# Patient Record
Sex: MALE | Race: BLACK OR AFRICAN AMERICAN | NOT HISPANIC OR LATINO | Employment: OTHER | URBAN - NONMETROPOLITAN AREA
[De-identification: names, ages, dates, MRNs, and addresses within clinical notes are randomized per-mention and may not be internally consistent; named-entity substitution may affect disease eponyms.]

---

## 2017-03-23 ENCOUNTER — HOSPITAL ENCOUNTER (INPATIENT)
Facility: HOSPITAL | Age: 52
LOS: 3 days | Discharge: HOME | End: 2017-03-26
Attending: FAMILY MEDICINE | Admitting: FAMILY MEDICINE

## 2017-03-23 PROBLEM — J96.90 RESPIRATORY FAILURE (HCC): Status: ACTIVE | Noted: 2017-03-23

## 2017-03-23 LAB
ARTERIAL PATENCY WRIST A: ABNORMAL
ATMOSPHERIC PRESS: ABNORMAL MMHG
BASE EXCESS BLDA CALC-SCNC: -1.8 MMOL/L (ref -2.4–2.4)
BDY SITE: ABNORMAL
CA-I BLD-MCNC: 4.5 MG/DL (ref 4.5–4.9)
CO2 BLDA-SCNC: 22.8 MMOL/L (ref 23–27)
GLUCOSE BLDA-MCNC: 369 MMOL/L
GLUCOSE BLDC GLUCOMTR-MCNC: NORMAL MG/DL (ref 70–130)
HCO3 BLDA-SCNC: 21.7 MMOL/L (ref 22–26)
HCT VFR BLD CALC: 39 % (ref 40–54)
HGB BLDA-MCNC: 13.2 G/DL (ref 14–18)
MODALITY: ABNORMAL
PCO2 BLDA: 33.2 MM HG (ref 35–45)
PH BLDA: 7.43 PH UNITS (ref 7.35–7.45)
PO2 BLDA: 74.1 MM HG (ref 80–105)
POTASSIUM BLDA-SCNC: 4.39 MMOL/L (ref 3.6–4.9)
SAO2 % BLDCOA: 94.4 %
SODIUM BLDA-SCNC: 134.8 MMOL/L (ref 138–146)

## 2017-03-23 PROCEDURE — 94799 UNLISTED PULMONARY SVC/PX: CPT

## 2017-03-23 PROCEDURE — 82962 GLUCOSE BLOOD TEST: CPT

## 2017-03-23 PROCEDURE — 25010000002 METHYLPREDNISOLONE PER 40 MG: Performed by: INTERNAL MEDICINE

## 2017-03-23 PROCEDURE — 82803 BLOOD GASES ANY COMBINATION: CPT | Performed by: INTERNAL MEDICINE

## 2017-03-23 PROCEDURE — 63710000001 INSULIN ASPART PER 5 UNITS: Performed by: INTERNAL MEDICINE

## 2017-03-23 PROCEDURE — 94640 AIRWAY INHALATION TREATMENT: CPT

## 2017-03-23 PROCEDURE — 94760 N-INVAS EAR/PLS OXIMETRY 1: CPT

## 2017-03-23 RX ORDER — NICOTINE POLACRILEX 4 MG
15 LOZENGE BUCCAL
Status: DISCONTINUED | OUTPATIENT
Start: 2017-03-23 | End: 2017-03-24 | Stop reason: SDUPTHER

## 2017-03-23 RX ORDER — CARVEDILOL PHOSPHATE 40 MG/1
40 CAPSULE, EXTENDED RELEASE ORAL DAILY
COMMUNITY
End: 2017-03-26 | Stop reason: HOSPADM

## 2017-03-23 RX ORDER — ONDANSETRON 4 MG/1
4 TABLET, FILM COATED ORAL EVERY 6 HOURS PRN
Status: DISCONTINUED | OUTPATIENT
Start: 2017-03-23 | End: 2017-03-26 | Stop reason: HOSPADM

## 2017-03-23 RX ORDER — CARVEDILOL 25 MG/1
25 TABLET ORAL 2 TIMES DAILY WITH MEALS
COMMUNITY
End: 2019-04-15 | Stop reason: ALTCHOICE

## 2017-03-23 RX ORDER — FUROSEMIDE 40 MG/1
40 TABLET ORAL 2 TIMES DAILY
COMMUNITY
End: 2018-11-21 | Stop reason: HOSPADM

## 2017-03-23 RX ORDER — SODIUM CHLORIDE 0.9 % (FLUSH) 0.9 %
1-10 SYRINGE (ML) INJECTION AS NEEDED
Status: DISCONTINUED | OUTPATIENT
Start: 2017-03-23 | End: 2017-03-26 | Stop reason: HOSPADM

## 2017-03-23 RX ORDER — HYDRALAZINE HYDROCHLORIDE 20 MG/ML
10 INJECTION INTRAMUSCULAR; INTRAVENOUS EVERY 6 HOURS PRN
Status: DISCONTINUED | OUTPATIENT
Start: 2017-03-23 | End: 2017-03-26 | Stop reason: HOSPADM

## 2017-03-23 RX ORDER — FUROSEMIDE 10 MG/ML
40 INJECTION INTRAMUSCULAR; INTRAVENOUS 2 TIMES DAILY
Status: DISCONTINUED | OUTPATIENT
Start: 2017-03-24 | End: 2017-03-26 | Stop reason: HOSPADM

## 2017-03-23 RX ORDER — BENAZEPRIL HYDROCHLORIDE 20 MG/1
40 TABLET ORAL DAILY
COMMUNITY
End: 2018-11-21 | Stop reason: HOSPADM

## 2017-03-23 RX ORDER — CARVEDILOL 25 MG/1
25 TABLET ORAL 2 TIMES DAILY WITH MEALS
Status: DISCONTINUED | OUTPATIENT
Start: 2017-03-23 | End: 2017-03-26 | Stop reason: HOSPADM

## 2017-03-23 RX ORDER — ACETAMINOPHEN 325 MG/1
650 TABLET ORAL EVERY 4 HOURS PRN
Status: DISCONTINUED | OUTPATIENT
Start: 2017-03-23 | End: 2017-03-26 | Stop reason: HOSPADM

## 2017-03-23 RX ORDER — ISOSORBIDE MONONITRATE 60 MG/1
60 TABLET, EXTENDED RELEASE ORAL
Status: DISCONTINUED | OUTPATIENT
Start: 2017-03-24 | End: 2017-03-26 | Stop reason: HOSPADM

## 2017-03-23 RX ORDER — DEXTROSE MONOHYDRATE 25 G/50ML
25 INJECTION, SOLUTION INTRAVENOUS
Status: DISCONTINUED | OUTPATIENT
Start: 2017-03-23 | End: 2017-03-26 | Stop reason: HOSPADM

## 2017-03-23 RX ORDER — HYDROCHLOROTHIAZIDE 25 MG/1
25 TABLET ORAL DAILY
COMMUNITY
End: 2017-03-26 | Stop reason: HOSPADM

## 2017-03-23 RX ORDER — AMLODIPINE BESYLATE 10 MG/1
10 TABLET ORAL DAILY
COMMUNITY
End: 2018-11-21 | Stop reason: HOSPADM

## 2017-03-23 RX ORDER — ALBUTEROL SULFATE 2.5 MG/3ML
2.5 SOLUTION RESPIRATORY (INHALATION) EVERY 4 HOURS PRN
Status: DISCONTINUED | OUTPATIENT
Start: 2017-03-23 | End: 2017-03-26 | Stop reason: HOSPADM

## 2017-03-23 RX ORDER — METHYLPREDNISOLONE SODIUM SUCCINATE 40 MG/ML
40 INJECTION, POWDER, LYOPHILIZED, FOR SOLUTION INTRAMUSCULAR; INTRAVENOUS EVERY 6 HOURS
Status: DISCONTINUED | OUTPATIENT
Start: 2017-03-23 | End: 2017-03-26 | Stop reason: HOSPADM

## 2017-03-23 RX ORDER — GLIPIZIDE 10 MG/1
10 TABLET ORAL
COMMUNITY
End: 2017-03-26 | Stop reason: HOSPADM

## 2017-03-23 RX ADMIN — CARVEDILOL 25 MG: 25 TABLET, FILM COATED ORAL at 22:15

## 2017-03-23 RX ADMIN — METHYLPREDNISOLONE SODIUM SUCCINATE 40 MG: 40 INJECTION, POWDER, FOR SOLUTION INTRAMUSCULAR; INTRAVENOUS at 22:15

## 2017-03-23 RX ADMIN — INSULIN ASPART 6 UNITS: 100 INJECTION, SOLUTION INTRAVENOUS; SUBCUTANEOUS at 22:15

## 2017-03-23 RX ADMIN — ACETAMINOPHEN 650 MG: 325 TABLET ORAL at 23:34

## 2017-03-23 RX ADMIN — ALBUTEROL SULFATE 2.5 MG: 2.5 SOLUTION RESPIRATORY (INHALATION) at 22:08

## 2017-03-23 RX ADMIN — Medication 10 ML: at 22:15

## 2017-03-24 ENCOUNTER — APPOINTMENT (OUTPATIENT)
Dept: CARDIOLOGY | Facility: HOSPITAL | Age: 52
End: 2017-03-24
Attending: INTERNAL MEDICINE

## 2017-03-24 LAB
ALBUMIN SERPL-MCNC: 3.2 G/DL (ref 3.4–4.8)
ALBUMIN/GLOB SERPL: 1.1 G/DL (ref 1.1–1.8)
ALP SERPL-CCNC: 58 U/L (ref 38–126)
ALT SERPL W P-5'-P-CCNC: 23 U/L (ref 21–72)
ANION GAP SERPL CALCULATED.3IONS-SCNC: 11 MMOL/L (ref 5–15)
ARTICHOKE IGE QN: 127 MG/DL (ref 1–129)
AST SERPL-CCNC: 61 U/L (ref 17–59)
BASOPHILS # BLD AUTO: 0.01 10*3/MM3 (ref 0–0.2)
BASOPHILS NFR BLD AUTO: 0.1 % (ref 0–2)
BH CV ECHO MEAS - ACS: 2.5 CM
BH CV ECHO MEAS - AO MAX PG (FULL): 1.6 MMHG
BH CV ECHO MEAS - AO MAX PG: 3.6 MMHG
BH CV ECHO MEAS - AO MEAN PG (FULL): 0.99 MMHG
BH CV ECHO MEAS - AO MEAN PG: 2 MMHG
BH CV ECHO MEAS - AO ROOT AREA: 11 CM^2
BH CV ECHO MEAS - AO ROOT DIAM: 3.7 CM
BH CV ECHO MEAS - AO V2 MAX: 95.3 CM/SEC
BH CV ECHO MEAS - AO V2 MEAN: 65.7 CM/SEC
BH CV ECHO MEAS - AO V2 VTI: 16.4 CM
BH CV ECHO MEAS - AVA(I,A): 3.1 CM^2
BH CV ECHO MEAS - AVA(I,D): 3.1 CM^2
BH CV ECHO MEAS - AVA(V,A): 3.4 CM^2
BH CV ECHO MEAS - AVA(V,D): 3.4 CM^2
BH CV ECHO MEAS - CONTRAST EF 4CH: 14.7 ML/M^2
BH CV ECHO MEAS - EDV(CUBED): 343.1 ML
BH CV ECHO MEAS - EDV(MOD-SP4): 224 ML
BH CV ECHO MEAS - EDV(TEICH): 255.5 ML
BH CV ECHO MEAS - EF(CUBED): 19.3 %
BH CV ECHO MEAS - EF(MOD-SP4): 14.7 %
BH CV ECHO MEAS - EF(TEICH): 15 %
BH CV ECHO MEAS - ESV(CUBED): 276.8 ML
BH CV ECHO MEAS - ESV(MOD-SP4): 191 ML
BH CV ECHO MEAS - ESV(TEICH): 217.3 ML
BH CV ECHO MEAS - FS: 6.9 %
BH CV ECHO MEAS - IVS/LVPW: 1.2
BH CV ECHO MEAS - IVSD: 1.3 CM
BH CV ECHO MEAS - LA DIMENSION: 5.4 CM
BH CV ECHO MEAS - LA/AO: 1.4
BH CV ECHO MEAS - LV MASS(C)D: 419.1 GRAMS
BH CV ECHO MEAS - LV MAX PG: 2 MMHG
BH CV ECHO MEAS - LV MEAN PG: 1 MMHG
BH CV ECHO MEAS - LV V1 MAX: 70.6 CM/SEC
BH CV ECHO MEAS - LV V1 MEAN: 44.9 CM/SEC
BH CV ECHO MEAS - LV V1 VTI: 11.1 CM
BH CV ECHO MEAS - LVIDD: 7 CM
BH CV ECHO MEAS - LVIDS: 6.5 CM
BH CV ECHO MEAS - LVLD AP4: 10.3 CM
BH CV ECHO MEAS - LVLS AP4: 9.9 CM
BH CV ECHO MEAS - LVOT AREA (M): 4.5 CM^2
BH CV ECHO MEAS - LVOT AREA: 4.6 CM^2
BH CV ECHO MEAS - LVOT DIAM: 2.4 CM
BH CV ECHO MEAS - LVPWD: 1.1 CM
BH CV ECHO MEAS - MR MAX PG: 63.8 MMHG
BH CV ECHO MEAS - MR MAX VEL: 399.3 CM/SEC
BH CV ECHO MEAS - MV A MAX VEL: 36.4 CM/SEC
BH CV ECHO MEAS - MV DEC SLOPE: 646.1 CM/SEC^2
BH CV ECHO MEAS - MV E MAX VEL: 87.5 CM/SEC
BH CV ECHO MEAS - MV E/A: 2.4
BH CV ECHO MEAS - MV MAX PG: 4.1 MMHG
BH CV ECHO MEAS - MV MEAN PG: 1.9 MMHG
BH CV ECHO MEAS - MV P1/2T MAX VEL: 104.4 CM/SEC
BH CV ECHO MEAS - MV P1/2T: 47.3 MSEC
BH CV ECHO MEAS - MV V2 MAX: 101.4 CM/SEC
BH CV ECHO MEAS - MV V2 MEAN: 63.8 CM/SEC
BH CV ECHO MEAS - MV V2 VTI: 21.3 CM
BH CV ECHO MEAS - MVA P1/2T LCG: 2.1 CM^2
BH CV ECHO MEAS - MVA(P1/2T): 4.6 CM^2
BH CV ECHO MEAS - MVA(VTI): 2.4 CM^2
BH CV ECHO MEAS - PA MAX PG: 1.4 MMHG
BH CV ECHO MEAS - PA V2 MAX: 59.7 CM/SEC
BH CV ECHO MEAS - RAP SYSTOLE: 20 MMHG
BH CV ECHO MEAS - RVDD: 3.3 CM
BH CV ECHO MEAS - RVSP: 52.1 MMHG
BH CV ECHO MEAS - SV(AO): 180.4 ML
BH CV ECHO MEAS - SV(CUBED): 66.4 ML
BH CV ECHO MEAS - SV(LVOT): 51.2 ML
BH CV ECHO MEAS - SV(MOD-SP4): 33 ML
BH CV ECHO MEAS - SV(TEICH): 38.2 ML
BH CV ECHO MEAS - TR MAX VEL: 280.4 CM/SEC
BILIRUB SERPL-MCNC: 0.5 MG/DL (ref 0.2–1.3)
BUN BLD-MCNC: 48 MG/DL (ref 7–21)
BUN/CREAT SERPL: 18.8 (ref 7–25)
CALCIUM SPEC-SCNC: 7.8 MG/DL (ref 8.4–10.2)
CHLORIDE SERPL-SCNC: 97 MMOL/L (ref 95–110)
CHOLEST SERPL-MCNC: 265 MG/DL (ref 0–199)
CK MB SERPL-CCNC: 3.16 NG/ML (ref 0–5)
CK SERPL-CCNC: 4335 U/L (ref 55–170)
CO2 SERPL-SCNC: 26 MMOL/L (ref 22–31)
CREAT BLD-MCNC: 2.55 MG/DL (ref 0.7–1.3)
D-DIMER, QUANTITATIVE (MAD,POW, STR): 712 NG/ML (FEU) (ref 0–470)
DEPRECATED RDW RBC AUTO: 43.1 FL (ref 35.1–43.9)
DEPRECATED RDW RBC AUTO: 43.7 FL (ref 35.1–43.9)
EOSINOPHIL # BLD AUTO: 0 10*3/MM3 (ref 0–0.7)
EOSINOPHIL NFR BLD AUTO: 0 % (ref 0–7)
ERYTHROCYTE [DISTWIDTH] IN BLOOD BY AUTOMATED COUNT: 13.5 % (ref 11.5–14.5)
ERYTHROCYTE [DISTWIDTH] IN BLOOD BY AUTOMATED COUNT: 13.6 % (ref 11.5–14.5)
GFR SERPL CREATININE-BSD FRML MDRD: 32 ML/MIN/1.73 (ref 56–130)
GLOBULIN UR ELPH-MCNC: 2.9 GM/DL (ref 2.3–3.5)
GLUCOSE BLD-MCNC: 527 MG/DL (ref 60–100)
GLUCOSE BLDC GLUCOMTR-MCNC: 354 MG/DL (ref 70–130)
GLUCOSE BLDC GLUCOMTR-MCNC: 386 MG/DL (ref 70–130)
GLUCOSE BLDC GLUCOMTR-MCNC: NORMAL MG/DL (ref 70–130)
HBA1C MFR BLD: 10.02 % (ref 4–5.6)
HCT VFR BLD AUTO: 37.3 % (ref 39–49)
HCT VFR BLD AUTO: 38.3 % (ref 39–49)
HDLC SERPL-MCNC: 46 MG/DL (ref 60–200)
HGB BLD-MCNC: 12.5 G/DL (ref 13.7–17.3)
HGB BLD-MCNC: 12.5 G/DL (ref 13.7–17.3)
IMM GRANULOCYTES # BLD: 0.02 10*3/MM3 (ref 0–0.02)
IMM GRANULOCYTES NFR BLD: 0.2 % (ref 0–0.5)
LDLC/HDLC SERPL: 4.2 {RATIO} (ref 0–3.55)
LYMPHOCYTES # BLD AUTO: 0.35 10*3/MM3 (ref 0.6–4.2)
LYMPHOCYTES NFR BLD AUTO: 4.4 % (ref 10–50)
MCH RBC QN AUTO: 28.5 PG (ref 26.5–34)
MCH RBC QN AUTO: 28.8 PG (ref 26.5–34)
MCHC RBC AUTO-ENTMCNC: 32.6 G/DL (ref 31.5–36.3)
MCHC RBC AUTO-ENTMCNC: 33.5 G/DL (ref 31.5–36.3)
MCV RBC AUTO: 85.9 FL (ref 80–98)
MCV RBC AUTO: 87.4 FL (ref 80–98)
MONOCYTES # BLD AUTO: 0.2 10*3/MM3 (ref 0–0.9)
MONOCYTES NFR BLD AUTO: 2.5 % (ref 0–12)
NEUTROPHILS # BLD AUTO: 7.44 10*3/MM3 (ref 2–8.6)
NEUTROPHILS NFR BLD AUTO: 92.8 % (ref 37–80)
NRBC BLD MANUAL-RTO: 0 /100 WBC (ref 0–0)
NT-PROBNP SERPL-MCNC: ABNORMAL PG/ML (ref 0–900)
PLATELET # BLD AUTO: 231 10*3/MM3 (ref 150–450)
PLATELET # BLD AUTO: 254 10*3/MM3 (ref 150–450)
PMV BLD AUTO: 10.3 FL (ref 8–12)
PMV BLD AUTO: 9.9 FL (ref 8–12)
POTASSIUM BLD-SCNC: 5.1 MMOL/L (ref 3.5–5.1)
PROT SERPL-MCNC: 6.1 G/DL (ref 6.3–8.6)
RBC # BLD AUTO: 4.34 10*6/MM3 (ref 4.37–5.74)
RBC # BLD AUTO: 4.38 10*6/MM3 (ref 4.37–5.74)
SODIUM BLD-SCNC: 134 MMOL/L (ref 137–145)
TRIGL SERPL-MCNC: 129 MG/DL (ref 20–199)
TROPONIN I SERPL-MCNC: 0.09 NG/ML
TROPONIN I SERPL-MCNC: 0.12 NG/ML
TROPONIN I SERPL-MCNC: 0.13 NG/ML
TSH SERPL DL<=0.05 MIU/L-ACNC: 1.28 MIU/ML (ref 0.46–4.68)
WBC NRBC COR # BLD: 6.95 10*3/MM3 (ref 3.2–9.8)
WBC NRBC COR # BLD: 8.02 10*3/MM3 (ref 3.2–9.8)

## 2017-03-24 PROCEDURE — 84443 ASSAY THYROID STIM HORMONE: CPT | Performed by: INTERNAL MEDICINE

## 2017-03-24 PROCEDURE — 63710000001 INSULIN ASPART PER 5 UNITS: Performed by: INTERNAL MEDICINE

## 2017-03-24 PROCEDURE — 80061 LIPID PANEL: CPT | Performed by: INTERNAL MEDICINE

## 2017-03-24 PROCEDURE — 93306 TTE W/DOPPLER COMPLETE: CPT

## 2017-03-24 PROCEDURE — 25010000002 METHYLPREDNISOLONE PER 40 MG: Performed by: INTERNAL MEDICINE

## 2017-03-24 PROCEDURE — 25010000002 ENOXAPARIN PER 10 MG: Performed by: INTERNAL MEDICINE

## 2017-03-24 PROCEDURE — 94799 UNLISTED PULMONARY SVC/PX: CPT

## 2017-03-24 PROCEDURE — 82553 CREATINE MB FRACTION: CPT | Performed by: INTERNAL MEDICINE

## 2017-03-24 PROCEDURE — 25010000002 FUROSEMIDE PER 20 MG: Performed by: INTERNAL MEDICINE

## 2017-03-24 PROCEDURE — 85379 FIBRIN DEGRADATION QUANT: CPT | Performed by: INTERNAL MEDICINE

## 2017-03-24 PROCEDURE — 94760 N-INVAS EAR/PLS OXIMETRY 1: CPT

## 2017-03-24 PROCEDURE — 87040 BLOOD CULTURE FOR BACTERIA: CPT | Performed by: INTERNAL MEDICINE

## 2017-03-24 PROCEDURE — 82550 ASSAY OF CK (CPK): CPT | Performed by: INTERNAL MEDICINE

## 2017-03-24 PROCEDURE — 84484 ASSAY OF TROPONIN QUANT: CPT | Performed by: INTERNAL MEDICINE

## 2017-03-24 PROCEDURE — 82962 GLUCOSE BLOOD TEST: CPT

## 2017-03-24 PROCEDURE — 25010000002 LEVOFLOXACIN PER 250 MG: Performed by: INTERNAL MEDICINE

## 2017-03-24 PROCEDURE — 83036 HEMOGLOBIN GLYCOSYLATED A1C: CPT | Performed by: INTERNAL MEDICINE

## 2017-03-24 PROCEDURE — 63710000001 INSULIN DETEMIR PER 5 UNITS: Performed by: INTERNAL MEDICINE

## 2017-03-24 PROCEDURE — 83880 ASSAY OF NATRIURETIC PEPTIDE: CPT | Performed by: INTERNAL MEDICINE

## 2017-03-24 PROCEDURE — 80053 COMPREHEN METABOLIC PANEL: CPT | Performed by: INTERNAL MEDICINE

## 2017-03-24 PROCEDURE — 85025 COMPLETE CBC W/AUTO DIFF WBC: CPT | Performed by: INTERNAL MEDICINE

## 2017-03-24 PROCEDURE — 85027 COMPLETE CBC AUTOMATED: CPT | Performed by: INTERNAL MEDICINE

## 2017-03-24 RX ORDER — IPRATROPIUM BROMIDE AND ALBUTEROL SULFATE 2.5; .5 MG/3ML; MG/3ML
3 SOLUTION RESPIRATORY (INHALATION)
Status: DISCONTINUED | OUTPATIENT
Start: 2017-03-24 | End: 2017-03-26 | Stop reason: HOSPADM

## 2017-03-24 RX ORDER — LEVOFLOXACIN 5 MG/ML
250 INJECTION, SOLUTION INTRAVENOUS EVERY 24 HOURS
Status: DISCONTINUED | OUTPATIENT
Start: 2017-03-24 | End: 2017-03-26

## 2017-03-24 RX ORDER — NICOTINE POLACRILEX 4 MG
15 LOZENGE BUCCAL
Status: DISCONTINUED | OUTPATIENT
Start: 2017-03-24 | End: 2017-03-26 | Stop reason: HOSPADM

## 2017-03-24 RX ORDER — DEXTROSE MONOHYDRATE 25 G/50ML
25 INJECTION, SOLUTION INTRAVENOUS
Status: DISCONTINUED | OUTPATIENT
Start: 2017-03-24 | End: 2017-03-24 | Stop reason: SDUPTHER

## 2017-03-24 RX ADMIN — Medication 10 ML: at 16:41

## 2017-03-24 RX ADMIN — METHYLPREDNISOLONE SODIUM SUCCINATE 40 MG: 40 INJECTION, POWDER, FOR SOLUTION INTRAMUSCULAR; INTRAVENOUS at 10:36

## 2017-03-24 RX ADMIN — METHYLPREDNISOLONE SODIUM SUCCINATE 40 MG: 40 INJECTION, POWDER, FOR SOLUTION INTRAMUSCULAR; INTRAVENOUS at 20:40

## 2017-03-24 RX ADMIN — INSULIN ASPART 6 UNITS: 100 INJECTION, SOLUTION INTRAVENOUS; SUBCUTANEOUS at 05:43

## 2017-03-24 RX ADMIN — ISOSORBIDE MONONITRATE 60 MG: 60 TABLET, EXTENDED RELEASE ORAL at 10:35

## 2017-03-24 RX ADMIN — INSULIN ASPART 7 UNITS: 100 INJECTION, SOLUTION INTRAVENOUS; SUBCUTANEOUS at 20:45

## 2017-03-24 RX ADMIN — IPRATROPIUM BROMIDE AND ALBUTEROL SULFATE 3 ML: 2.5; .5 SOLUTION RESPIRATORY (INHALATION) at 19:35

## 2017-03-24 RX ADMIN — INSULIN ASPART 7 UNITS: 100 INJECTION, SOLUTION INTRAVENOUS; SUBCUTANEOUS at 12:06

## 2017-03-24 RX ADMIN — METHYLPREDNISOLONE SODIUM SUCCINATE 40 MG: 40 INJECTION, POWDER, FOR SOLUTION INTRAMUSCULAR; INTRAVENOUS at 03:11

## 2017-03-24 RX ADMIN — Medication 10 ML: at 20:46

## 2017-03-24 RX ADMIN — Medication 10 ML: at 08:40

## 2017-03-24 RX ADMIN — LEVOFLOXACIN 250 MG: 5 INJECTION, SOLUTION INTRAVENOUS at 16:41

## 2017-03-24 RX ADMIN — INSULIN ASPART 7 UNITS: 100 INJECTION, SOLUTION INTRAVENOUS; SUBCUTANEOUS at 16:57

## 2017-03-24 RX ADMIN — INSULIN DETEMIR 10 UNITS: 100 INJECTION, SOLUTION SUBCUTANEOUS at 21:35

## 2017-03-24 RX ADMIN — CARVEDILOL 25 MG: 25 TABLET, FILM COATED ORAL at 10:35

## 2017-03-24 RX ADMIN — FUROSEMIDE 40 MG: 10 INJECTION, SOLUTION INTRAMUSCULAR; INTRAVENOUS at 17:44

## 2017-03-24 RX ADMIN — ENOXAPARIN SODIUM 30 MG: 30 INJECTION SUBCUTANEOUS at 10:36

## 2017-03-24 RX ADMIN — FUROSEMIDE 40 MG: 10 INJECTION, SOLUTION INTRAMUSCULAR; INTRAVENOUS at 08:40

## 2017-03-24 RX ADMIN — CARVEDILOL 25 MG: 25 TABLET, FILM COATED ORAL at 17:44

## 2017-03-24 RX ADMIN — METHYLPREDNISOLONE SODIUM SUCCINATE 40 MG: 40 INJECTION, POWDER, FOR SOLUTION INTRAMUSCULAR; INTRAVENOUS at 16:40

## 2017-03-24 NOTE — CONSULTS
"Adult Nutrition  Assessment    Patient Name:  Jamil Olivo  YOB: 1965  MRN: [id number]  Admit Date:  3/23/2017    Assessment Date:  3/24/2017          Reason for Assessment       03/24/17 1623    Reason for Assessment    Reason For Assessment/Visit education                Anthropometrics       03/24/17 1623    Anthropometrics    Height 180.3 cm (70.98\")    Weight 109 kg (240 lb 4.8 oz)    Ideal Body Weight (IBW)    Ideal Body Weight (IBW), Male (kg) 79.23    % Ideal Body Weight 137.86    Body Mass Index (BMI)    BMI (kg/m2) 33.6    BMI Grade 30 - 34.9- obesity - grade I            Labs/Tests/Procedures/Meds       03/24/17 1624    Labs/Tests/Procedures/Meds    Labs/Tests Review Hgb Hct; blood sugar, AlbOther (comment)   Chol, HDL              Estimated/Assessed Needs       03/24/17 1625    Calculation Measurements    Weight Used For Calculations 85.9 kg (189 lb 6 oz)    Height Used for Calculations 1.803 m (5' 10.98\")    Estimated/Assessed Energy Needs    Energy Need Method [place]-[employer] Jeor    Age 51    RMR ([place]akeside-St. Jeor Equation) 1730.87    Activity Factors ([place] St Jeor)  Out of bed, ambulatory  1.3    Total estimated needs ([place] St. Jeor) 2249    Estimated/Assessed Protein Needs    Weight Used for Protein Calculation 85.9 kg (189 lb 6 oz)    Estimated Protein Range 85 - 103    Estimated/Assessed Fluid Needs    Fluid Need Method --   2577            Nutrition Prescription Ordered       03/24/17 1628    Nutrition Prescription PO    Current PO Diet Regular    Common Modifiers Cardiac;Consistent Carbohydrate;Renal            Evaluation of Received Nutrient/Fluid Intake       03/24/17 1629    PO Evaluation    Number of Meals 2    % PO Intake 100% - 1x, 75% - 1x            Comments:  Pt reports good appetite.  Voiced no food preferences.  Intake 75% - 1x, 100% - 1x.  Blood Glucose is elevated.  Sliding scale novolog prescribed.  Mild Sodium Restricted Diet info and Basic ADA diet info used to " provide ADA Low Sodium diet ed and diet copies given.        Electronically signed by:  Haley García RD  03/24/17 4:39 PM

## 2017-03-24 NOTE — PLAN OF CARE
Problem: Patient Care Overview (Adult)  Goal: Plan of Care Review  Outcome: Ongoing (interventions implemented as appropriate)    03/24/17 0516   Coping/Psychosocial Response Interventions   Plan Of Care Reviewed With patient   Patient Care Overview   Progress improving   Outcome Evaluation   Outcome Summary/Follow up Plan now on 3l/nc with sats mid-upper 90's, resp even and non-labored       Goal: Adult Individualization and Mutuality  Outcome: Ongoing (interventions implemented as appropriate)    03/24/17 0516   Individualization   Patient Specific Preferences prefers wife to bathe him       Goal: Discharge Needs Assessment  Outcome: Ongoing (interventions implemented as appropriate)    03/24/17 0516   Discharge Needs Assessment   Concerns To Be Addressed adjustment to diagnosis/illness concerns         Problem: Cardiac: Heart Failure (Adult)  Goal: Signs and Symptoms of Listed Potential Problems Will be Absent or Manageable (Cardiac: Heart Failure)  Outcome: Ongoing (interventions implemented as appropriate)    03/24/17 0516   Cardiac: Heart Failure   Problems Assessed (Heart Failure) all   Problems Present (Heart Failure) none         Problem: Diabetes, Type 2 (Adult)  Goal: Signs and Symptoms of Listed Potential Problems Will be Absent or Manageable (Diabetes, Type 2)  Outcome: Ongoing (interventions implemented as appropriate)    03/24/17 0516   Diabetes, Type 2   Problems Assessed (Type 2 Diabetes) all   Problems Present (Type 2 Diabetes) impaired glycemic control

## 2017-03-24 NOTE — PROGRESS NOTES
[facility] Medicine Services  INPATIENT PROGRESS NOTE    Length of Stay: 1  Date of Admission: 3/23/2017  Primary Care Physician: John Hyde MD    Subjective   Subjective:  Feels much better, denies shortness of breath with chest pain no abdominal pain.    Review of Systems     All pertinent negatives and positives are as above. All other systems have been reviewed and are negative unless otherwise stated.     Objective    Temp:  [96.8 °F (36 °C)-99.4 °F (37.4 °C)] 96.8 °F (36 °C)  Heart Rate:  [] 76  Resp:  [18-32] 18  BP: (114-173)/() 133/87  Physical Exam    Patient appears well, alert and oriented x 3, pleasant, cooperative.   Neck supple  No JVD No thyromegaly.  Lungs + crackles no wheezing   CV S1S2 normal, no murmurs, clicks, gallops or rubs.  Abdomen is soft, no tenderness, masses or organomegaly.    Extremities: no clubbing cyanosis or edema   Neurological CN 2-12 intact   .    No current facility-administered medications on file prior to encounter.      No current outpatient prescriptions on file prior to encounter.         Results Review:  I have reviewed the labs, radiology results, and diagnostic studies.    Laboratory Data:  [unfilled]  Lab Results (last 24 hours)     Procedure Component Value Units Date/Time    POC Glucose Fingerstick [[id number]] Collected:  03/23/17 2159    Specimen:  Blood Updated:  03/23/17 2200     Glucose -- mg/dL     Blood Gas, Arterial [[id number]]  (Abnormal) Collected:  03/23/17 2203    Specimen:  Arterial Blood Updated:  03/23/17 2209     Site --      Not performed at this site.        Hilario's Test --      Not performed at this site.        pH, Arterial 7.434 pH units      pCO2, Arterial 33.2 (L) mm Hg      pO2, Arterial 74.1 (L) mm Hg      HCO3, Arterial 21.7 (L) mmol/L      Base Excess, Arterial -1.8 mmol/L      O2 Saturation, Arterial 94.4 %      Hemoglobin, Blood Gas 13.2 (L) g/dL      Hematocrit, Blood Gas 39.0 (L)  %      CO2 Content 22.8 (L)     Sodium, Arterial 134.8 (L) mmol/L      Potassium, Arterial 4.39 mmol/L      Glucose, Arterial 369 mmol/L      Barometric Pressure for Blood Gas -- mmHg       Not performed at this site.        Modality --      Not performed at this site.        Ionized Calcium 4.5 mg/dL     POC Glucose Fingerstick [[id number]]  (Abnormal) Collected:  03/23/17 2158    Specimen:  Blood Updated:  03/24/17 0050     Glucose 354 (H) mg/dL       Sliding Scale AdminMeter: [id number]: [id number] [address]       POC Glucose Fingerstick [[id number]] Collected:  03/24/17 0542    Specimen:  Blood Updated:  03/24/17 0543     Glucose -- mg/dL     POC Glucose Fingerstick [[id number]]  (Abnormal) Collected:  03/24/17 0540    Specimen:  Blood Updated:  03/24/17 0618     Glucose 386 (H) mg/dL       Sliding Scale AdminMeter: [id number]: [id number] [address]       CBC (No Diff) [[id number]]  (Abnormal) Collected:  03/24/17 0600    Specimen:  Blood Updated:  03/24/17 0647     WBC 6.95 10*3/mm3      RBC 4.34 (L) 10*6/mm3      Hemoglobin 12.5 (L) g/dL      Hematocrit 37.3 (L) %      MCV 85.9 fL      MCH 28.8 pg      MCHC 33.5 g/dL      RDW 13.5 %      RDW-SD 43.1 fl      MPV 9.9 fL      Platelets 254 10*3/mm3     Hemoglobin A1c [[id number]]  (Abnormal) Collected:  03/24/17 0600    Specimen:  Blood Updated:  03/24/17 0707     Hemoglobin A1C 10.02 (H) %     Lipid Panel [[id number]]  (Abnormal) Collected:  03/24/17 0600    Specimen:  Blood Updated:  03/24/17 0736     Total Cholesterol 265 (H) mg/dL      Triglycerides 129 mg/dL      HDL Cholesterol 46 (L) mg/dL      LDL Cholesterol  127 mg/dL      LDL/HDL Ratio 4.20 (H)    BNP [[id number]]  (Abnormal) Collected:  03/24/17 0600    Specimen:  Blood Updated:  03/24/17 0754     proBNP 61787.0 (H) pg/mL     TSH [[id number]]  (Normal) Collected:  03/24/17 0600    Specimen:  Blood Updated:  03/24/17 0754     TSH 1.280 mIU/mL     CBC & Differential [[id number]]  Collected:  03/24/17 1102    Specimen:  Blood Updated:  03/24/17 1113    Narrative:       The following orders were created for panel order CBC & Differential.  Procedure                               Abnormality         Status                     ---------                               -----------         ------                     CBC Auto Differential[[id number]]         Abnormal            Final result                 Please view results for these tests on the individual orders.    CBC Auto Differential [[id number]]  (Abnormal) Collected:  03/24/17 1102    Specimen:  Blood Updated:  03/24/17 1113     WBC 8.02 10*3/mm3      RBC 4.38 10*6/mm3      Hemoglobin 12.5 (L) g/dL      Hematocrit 38.3 (L) %      MCV 87.4 fL      MCH 28.5 pg      MCHC 32.6 g/dL      RDW 13.6 %      RDW-SD 43.7 fl      MPV 10.3 fL      Platelets 231 10*3/mm3      Neutrophil % 92.8 (H) %      Lymphocyte % 4.4 (L) %      Monocyte % 2.5 %      Eosinophil % 0.0 %      Basophil % 0.1 %      Immature Grans % 0.2 %      Neutrophils, Absolute 7.44 10*3/mm3      Lymphocytes, Absolute 0.35 (L) 10*3/mm3      Monocytes, Absolute 0.20 10*3/mm3      Eosinophils, Absolute 0.00 10*3/mm3      Basophils, Absolute 0.01 10*3/mm3      Immature Grans, Absolute 0.02 10*3/mm3      nRBC 0.0 /100 WBC     SCANNED - LABS [[id number]] Resulted:  03/23/17      Updated:  03/24/17 1208    Comprehensive Metabolic Panel [[id number]]  (Abnormal) Collected:  03/24/17 1151    Specimen:  Blood Updated:  03/24/17 1231     Glucose 527 (C) mg/dL      BUN 48 (H) mg/dL      Creatinine 2.55 (H) mg/dL      Sodium 134 (L) mmol/L      Potassium 5.1 mmol/L      Chloride 97 mmol/L      CO2 26.0 mmol/L      Calcium 7.8 (L) mg/dL      Total Protein 6.1 (L) g/dL      Albumin 3.20 (L) g/dL      ALT (SGPT) 23 U/L      AST (SGOT) 61 (H) U/L      Alkaline Phosphatase 58 U/L      Total Bilirubin 0.5 mg/dL      eGFR   Amer 32 (L) mL/min/1.73      Globulin 2.9 gm/dL      A/G Ratio 1.1 g/dL       BUN/Creatinine Ratio 18.8     Anion Gap 11.0 mmol/L     CK [[id number]]  (Abnormal) Collected:  03/24/17 1151    Specimen:  Blood Updated:  03/24/17 1232     Creatine Kinase 4335 (H) U/L     Troponin [[id number]]  (Abnormal) Collected:  03/24/17 1151    Specimen:  Blood Updated:  03/24/17 1244     Troponin I 0.129 (C) ng/mL     CK-MB [[id number]]  (Normal) Collected:  03/24/17 1105    Specimen:  Blood Updated:  03/24/17 1313     CKMB 3.16 ng/mL           Culture Data:   No results found for: BLOODCX  No results found for: URINECX  No results found for: RESPCX  No results found for: WOUNDCX  No results found for: STOOLCX  No components found for: BODYFLD    Radiology Data:   Imaging Results (last 24 hours)     ** No results found for the last 24 hours. **          I have reviewed the patient current medications.     Assessment/Plan         1.Acute hypoxic respiratory failure: continue oxygen  Multifactorial chf and acute bronchitis   DM type 2  HTN  PREMA vs CKD   Indeterminate troponins            Assessment/Plan:  1.  Acute hypoxic respiratory failure: Continue IV Lasix, oxygen and bronchodilators, add antibiotics Levaquin, obtain a d-dimer, continue to check cardiac enzymes.  Continue steroids.  2.  Mild elevation in troponin: Continue to trend cardiac enzymes.  Cardiology consulted.  3.  Acute bronchitis: Start Levaquin and obtain sputum and blood cultures.  4.  Acute kidney injury: Creatinine 2.5: Continue IV Lasix monitor urine output and serum creatinine.  5.  Hypertension essential: Continue current medications and monitor blood pressure.  6.  Diabetes type II with diabetic nephropathy: Continue insulin sliding scale.  DVT prophylaxis      Bernadette Flaherty MD   03/24/17   3:16 PM

## 2017-03-24 NOTE — PLAN OF CARE
Problem: Patient Care Overview (Adult)  Goal: Plan of Care Review  Outcome: Ongoing (interventions implemented as appropriate)  Mild Sodium Diet Restriction and Basic ADA Diet info used to provide instruction regarding Low Sodium ADA diet and diet copy given.    03/24/17 4017   Coping/Psychosocial Response Interventions   Plan Of Care Reviewed With patient;significant other   Patient Care Overview   Progress no change   Outcome Evaluation   Outcome Summary/Follow up Plan initial assessment

## 2017-03-24 NOTE — H&P
[facility] Medicine Admission      Date of Admission: 3/23/2017      Primary Care Physician: John Hyde MD      Chief Complaint: sob    HPI:51 y old AA male who was in his usual state of health until yesterday when he started becoming very short of breath  and was wheezing ,he states that he has a cold recently he denies fever or chills denies orthopnea but his legs were getting swollen . Patient was on lasix and did not take a dose .  He denies chest pain   He went t [facility] where he was found in acute hypoxic respiratory failure with a high BNP he was given 80 mg of lasix  Put on a Bipap  Given some nitroglycerin   His breathing improved       Past Medical History: DM type 2, HTN ,    Past Surgical History: no surgeries     Family History: positive for DM ,HTN and CHF    Social History:  Does not smoke .does not  Drink alcohol No illicit drug use   Lives with his wife     Allergies: Motrin   Medications: Scheduled Meds:  [START ON 3/24/2017] enoxaparin 30 mg Subcutaneous Daily   [START ON 3/24/2017] furosemide 40 mg Intravenous BID   insulin aspart 0-7 Units Subcutaneous 4x Daily AC & at Bedtime   methylPREDNISolone sodium succinate 40 mg Intravenous Q6H     Continuous Infusions:   PRN Meds:.•  albuterol  •  dextrose  •  dextrose  •  glucagon (human recombinant)  •  ondansetron  •  sodium chloride    Review of Systems:  Review of Systems   Constitutional: Negative for activity change, appetite change, chills, diaphoresis, fatigue, fever and unexpected weight change.   HENT: Negative.  Negative for congestion, dental problem, drooling, ear discharge, ear pain, facial swelling, hearing loss, mouth sores, nosebleeds, postnasal drip, rhinorrhea, sinus pressure, sneezing, tinnitus, trouble swallowing and voice change.    Eyes: Negative for photophobia and discharge.   Respiratory: Positive for cough, shortness of breath and wheezing. Negative for apnea,  choking and stridor.    Cardiovascular: Negative for chest pain, palpitations and leg swelling.   Gastrointestinal: Negative for abdominal pain, anal bleeding, blood in stool, constipation, diarrhea, nausea, rectal pain and vomiting.   Endocrine: Negative for cold intolerance, heat intolerance, polydipsia, polyphagia and polyuria.   Genitourinary: Negative for dysuria, enuresis, frequency, hematuria and urgency.   Musculoskeletal: Negative for arthralgias, back pain, joint swelling, myalgias, neck pain and neck stiffness.   Skin: Negative for color change, pallor, rash and wound.   Allergic/Immunologic: Negative for food allergies and immunocompromised state.   Neurological: Negative for dizziness, tremors, seizures, syncope, facial asymmetry, speech difficulty, weakness, light-headedness, numbness and headaches.   Hematological: Negative for adenopathy.   Psychiatric/Behavioral: Negative for agitation, behavioral problems, confusion, hallucinations, sleep disturbance and suicidal ideas. The patient is not nervous/anxious.       Otherwise complete ROS is negative except as mentioned above.    Physical Exam:   Temp:  [99.2 °F (37.3 °C)] 99.2 °F (37.3 °C)  Heart Rate:  [100-101] 100  Resp:  [32] 32  BP: (173)/(106) 173/106  Physical Exam   Constitutional: He is oriented to person, place, and time. He appears well-developed and well-nourished.  Non-toxic appearance. He does not have a sickly appearance. He does not appear ill. No distress. He is not intubated.   HENT:   Head: Normocephalic and atraumatic.   Right Ear: External ear normal.   Left Ear: External ear normal.   Nose: Nose normal.   Mouth/Throat: Oropharynx is clear and moist. No oropharyngeal exudate.   Eyes: Conjunctivae and EOM are normal. Pupils are equal, round, and reactive to light. Right eye exhibits no discharge and no exudate. Left eye exhibits no discharge and no exudate. Right conjunctiva is not injected. Right conjunctiva has no hemorrhage. Left  conjunctiva is not injected. Left conjunctiva has no hemorrhage. No scleral icterus.   Neck: Normal range of motion. Neck supple. No hepatojugular reflux and no JVD present. No tracheal tenderness, no spinous process tenderness and no muscular tenderness present. Carotid bruit is not present. No rigidity. No tracheal deviation, no edema, no erythema and normal range of motion present. No thyroid mass and no thyromegaly present.   Cardiovascular: Normal rate, regular rhythm, normal heart sounds and intact distal pulses.   No extrasystoles are present. Exam reveals no gallop and no friction rub.    No murmur heard.  Pulmonary/Chest: Effort normal. No stridor. He is not intubated. No respiratory distress. He has decreased breath sounds. He has no wheezes. He has no rhonchi. He has no rales. He exhibits no tenderness.   Abdominal: Soft. Normal appearance and bowel sounds are normal. He exhibits no shifting dullness, no distension, no pulsatile liver, no fluid wave, no abdominal bruit, no ascites, no pulsatile midline mass and no mass. There is no hepatosplenomegaly, splenomegaly or hepatomegaly. There is no tenderness. There is no rigidity, no rebound, no guarding, no CVA tenderness, no tenderness at McBurney's point and negative Lauren's sign. No hernia.   Genitourinary: Rectal exam shows guaiac negative stool.   Musculoskeletal: Normal range of motion. He exhibits no edema, tenderness or deformity.        Right shoulder: He exhibits no swelling.       Vascular Status -  His exam exhibits right foot edema. His exam exhibits left foot edema.   Skin Integrity  -   He hasno right foot ulcer, non-callous right foot, no right foot warmth and no right foot blister. He has no left foot ulcer, non-callous left foot, no left foot warmth and no left foot blister..  Lymphadenopathy:     He has no cervical adenopathy.     He has no axillary adenopathy.   Neurological: He is alert and oriented to person, place, and time. He has  normal strength and normal reflexes. He is not disoriented. He displays no atrophy, no tremor and normal reflexes. No cranial nerve deficit or sensory deficit. He exhibits normal muscle tone. He displays no seizure activity. Coordination and gait normal. He displays no Babinski's sign on the right side. He displays no Babinski's sign on the left side.   Skin: Skin is warm and dry. No abrasion, no bruising, no burn, no ecchymosis, no laceration, no lesion, no purpura and no rash noted. Rash is not macular, not papular, not maculopapular, not nodular, not pustular, not vesicular and not urticarial. He is not diaphoretic. No cyanosis or erythema. No pallor. Nails show no clubbing.   Psychiatric: Judgment and thought content normal. His mood appears not anxious. His affect is not angry, not blunt, not labile and not inappropriate. His speech is not slurred. He is not agitated, not aggressive, not hyperactive, not slowed, not withdrawn and not combative. Thought content is not paranoid and not delusional. Cognition and memory are not impaired. He does not express impulsivity or inappropriate judgment. He does not exhibit a depressed mood. He expresses no homicidal and no suicidal ideation. He expresses no suicidal plans and no homicidal plans. He is communicative. He exhibits normal recent memory and normal remote memory.         Results Reviewed:  I have personally reviewed current lab, radiology, and data and agree with results.  Lab Results (last 24 hours)     ** No results found for the last 24 hours. **        Imaging Results (last 24 hours)     ** No results found for the last 24 hours. **            Assessment:    1.Acute hypoxic respiratory failure  Multifactorial  chf and acute bronchitis   DM type 2  HTN  PREMA vs CKD   Indeterminate troponins         Plan:  Admit to step down on telemetry   Iv lasix  I&O   Daily weight   Cardiac and diabetic diet   Will hold metformin as creat is high  Insulin sliding scale  checkhb A1c  2d echocardiogram   betablocker   Daily weights   Nitrites  Nebs  Steroids   abg   Oxygen to keep sat above 90%  Cardiology consult   DVT prophylaxis lovenox     I discussed the patients findings and my recommendations with: patient     Robert Zayas MD  03/23/17  8:51 PM

## 2017-03-25 PROBLEM — N18.4 CKD (CHRONIC KIDNEY DISEASE) STAGE 4, GFR 15-29 ML/MIN: Chronic | Status: ACTIVE | Noted: 2017-03-25

## 2017-03-25 PROBLEM — I50.20 SYSTOLIC CHF: Chronic | Status: ACTIVE | Noted: 2017-03-25

## 2017-03-25 LAB
ALBUMIN SERPL-MCNC: 3.2 G/DL (ref 3.4–4.8)
ALBUMIN/GLOB SERPL: 1.1 G/DL (ref 1.1–1.8)
ALP SERPL-CCNC: 66 U/L (ref 38–126)
ALT SERPL W P-5'-P-CCNC: 24 U/L (ref 21–72)
ANION GAP SERPL CALCULATED.3IONS-SCNC: 11 MMOL/L (ref 5–15)
AST SERPL-CCNC: 56 U/L (ref 17–59)
BASOPHILS # BLD AUTO: 0 10*3/MM3 (ref 0–0.2)
BASOPHILS NFR BLD AUTO: 0 % (ref 0–2)
BILIRUB SERPL-MCNC: 0.5 MG/DL (ref 0.2–1.3)
BUN BLD-MCNC: 64 MG/DL (ref 7–21)
BUN/CREAT SERPL: 24.2 (ref 7–25)
CALCIUM SPEC-SCNC: 7.8 MG/DL (ref 8.4–10.2)
CHLORIDE SERPL-SCNC: 100 MMOL/L (ref 95–110)
CO2 SERPL-SCNC: 24 MMOL/L (ref 22–31)
CREAT BLD-MCNC: 2.65 MG/DL (ref 0.7–1.3)
DEPRECATED RDW RBC AUTO: 40.8 FL (ref 35.1–43.9)
EOSINOPHIL # BLD AUTO: 0 10*3/MM3 (ref 0–0.7)
EOSINOPHIL NFR BLD AUTO: 0 % (ref 0–7)
ERYTHROCYTE [DISTWIDTH] IN BLOOD BY AUTOMATED COUNT: 13.3 % (ref 11.5–14.5)
GFR SERPL CREATININE-BSD FRML MDRD: 31 ML/MIN/1.73 (ref 56–130)
GLOBULIN UR ELPH-MCNC: 2.9 GM/DL (ref 2.3–3.5)
GLUCOSE BLD-MCNC: 399 MG/DL (ref 60–100)
GLUCOSE BLDC GLUCOMTR-MCNC: 386 MG/DL (ref 70–130)
GLUCOSE BLDC GLUCOMTR-MCNC: 410 MG/DL (ref 70–130)
GLUCOSE BLDC GLUCOMTR-MCNC: 521 MG/DL (ref 70–130)
GLUCOSE BLDC GLUCOMTR-MCNC: 564 MG/DL (ref 70–130)
GLUCOSE BLDC GLUCOMTR-MCNC: 565 MG/DL (ref 70–130)
HCT VFR BLD AUTO: 33.3 % (ref 39–49)
HGB BLD-MCNC: 11.3 G/DL (ref 13.7–17.3)
IMM GRANULOCYTES # BLD: 0.02 10*3/MM3 (ref 0–0.02)
IMM GRANULOCYTES NFR BLD: 0.2 % (ref 0–0.5)
LYMPHOCYTES # BLD AUTO: 0.35 10*3/MM3 (ref 0.6–4.2)
LYMPHOCYTES NFR BLD AUTO: 4.3 % (ref 10–50)
MCH RBC QN AUTO: 28.6 PG (ref 26.5–34)
MCHC RBC AUTO-ENTMCNC: 33.9 G/DL (ref 31.5–36.3)
MCV RBC AUTO: 84.3 FL (ref 80–98)
MONOCYTES # BLD AUTO: 0.3 10*3/MM3 (ref 0–0.9)
MONOCYTES NFR BLD AUTO: 3.6 % (ref 0–12)
NEUTROPHILS # BLD AUTO: 7.55 10*3/MM3 (ref 2–8.6)
NEUTROPHILS NFR BLD AUTO: 91.9 % (ref 37–80)
PLATELET # BLD AUTO: 265 10*3/MM3 (ref 150–450)
PMV BLD AUTO: 9.6 FL (ref 8–12)
POTASSIUM BLD-SCNC: 4.9 MMOL/L (ref 3.5–5.1)
PROT SERPL-MCNC: 6.1 G/DL (ref 6.3–8.6)
RBC # BLD AUTO: 3.95 10*6/MM3 (ref 4.37–5.74)
SODIUM BLD-SCNC: 135 MMOL/L (ref 137–145)
WBC NRBC COR # BLD: 8.22 10*3/MM3 (ref 3.2–9.8)

## 2017-03-25 PROCEDURE — 82962 GLUCOSE BLOOD TEST: CPT

## 2017-03-25 PROCEDURE — 25010000002 LEVOFLOXACIN PER 250 MG: Performed by: INTERNAL MEDICINE

## 2017-03-25 PROCEDURE — 85025 COMPLETE CBC W/AUTO DIFF WBC: CPT | Performed by: INTERNAL MEDICINE

## 2017-03-25 PROCEDURE — 94760 N-INVAS EAR/PLS OXIMETRY 1: CPT

## 2017-03-25 PROCEDURE — 25010000002 METHYLPREDNISOLONE PER 40 MG: Performed by: INTERNAL MEDICINE

## 2017-03-25 PROCEDURE — 94799 UNLISTED PULMONARY SVC/PX: CPT

## 2017-03-25 PROCEDURE — 25010000002 HYDRALAZINE PER 20 MG: Performed by: INTERNAL MEDICINE

## 2017-03-25 PROCEDURE — 80053 COMPREHEN METABOLIC PANEL: CPT | Performed by: INTERNAL MEDICINE

## 2017-03-25 PROCEDURE — 63710000001 INSULIN DETEMIR PER 5 UNITS: Performed by: INTERNAL MEDICINE

## 2017-03-25 PROCEDURE — 63710000001 INSULIN ASPART PER 5 UNITS: Performed by: INTERNAL MEDICINE

## 2017-03-25 PROCEDURE — 25010000002 ENOXAPARIN PER 10 MG: Performed by: INTERNAL MEDICINE

## 2017-03-25 PROCEDURE — 25010000002 FUROSEMIDE PER 20 MG: Performed by: INTERNAL MEDICINE

## 2017-03-25 RX ADMIN — INSULIN ASPART 14 UNITS: 100 INJECTION, SOLUTION INTRAVENOUS; SUBCUTANEOUS at 20:55

## 2017-03-25 RX ADMIN — FUROSEMIDE 40 MG: 10 INJECTION, SOLUTION INTRAMUSCULAR; INTRAVENOUS at 17:27

## 2017-03-25 RX ADMIN — INSULIN ASPART 14 UNITS: 100 INJECTION, SOLUTION INTRAVENOUS; SUBCUTANEOUS at 06:24

## 2017-03-25 RX ADMIN — IPRATROPIUM BROMIDE AND ALBUTEROL SULFATE 3 ML: 2.5; .5 SOLUTION RESPIRATORY (INHALATION) at 19:29

## 2017-03-25 RX ADMIN — INSULIN ASPART 14 UNITS: 100 INJECTION, SOLUTION INTRAVENOUS; SUBCUTANEOUS at 11:06

## 2017-03-25 RX ADMIN — INSULIN ASPART 12 UNITS: 100 INJECTION, SOLUTION INTRAVENOUS; SUBCUTANEOUS at 17:27

## 2017-03-25 RX ADMIN — ISOSORBIDE MONONITRATE 60 MG: 60 TABLET, EXTENDED RELEASE ORAL at 08:43

## 2017-03-25 RX ADMIN — IPRATROPIUM BROMIDE AND ALBUTEROL SULFATE 3 ML: 2.5; .5 SOLUTION RESPIRATORY (INHALATION) at 06:40

## 2017-03-25 RX ADMIN — LEVOFLOXACIN 250 MG: 5 INJECTION, SOLUTION INTRAVENOUS at 15:12

## 2017-03-25 RX ADMIN — METHYLPREDNISOLONE SODIUM SUCCINATE 40 MG: 40 INJECTION, POWDER, FOR SOLUTION INTRAMUSCULAR; INTRAVENOUS at 03:25

## 2017-03-25 RX ADMIN — Medication 10 ML: at 20:55

## 2017-03-25 RX ADMIN — METHYLPREDNISOLONE SODIUM SUCCINATE 40 MG: 40 INJECTION, POWDER, FOR SOLUTION INTRAMUSCULAR; INTRAVENOUS at 15:12

## 2017-03-25 RX ADMIN — HYDRALAZINE HYDROCHLORIDE 10 MG: 20 INJECTION INTRAMUSCULAR; INTRAVENOUS at 15:33

## 2017-03-25 RX ADMIN — FUROSEMIDE 40 MG: 10 INJECTION, SOLUTION INTRAMUSCULAR; INTRAVENOUS at 08:42

## 2017-03-25 RX ADMIN — METHYLPREDNISOLONE SODIUM SUCCINATE 40 MG: 40 INJECTION, POWDER, FOR SOLUTION INTRAMUSCULAR; INTRAVENOUS at 20:55

## 2017-03-25 RX ADMIN — MAGNESIUM OXIDE TAB 400 MG (241.3 MG ELEMENTAL MG) 400 MG: 400 (241.3 MG) TAB at 08:43

## 2017-03-25 RX ADMIN — METHYLPREDNISOLONE SODIUM SUCCINATE 40 MG: 40 INJECTION, POWDER, FOR SOLUTION INTRAMUSCULAR; INTRAVENOUS at 08:42

## 2017-03-25 RX ADMIN — IPRATROPIUM BROMIDE AND ALBUTEROL SULFATE 3 ML: 2.5; .5 SOLUTION RESPIRATORY (INHALATION) at 14:58

## 2017-03-25 RX ADMIN — Medication 10 ML: at 03:25

## 2017-03-25 RX ADMIN — IPRATROPIUM BROMIDE AND ALBUTEROL SULFATE 3 ML: 2.5; .5 SOLUTION RESPIRATORY (INHALATION) at 10:45

## 2017-03-25 RX ADMIN — CARVEDILOL 25 MG: 25 TABLET, FILM COATED ORAL at 08:43

## 2017-03-25 RX ADMIN — MAGNESIUM OXIDE TAB 400 MG (241.3 MG ELEMENTAL MG) 400 MG: 400 (241.3 MG) TAB at 17:27

## 2017-03-25 RX ADMIN — CARVEDILOL 25 MG: 25 TABLET, FILM COATED ORAL at 18:00

## 2017-03-25 RX ADMIN — ENOXAPARIN SODIUM 30 MG: 30 INJECTION SUBCUTANEOUS at 08:42

## 2017-03-25 RX ADMIN — INSULIN DETEMIR 20 UNITS: 100 INJECTION, SOLUTION SUBCUTANEOUS at 20:54

## 2017-03-25 NOTE — CONSULTS
Cardiology Consultation Note.        Patient Name: Jamil Olivo  Age/Sex: 51 y.o. male  : 1965  MRN: [id number]    Date of consultation: 3/24/2017  Consulting Physician: Robin Yi MD  Primary care Physician: John Hyde MD  Requesting Physician:   Robert Zayas MD   Reason for consultation: Shortness of breath congestive heart failure  Subjective:       Chief Complaint: Shortness of breath.      History of Present Illness:  Jamil Olivo is a 51 y.o. male     Body mass index is 33.53 kg/(m^2). -American male with a past medical history significant for arterial hypertension, hypertensive heart disease, non-insulin-dependent diabetes, chronic kidney disease secondary to diabetes, history of congestive heart failure, left ventricular systolic dysfunction with an ejection fraction of 25%, mild-to-moderate tricuspid regurgitation, obesity.  Patient presented to the emergency room with symptoms of shortness of breath at [facility].  Patient on initial evaluation at [facility] was found to be in congestive heart failure and was subsequently transferred to [facility].  Patient was subsequently treated medically with diuretic and isosorbide.    Patient denies previous cardiac evaluation.  Patient denies any hemoptysis hematuria bright red blood per rectum.  Patient denies any lower extremity edema.  Patient on initial evaluation had a positive troponin suggestive for non-Q myocardial infarction with some component of rhabdomyolysis.  Patient on specific questioning denies any symptoms of chest pain    Patient 10 point review of system except for stated in the history of present illness is negative.    Past Medical History:  1.  Shortness of breath.  2.  Left ventricular systolic dysfunction with an ejection fraction of 20-25%.  3.  Mild-to-moderate tricuspid regurgitation.  4.  Arterial hypertension.  5.  Hypertensive heart disease.  6.  Insulin-requiring  diabetes.  7.  Diabetic nephropathy.  8.  Obesity with a body mask index of 33.    Past Surgical History:  None    Family History: No history of premature atherosclerotic coronary artery disease  Family History   Problem Relation Age of Onset   • Hypertension Mother    • Diabetes Mother    • Hypertension Father    • Diabetes Father    • Asthma Maternal Grandfather        Social History: As any tobacco or alcohol intake  Social History     Social History   • Marital status:      Spouse name: N/A   • Number of children: N/A   • Years of education: N/A     Occupational History   • Not on file.     Social History Main Topics   • Smoking status: Never Smoker   • Smokeless tobacco: Not on file   • Alcohol use No   • Drug use: Not on file   • Sexual activity: Not on file     Other Topics Concern   • Not on file     Social History Narrative        Cardiac Risk factor: Male, Hypertension, Diabetes and Obesity      Allergies:  Allergies   Allergen Reactions   • Motrin [Ibuprofen] Hives       Medication::  Prescriptions Prior to Admission   Medication Sig Dispense Refill Last Dose   • amLODIPine (NORVASC) 10 MG tablet Take 10 mg by mouth Daily.   3/22/2017 at Unknown time   • benazepril (LOTENSIN) 20 MG tablet Take 40 mg by mouth Daily.   3/22/2017 at Unknown time   • carvedilol (COREG) 25 MG tablet Take 25 mg by mouth 2 (Two) Times a Day With Meals.   3/22/2017 at Unknown time   • carvedilol CR (COREG CR) 40 MG 24 hr capsule Take 40 mg by mouth Daily.   3/22/2017 at Unknown time   • CARVEDILOL PO Take  by mouth.   3/22/2017 at Unknown time   • furosemide (LASIX) 40 MG tablet Take 40 mg by mouth Daily.   3/22/2017 at Unknown time   • glipiZIDE (GLUCOTROL) 10 MG tablet Take 10 mg by mouth Every Morning Before Breakfast.   3/22/2017 at Unknown time   • hydrochlorothiazide (HYDRODIURIL) 25 MG tablet Take 25 mg by mouth Daily.   3/22/2017 at Unknown time   • metFORMIN (GLUCOPHAGE) 1000 MG tablet Take 1,000 mg by mouth 2  (Two) Times a Day With Meals.   3/22/2017 at Unknown time           Review of Systems:       Constitutional:  Denies recent weight loss, weight gain, fever or chills, no change in exercise tolerance     HENT:  Denies any hearing loss, epistaxis, hoarseness, or difficulty speaking.     Eyes: Wears eyeglasses or contact lenses     Respiratory:  Denies dyspnea with exertion,no cough, wheezing, or hemoptysis.     Cardiovascular: Positive for shortness of breath Negative for palpations, chest pain, orthopnea, PND, peripheral edema, syncope, or claudication.     Gastrointestinal:  Denies change in bowel habits, dyspepsia, ulcer disease, hematochezia, or melena.  No nausea, no vomiting, no hematemesis, no diarrhea or constipation, no melena      Endocrine: Negative for cold intolerance, heat intolerance, polydipsia, polyphagia and polyuria. Denies any history of weight change, heat/cold intolerance, polydipsia, polyuria     Genitourinary: Negative hor hematuria.      Musculoskeletal: Denies any history of arthritic symptoms or back problems .  No joint pain, joint stiffness, joint swelling, muscle pain, muscle weakness and neck pain    Skin:  Denies any change in hair or nails, rashes, or skin lesions.     Allergic/Immunologic: Negative.  Negative for environmental allergies, food allergies and immunocompromised state.     Neurological:  Denies any history of recurrent headaches, strokes, TIA, or seizure disorder.     Hematological: Denies excessive bleeding, easy bruising, fatigue, lymphadenopathy and petechiae or any bleeding disorders, or lymphadenopathy.     Psychiatric/Behavioral: Denies any history of depression, substance abuse, or change in cognitive function. Denies any psychomotor reaction or tangential thought.  No depression, homicidal ideations and suicidal ideations    Endocrine: No frequent urination and nocturia, temperature intolerance, weight gain, unintended and weight loss,  unintended            Objective:     Objective:  Vitals:    03/24/17 2329   BP: 135/89   Pulse: 75   Resp: 20   Temp: 98.3 °F (36.8 °C)   SpO2: 91%     .    Body mass index is 33.53 kg/(m^2).           Physical Exam:   General Appearance:    Alert, oriented, cooperative, in no acute distress   Head:    Normocephalic, atraumatic, without obvious abnormality   Eyes:           ADIN  Lids and lashes normal, conjunctivae and sclerae normal, no icterus, no pallor   Ears:    Ears appear intact with no abnormalities noted   Throat:   Mucous membranes pink and moist   Neck:   Supple, trachea midline, no carotid bruit, no organomegaly or JVD   Lungs:     Clear to auscultation and percussion, respirations regular, even and Unlabored. No wheezes, rales, rhonchi    Heart:    Regular rhythm and normal rate, normal S1 and S2, no            murmur, no gallop, no rub, no click   Abdomen:     Soft, non-tender, non-distended, no guarding, no rebound tenderness, Normal bowel sounds in all four quadrant, no masses, liver and spleen nonpalpable,    Genitalia:    Deferred   Extremities:   Moves all extremities well, no edema, no cyanosis, no              Redness, no clubbing   Pulses:   Pulses palpable and equal bilaterally   Skin:   Moist and warm. No bleeding, bruising or rash   Neurologic/Psychiatric:   Alert and oriented to person, place, and time.  Motor, power and tone in upper and lower extremity is grossly intact.  No focal neurological deficits. Normal cognitive function. No psychomotor reaction or tangential thought. No depression, homicidal ideations and suicidal ideations           Lab Review:       Results from last 7 days  Lab Units 03/24/17  1151   SODIUM mmol/L 134*   POTASSIUM mmol/L 5.1   CHLORIDE mmol/L 97   TOTAL CO2 mmol/L 26.0   BUN mg/dL 48*   CREATININE mg/dL 2.55*   CALCIUM mg/dL 7.8*   BILIRUBIN mg/dL 0.5   ALK PHOS U/L 58   ALT (SGPT) U/L 23   AST (SGOT) U/L 61*   GLUCOSE mg/dL 527*       Results from last 7  days  Lab Units 03/24/17  2251 03/24/17  1545 03/24/17  1151   CK TOTAL U/L  --   --  4335*   TROPONIN I ng/mL 0.093* 0.120* 0.129*           Results from last 7 days  Lab Units 03/24/17  1102   WBC 10*3/mm3 8.02   HEMOGLOBIN g/dL 12.5*   HEMATOCRIT % 38.3*   PLATELETS 10*3/mm3 231               Results from last 7 days  Lab Units 03/24/17  0600   CHOLESTEROL mg/dL 265*   TRIGLYCERIDES mg/dL 129   HDL CHOL mg/dL 46*       Results from last 7 days  Lab Units 03/24/17  0600   TSH mIU/mL 1.280       EKG:   ECG/EMG Results (last 24 hours)     Procedure Component Value Units Date/Time    SCANNED EKG [[id number]] Resulted:  03/23/17      Updated:  03/24/17 1312    SCANNED EKG [[id number]] Resulted:  03/23/17      Updated:  03/24/17 1312    STAT Adult Transthoracic Echo Complete [[id number]] Collected:  03/24/17 0848     Updated:  03/24/17 2322      CV ECHO TIRSO - RVDD 3.3 cm      IVSd 1.3 cm      LVIDd 7.0 cm      LVIDs 6.5 cm      LVPWd 1.1 cm      IVS/LVPW 1.2     FS 6.9 %      EDV(Teich) 255.5 ml      ESV(Teich) 217.3 ml      EF(Teich) 15.0 %      EDV(cubed) 343.1 ml      ESV(cubed) 276.8 ml      EF(cubed) 19.3 %      LV mass(C)d 419.1 grams      SV(Teich) 38.2 ml      SV(cubed) 66.4 ml      Ao root diam 3.7 cm      Ao root area 11.0 cm^2      ACS 2.5 cm      LA dimension 5.4 cm      LA/Ao 1.4     LVOT diam 2.4 cm      LVOT area 4.6 cm^2      LVOT area(traced) 4.5 cm^2      LVLd ap4 10.3 cm      EDV(MOD-sp4) 224.0 ml      LVLs ap4 9.9 cm      ESV(MOD-sp4) 191.0 ml      EF(MOD-sp4) 14.7 %      SV(MOD-sp4) 33.0 ml      CONTRAST EF 4CH 14.7 ml/m^2      MV E max destini 87.5 cm/sec      MV A max destini 36.4 cm/sec      MV E/A 2.4     MV V2 max 101.4 cm/sec      MV max PG 4.1 mmHg      MV V2 mean 63.8 cm/sec      MV mean PG 1.9 mmHg      MV V2 VTI 21.3 cm      MVA(VTI) 2.4 cm^2      MV P1/2t max destini 104.4 cm/sec      MV P1/2t 47.3 msec      MVA(P1/2t) 4.6 cm^2      MV dec slope 646.1 cm/sec^2      Ao pk destini 95.3 cm/sec      Ao max PG  3.6 mmHg      Ao max PG (full) 1.6 mmHg      Ao V2 mean 65.7 cm/sec      Ao mean PG 2.0 mmHg      Ao mean PG (full) 0.99 mmHg      Ao V2 VTI 16.4 cm      MO(I,A) 3.1 cm^2      MO(I,D) 3.1 cm^2      MO(V,A) 3.4 cm^2      MO(V,D) 3.4 cm^2      LV V1 max PG 2.0 mmHg      LV V1 mean PG 1.0 mmHg      LV V1 max 70.6 cm/sec      LV V1 mean 44.9 cm/sec      LV V1 VTI 11.1 cm      MR max destini 399.3 cm/sec      MR max PG 63.8 mmHg      SV(Ao) 180.4 ml      SV(LVOT) 51.2 ml      PA V2 max 59.7 cm/sec      PA max PG 1.4 mmHg      TR max destini 280.4 cm/sec      RVSP(TR) 52.1 mmHg      RAP systole 20.0 mmHg      MVA P1/2T LCG 2.1 cm^2     Narrative:       · The left ventricular cavity is moderately dilated.  · Left ventricular wall thickness is consistent with a thin walled   ventricle.  · Left ventricular function is severely decreased.  · Mild to moderate tricuspid valve regurgitation is present.  · Mild mitral valve regurgitation is present  · Left atrial cavity size is borderline dilated.             Imaging:  Imaging Results (last 24 hours)     ** No results found for the last 24 hours. **          I personally viewed and interpreted the patient's EKG/Telemetry data.    Assessment:   1.  Shortness of breath.  2.  Left ventricular systolic dysfunction.  3.  Arterial hypertension.  4.  Hypertensive heart disease.  5.  Mild to moderate tricuspid regurgitation.          Plan:   1.  Shortness of breath left ventricular systolic dysfunction with an ejection fraction of 20-25%.  Clinically at the present time patient is  In a compensated state for congestive heart failure patient at the present time has been recommended to continue with the present dose of the Lasix and preload reduction with isosorbide.  Would be started on a low dose of digoxin and would add magnesium oxide.  Have discussed with the patient further diagnostic evaluation and the risk of sudden cardiac death with the left ventricular systolic dysfunction.  Patient  is not a candidate for further evaluation with a coronary angiogram due to the patient's chronic kidney disease.  2.  Arterial hypertension.  Patient blood pressure is 127/78.  Patient would be continued on the present dose of the Coreg.  Patient has been counseled to decrease his salt intake.  3.  Obesity.  Patient body mass index is 33 patient has been counseled on low-fat low-cholesterol diet and weight reduction.  4.  Chronic kidney disease.  Patient has an elevated BUN and creatinine most likely secondary to diabetic nephropathy.  5.  Insulin-requiring diabetes.  Patient has been counseled on [employer] diet.  6.  Elevated troponin suggestive for non-Q myocardial infarction.  Patient had mild elevation in the troponin with elevated CK most likely secondary to rhabdomyolysis.  Have discussed with the patient left ventricular systolic dysfunction and the need for further evaluation to rule out underlying coronary artery disease.  Due to the patient chronic kidney disease patient is not a candidate for any invasive evaluation.  Patient has opted for medical management.          Time: time spent in face-to-face evaluation off greater than 55 minutes and interacting and formulating examining and discussing the plan with the patient with 50% of greater time spent in face-to-face interaction.    Robin Yi MD  03/24/17  11:52 PM       EMR Dragon/Transcription disclaimer:   Some of this note may be an electronic transcription/translation of spoken language to printed text. The electronic translation of spoken language may permit erroneous, or at times, nonsensical words or phrases to be inadvertently transcribed; Although I have reviewed the note for such errors, some may still exist.

## 2017-03-25 NOTE — PLAN OF CARE
Problem: Patient Care Overview (Adult)  Goal: Plan of Care Review  Outcome: Ongoing (interventions implemented as appropriate)    03/25/17 1230   Coping/Psychosocial Response Interventions   Plan Of Care Reviewed With patient;spouse   Patient Care Overview   Progress improving       Goal: Adult Individualization and Mutuality  Outcome: Ongoing (interventions implemented as appropriate)  Goal: Discharge Needs Assessment  Outcome: Ongoing (interventions implemented as appropriate)    Problem: Cardiac: Heart Failure (Adult)  Goal: Signs and Symptoms of Listed Potential Problems Will be Absent or Manageable (Cardiac: Heart Failure)  Outcome: Ongoing (interventions implemented as appropriate)    Problem: Diabetes, Type 2 (Adult)  Goal: Signs and Symptoms of Listed Potential Problems Will be Absent or Manageable (Diabetes, Type 2)  Outcome: Ongoing (interventions implemented as appropriate)

## 2017-03-25 NOTE — PLAN OF CARE
Problem: Patient Care Overview (Adult)  Goal: Plan of Care Review  Outcome: Ongoing (interventions implemented as appropriate)    03/25/17 0620   Coping/Psychosocial Response Interventions   Plan Of Care Reviewed With patient   Patient Care Overview   Progress progress towards functional goals is fair   Outcome Evaluation   Outcome Summary/Follow up Plan NSR, O2 2L NC 92-95%, RA 83%.       Goal: Adult Individualization and Mutuality  Outcome: Ongoing (interventions implemented as appropriate)  Goal: Discharge Needs Assessment  Outcome: Ongoing (interventions implemented as appropriate)    Problem: Cardiac: Heart Failure (Adult)  Goal: Signs and Symptoms of Listed Potential Problems Will be Absent or Manageable (Cardiac: Heart Failure)  Outcome: Ongoing (interventions implemented as appropriate)    Problem: Diabetes, Type 2 (Adult)  Goal: Signs and Symptoms of Listed Potential Problems Will be Absent or Manageable (Diabetes, Type 2)  Outcome: Ongoing (interventions implemented as appropriate)

## 2017-03-25 NOTE — PROGRESS NOTES
Progress Note  Kee Crockett MD  Hospitalist     LOS: 2 days   Patient Care Team:  John Hyde MD as PCP - General (Family Medicine)    Chief Complaint: dyspnea    Subjective     Interval History:     Patient Complaints: shortness of breath, minimal leg edema    History taken from: patient    Medication Review:   Current Facility-Administered Medications   Medication Dose Route Frequency Provider Last Rate Last Dose   • acetaminophen (TYLENOL) tablet 650 mg  650 mg Oral Q4H PRN Robert Zayas MD   650 mg at 03/23/17 2334   • albuterol (PROVENTIL) nebulizer solution 0.083% 2.5 mg/3mL  2.5 mg Nebulization Q4H PRN Robert Zayas MD   2.5 mg at 03/23/17 2208   • carvedilol (COREG) tablet 25 mg  25 mg Oral BID With Meals Robert Zayas MD   25 mg at 03/25/17 0843   • dextrose (D50W) solution 25 g  25 g Intravenous Q15 Min PRN Robert Zayas MD       • dextrose (GLUTOSE) oral gel 15 g  15 g Oral Q15 Min PRN Robert Zayas MD       • enoxaparin (LOVENOX) syringe 30 mg  30 mg Subcutaneous Daily Robert Zayas MD   30 mg at 03/25/17 0842   • furosemide (LASIX) injection 40 mg  40 mg Intravenous BID Robert Zayas MD   40 mg at 03/25/17 0842   • glucagon (human recombinant) (GLUCAGEN DIAGNOSTIC) injection 1 mg  1 mg Subcutaneous Q15 Min PRN Robert Zayas MD       • hydrALAZINE (APRESOLINE) injection 10 mg  10 mg Intravenous Q6H PRN Robert Zayas MD       • insulin aspart (novoLOG) injection 0-14 Units  0-14 Units Subcutaneous 4x Daily AC & at Bedtime Robert Zayas MD   14 Units at 03/25/17 1106   • insulin detemir (LEVEMIR) injection 20 Units  20 Units Subcutaneous Nightly Kee Crockett MD       • ipratropium-albuterol (DUO-NEB) nebulizer solution 3 mL  3 mL Nebulization 4x Daily - RT Bernadette Flaherty MD   3 mL at 03/25/17 1045   • isosorbide mononitrate (IMDUR) 24 hr tablet 60 mg  60 mg Oral Q24H Robert Zayas MD   60 mg at 03/25/17 0843   • levoFLOXacin (LEVAQUIN) 250 mg/50 mL D5W (premix)  250 mg  250 mg Intravenous Q24H Bernadette Flaherty MD   250 mg at 03/24/17 1641   • magnesium oxide (MAGOX) tablet 400 mg  400 mg Oral BID Robin Yi MD   400 mg at 03/25/17 0843   • methylPREDNISolone sodium succinate (SOLU-Medrol) injection 40 mg  40 mg Intravenous Q6H Robert Zayas MD   40 mg at 03/25/17 0842   • ondansetron (ZOFRAN) tablet 4 mg  4 mg Oral Q6H PRN Robert Zayas MD       • pneumococcal polysaccharide 23-valent (PNEUMOVAX-23) vaccine 0.5 mL  0.5 mL Intramuscular During Hospitalization Robert Zayas MD       • sodium chloride 0.9 % flush 1-10 mL  1-10 mL Intravenous PRN Robert Zayas MD   10 mL at 03/25/17 0325       Review of Systems:   Review of Systems   Constitutional: Positive for fatigue.   Respiratory: Positive for shortness of breath and wheezing.    Cardiovascular: Positive for leg swelling. Negative for chest pain and palpitations.   Gastrointestinal: Negative for abdominal distention, abdominal pain, constipation and diarrhea.   Genitourinary: Negative for dysuria and urgency.   Musculoskeletal: Positive for back pain.   Neurological: Positive for weakness, light-headedness and numbness.   Psychiatric/Behavioral: Negative for agitation and confusion.   All other systems reviewed and are negative.      Objective     Vital Signs  Temp:  [96.7 °F (35.9 °C)-98.7 °F (37.1 °C)] 98.2 °F (36.8 °C)  Heart Rate:  [69-84] 70  Resp:  [18-20] 18  BP: (127-166)/() 130/84    Physical Exam:  Physical Exam   Constitutional: He is oriented to person, place, and time. He appears well-developed and well-nourished.   HENT:   Head: Normocephalic and atraumatic.   Eyes: EOM are normal. No scleral icterus.   Neck: No thyromegaly present.   Cardiovascular: Normal rate and regular rhythm.    Pulmonary/Chest: Effort normal. He has no wheezes. He has rales.   Abdominal: Soft. Bowel sounds are normal. He exhibits no distension. There is no tenderness.   Musculoskeletal: He exhibits edema  (minimal).   Neurological: He is alert and oriented to person, place, and time.   Skin: Skin is warm and dry. No erythema.   Psychiatric: He has a normal mood and affect. His behavior is normal.   Nursing note and vitals reviewed.       Results Review:    Lab Results (last 24 hours)     Procedure Component Value Units Date/Time    Comprehensive Metabolic Panel [[id number]]  (Abnormal) Collected:  03/24/17 1151    Specimen:  Blood Updated:  03/24/17 1231     Glucose 527 (C) mg/dL      BUN 48 (H) mg/dL      Creatinine 2.55 (H) mg/dL      Sodium 134 (L) mmol/L      Potassium 5.1 mmol/L      Chloride 97 mmol/L      CO2 26.0 mmol/L      Calcium 7.8 (L) mg/dL      Total Protein 6.1 (L) g/dL      Albumin 3.20 (L) g/dL      ALT (SGPT) 23 U/L      AST (SGOT) 61 (H) U/L      Alkaline Phosphatase 58 U/L      Total Bilirubin 0.5 mg/dL      eGFR   Amer 32 (L) mL/min/1.73      Globulin 2.9 gm/dL      A/G Ratio 1.1 g/dL      BUN/Creatinine Ratio 18.8     Anion Gap 11.0 mmol/L     CK [[id number]]  (Abnormal) Collected:  03/24/17 1151    Specimen:  Blood Updated:  03/24/17 1232     Creatine Kinase 4335 (H) U/L     Troponin [[id number]]  (Abnormal) Collected:  03/24/17 1151    Specimen:  Blood Updated:  03/24/17 1244     Troponin I 0.129 (C) ng/mL     CK-MB [[id number]]  (Normal) Collected:  03/24/17 1105    Specimen:  Blood Updated:  03/24/17 1313     CKMB 3.16 ng/mL     D-dimer, Quantitative [[id number]]  (Abnormal) Collected:  03/24/17 1545    Specimen:  Blood Updated:  03/24/17 1627     D-Dimer, Quantitative 712 (H) ng/mL (FEU)     Narrative:       Dimer values <500 ng/ml FEU are FDA approved as aid in diagnosis of deep venous thrombosis and pulmonary embolism.  This test should not be used in an exclusion strategy with pretest probability alone.    A recent guideline regarding diagnosis for pulmonary thomboembolism recommends an adjusted exclusion criterion of age x 10 ng/ml FEU for patients >50 years of age ([employer] 2015;  163: 701-711).    Troponin [[id number]]  (Abnormal) Collected:  03/24/17 1545    Specimen:  Blood Updated:  03/24/17 1634     Troponin I 0.120 (H) ng/mL     Troponin [[id number]]  (Abnormal) Collected:  03/24/17 2251    Specimen:  Blood Updated:  03/24/17 2324     Troponin I 0.093 (H) ng/mL     Blood Culture [[id number]]  (Normal) Collected:  03/24/17 1545    Specimen:  Blood from Arm, Right Updated:  03/25/17 0501     Blood Culture No growth at less than 24 hours    Blood Culture [[id number]]  (Normal) Collected:  03/24/17 1545    Specimen:  Blood from Arm, Left Updated:  03/25/17 0501     Blood Culture No growth at less than 24 hours    CBC & Differential [[id number]] Collected:  03/25/17 0511    Specimen:  Blood Updated:  03/25/17 0547    Narrative:       The following orders were created for panel order CBC & Differential.  Procedure                               Abnormality         Status                     ---------                               -----------         ------                     CBC Auto Differential[[id number]]         Abnormal            Final result                 Please view results for these tests on the individual orders.    CBC Auto Differential [[id number]]  (Abnormal) Collected:  03/25/17 0511    Specimen:  Blood Updated:  03/25/17 0547     WBC 8.22 10*3/mm3      RBC 3.95 (L) 10*6/mm3      Hemoglobin 11.3 (L) g/dL      Hematocrit 33.3 (L) %      MCV 84.3 fL      MCH 28.6 pg      MCHC 33.9 g/dL      RDW 13.3 %      RDW-SD 40.8 fl      MPV 9.6 fL      Platelets 265 10*3/mm3      Neutrophil % 91.9 (H) %      Lymphocyte % 4.3 (L) %      Monocyte % 3.6 %      Eosinophil % 0.0 %      Basophil % 0.0 %      Immature Grans % 0.2 %      Neutrophils, Absolute 7.55 10*3/mm3      Lymphocytes, Absolute 0.35 (L) 10*3/mm3      Monocytes, Absolute 0.30 10*3/mm3      Eosinophils, Absolute 0.00 10*3/mm3      Basophils, Absolute 0.00 10*3/mm3      Immature Grans, Absolute 0.02 10*3/mm3     Comprehensive Metabolic Panel  [[id number]]  (Abnormal) Collected:  03/25/17 0511    Specimen:  Blood Updated:  03/25/17 0555     Glucose 399 (H) mg/dL      BUN 64 (H) mg/dL      Creatinine 2.65 (H) mg/dL      Sodium 135 (L) mmol/L      Potassium 4.9 mmol/L      Chloride 100 mmol/L      CO2 24.0 mmol/L      Calcium 7.8 (L) mg/dL      Total Protein 6.1 (L) g/dL      Albumin 3.20 (L) g/dL      ALT (SGPT) 24 U/L      AST (SGOT) 56 U/L      Alkaline Phosphatase 66 U/L      Total Bilirubin 0.5 mg/dL      eGFR   Amer 31 (L) mL/min/1.73      Globulin 2.9 gm/dL      A/G Ratio 1.1 g/dL      BUN/Creatinine Ratio 24.2     Anion Gap 11.0 mmol/L           Imaging Results (last 24 hours)     ** No results found for the last 24 hours. **          Assessment/Plan     Principal Problem:    Systolic CHF / acute on chronic  Active Problems:    Respiratory failure    Diabetes mellitus    Hypertension    CKD (chronic kidney disease) stage 4, GFR 15-29 ml/min    Continue with diuresis / Increase Insulin - uncontrolled glucose values    Kee Crockett MD  03/25/17  12:14 PM

## 2017-03-26 VITALS
SYSTOLIC BLOOD PRESSURE: 135 MMHG | OXYGEN SATURATION: 94 % | BODY MASS INDEX: 32.82 KG/M2 | HEART RATE: 72 BPM | WEIGHT: 234.44 LBS | TEMPERATURE: 97.9 F | HEIGHT: 71 IN | DIASTOLIC BLOOD PRESSURE: 88 MMHG | RESPIRATION RATE: 18 BRPM

## 2017-03-26 PROBLEM — I50.23 ACUTE ON CHRONIC SYSTOLIC (CONGESTIVE) HEART FAILURE: Chronic | Status: ACTIVE | Noted: 2017-03-26

## 2017-03-26 LAB
ALBUMIN SERPL-MCNC: 3.4 G/DL (ref 3.4–4.8)
ALBUMIN/GLOB SERPL: 1.2 G/DL (ref 1.1–1.8)
ALP SERPL-CCNC: 63 U/L (ref 38–126)
ALT SERPL W P-5'-P-CCNC: 25 U/L (ref 21–72)
ANION GAP SERPL CALCULATED.3IONS-SCNC: 14 MMOL/L (ref 5–15)
AST SERPL-CCNC: 47 U/L (ref 17–59)
BASOPHILS # BLD AUTO: 0.01 10*3/MM3 (ref 0–0.2)
BASOPHILS NFR BLD AUTO: 0.1 % (ref 0–2)
BILIRUB SERPL-MCNC: 0.5 MG/DL (ref 0.2–1.3)
BUN BLD-MCNC: 71 MG/DL (ref 7–21)
BUN/CREAT SERPL: 24.5 (ref 7–25)
CALCIUM SPEC-SCNC: 7.9 MG/DL (ref 8.4–10.2)
CHLORIDE SERPL-SCNC: 98 MMOL/L (ref 95–110)
CO2 SERPL-SCNC: 25 MMOL/L (ref 22–31)
CREAT BLD-MCNC: 2.9 MG/DL (ref 0.7–1.3)
DEPRECATED RDW RBC AUTO: 41.8 FL (ref 35.1–43.9)
EOSINOPHIL # BLD AUTO: 0 10*3/MM3 (ref 0–0.7)
EOSINOPHIL NFR BLD AUTO: 0 % (ref 0–7)
ERYTHROCYTE [DISTWIDTH] IN BLOOD BY AUTOMATED COUNT: 13.2 % (ref 11.5–14.5)
GFR SERPL CREATININE-BSD FRML MDRD: 28 ML/MIN/1.73 (ref 56–130)
GLOBULIN UR ELPH-MCNC: 2.9 GM/DL (ref 2.3–3.5)
GLUCOSE BLD-MCNC: 312 MG/DL (ref 60–100)
GLUCOSE BLDC GLUCOMTR-MCNC: 325 MG/DL (ref 70–130)
GLUCOSE BLDC GLUCOMTR-MCNC: 401 MG/DL (ref 70–130)
GLUCOSE BLDC GLUCOMTR-MCNC: 414 MG/DL (ref 70–130)
HCT VFR BLD AUTO: 36.9 % (ref 39–49)
HGB BLD-MCNC: 12.3 G/DL (ref 13.7–17.3)
IMM GRANULOCYTES # BLD: 0.03 10*3/MM3 (ref 0–0.02)
IMM GRANULOCYTES NFR BLD: 0.3 % (ref 0–0.5)
LYMPHOCYTES # BLD AUTO: 0.4 10*3/MM3 (ref 0.6–4.2)
LYMPHOCYTES NFR BLD AUTO: 3.5 % (ref 10–50)
MCH RBC QN AUTO: 28.4 PG (ref 26.5–34)
MCHC RBC AUTO-ENTMCNC: 33.3 G/DL (ref 31.5–36.3)
MCV RBC AUTO: 85.2 FL (ref 80–98)
MONOCYTES # BLD AUTO: 0.32 10*3/MM3 (ref 0–0.9)
MONOCYTES NFR BLD AUTO: 2.8 % (ref 0–12)
NEUTROPHILS # BLD AUTO: 10.51 10*3/MM3 (ref 2–8.6)
NEUTROPHILS NFR BLD AUTO: 93.3 % (ref 37–80)
NRBC BLD MANUAL-RTO: 0 /100 WBC (ref 0–0)
PLATELET # BLD AUTO: 288 10*3/MM3 (ref 150–450)
PMV BLD AUTO: 9.9 FL (ref 8–12)
POTASSIUM BLD-SCNC: 4.3 MMOL/L (ref 3.5–5.1)
PROT SERPL-MCNC: 6.3 G/DL (ref 6.3–8.6)
RBC # BLD AUTO: 4.33 10*6/MM3 (ref 4.37–5.74)
SODIUM BLD-SCNC: 137 MMOL/L (ref 137–145)
WBC NRBC COR # BLD: 11.27 10*3/MM3 (ref 3.2–9.8)

## 2017-03-26 PROCEDURE — 85025 COMPLETE CBC W/AUTO DIFF WBC: CPT | Performed by: INTERNAL MEDICINE

## 2017-03-26 PROCEDURE — 80053 COMPREHEN METABOLIC PANEL: CPT | Performed by: INTERNAL MEDICINE

## 2017-03-26 PROCEDURE — 25010000002 ENOXAPARIN PER 10 MG: Performed by: INTERNAL MEDICINE

## 2017-03-26 PROCEDURE — 82962 GLUCOSE BLOOD TEST: CPT

## 2017-03-26 PROCEDURE — 94799 UNLISTED PULMONARY SVC/PX: CPT

## 2017-03-26 PROCEDURE — 63710000001 INSULIN ASPART PER 5 UNITS: Performed by: INTERNAL MEDICINE

## 2017-03-26 PROCEDURE — 25010000002 FUROSEMIDE PER 20 MG: Performed by: INTERNAL MEDICINE

## 2017-03-26 PROCEDURE — 94760 N-INVAS EAR/PLS OXIMETRY 1: CPT

## 2017-03-26 PROCEDURE — 25010000002 METHYLPREDNISOLONE PER 40 MG: Performed by: INTERNAL MEDICINE

## 2017-03-26 RX ORDER — ACETAMINOPHEN 325 MG/1
650 TABLET ORAL EVERY 6 HOURS PRN
Qty: 1 BOTTLE | Refills: 1 | Status: SHIPPED | OUTPATIENT
Start: 2017-03-26 | End: 2018-11-21 | Stop reason: HOSPADM

## 2017-03-26 RX ORDER — ALBUTEROL SULFATE 90 UG/1
2 AEROSOL, METERED RESPIRATORY (INHALATION) EVERY 4 HOURS PRN
Qty: 1 INHALER | Refills: 1 | Status: SHIPPED | OUTPATIENT
Start: 2017-03-26

## 2017-03-26 RX ORDER — ISOSORBIDE MONONITRATE 60 MG/1
60 TABLET, EXTENDED RELEASE ORAL
Qty: 30 TABLET | Refills: 1 | Status: SHIPPED | OUTPATIENT
Start: 2017-03-26 | End: 2019-04-15 | Stop reason: ALTCHOICE

## 2017-03-26 RX ADMIN — ISOSORBIDE MONONITRATE 60 MG: 60 TABLET, EXTENDED RELEASE ORAL at 08:37

## 2017-03-26 RX ADMIN — METHYLPREDNISOLONE SODIUM SUCCINATE 40 MG: 40 INJECTION, POWDER, FOR SOLUTION INTRAMUSCULAR; INTRAVENOUS at 03:30

## 2017-03-26 RX ADMIN — INSULIN ASPART 10 UNITS: 100 INJECTION, SOLUTION INTRAVENOUS; SUBCUTANEOUS at 07:39

## 2017-03-26 RX ADMIN — FUROSEMIDE 40 MG: 10 INJECTION, SOLUTION INTRAMUSCULAR; INTRAVENOUS at 08:37

## 2017-03-26 RX ADMIN — MAGNESIUM OXIDE TAB 400 MG (241.3 MG ELEMENTAL MG) 400 MG: 400 (241.3 MG) TAB at 08:37

## 2017-03-26 RX ADMIN — Medication 10 ML: at 03:30

## 2017-03-26 RX ADMIN — ENOXAPARIN SODIUM 30 MG: 30 INJECTION SUBCUTANEOUS at 08:37

## 2017-03-26 RX ADMIN — CARVEDILOL 25 MG: 25 TABLET, FILM COATED ORAL at 08:37

## 2017-03-26 RX ADMIN — METHYLPREDNISOLONE SODIUM SUCCINATE 40 MG: 40 INJECTION, POWDER, FOR SOLUTION INTRAMUSCULAR; INTRAVENOUS at 08:37

## 2017-03-26 RX ADMIN — IPRATROPIUM BROMIDE AND ALBUTEROL SULFATE 3 ML: 2.5; .5 SOLUTION RESPIRATORY (INHALATION) at 07:17

## 2017-03-26 NOTE — DISCHARGE SUMMARY
Discharge Summary  Kee Crockett MD  Hospitalist     Date of Discharge:  3/26/2017    Discharge Diagnosis:    Principal Problem:    Acute on chronic systolic (congestive) heart failure  Active Problems:    Acute (hypoxic) Respiratory failure due to above    Diabetes mellitus - poorly controlled    Hypertension    CKD (chronic kidney disease) stage 4, GFR 15-29 ml/min      Presenting Problem/History of Present Illness    Dyspnea, cough, congestion    Hospital Course  Patient is a 51 y.o. male presented for shortness of breath cough congestion mild bilateral lower extremity edema.  He has a long-standing history of heart failure with poor systolic function [EF of 20%].  He improved with gentle diuresis, by adjusting some of his medications.    His diabetes remains poorly controlled, and his chronic kidney disease is stable [stage IV].    Procedures Performed      Consults:   Consults     Date and Time Order Name Status Description    3/23/2017 2043 Inpatient Consult to Cardiology Completed           Pertinent Test Results: cardiac graphics: Echocardiogram: poor LV EF of 20 %    Condition on Discharge:  stable    Vital Signs  Temp:  [97.9 °F (36.6 °C)-99 °F (37.2 °C)] 97.9 °F (36.6 °C)  Heart Rate:  [72-91] 72  Resp:  [18-20] 18  BP: (124-159)/(70-96) 135/88    Physical Exam:  Physical Exam   Constitutional: He is oriented to person, place, and time. He appears well-developed and well-nourished.   HENT:   Head: Normocephalic and atraumatic.   Eyes: EOM are normal. Pupils are equal, round, and reactive to light.   Cardiovascular: Normal rate and regular rhythm.  Exam reveals no gallop.    No murmur heard.  Pulmonary/Chest: Effort normal and breath sounds normal. He has no wheezes. He has no rales.   Abdominal: Soft. Bowel sounds are normal. He exhibits no distension. There is no tenderness. There is no rebound and no guarding.   Musculoskeletal: Normal range of motion. He exhibits edema (minimal). He exhibits no  deformity.   Neurological: He is alert and oriented to person, place, and time.   Skin: Skin is warm and dry.   Psychiatric: He has a normal mood and affect. His behavior is normal.   Vitals reviewed.      Discharge Disposition  Home or Self Care    Discharge Medications   Jamil Olivo   Home Medication Instructions TRACIE:[id number]    Printed on:03/26/17 4512   Medication Information                      acetaminophen (TYLENOL) 325 MG tablet  Take 2 tablets by mouth Every 6 (Six) Hours As Needed for Mild Pain (1-3) or Headache.             albuterol (PROVENTIL HFA;VENTOLIN HFA) 108 (90 BASE) MCG/ACT inhaler  Inhale 2 puffs Every 4 (Four) Hours As Needed for Wheezing.             amLODIPine (NORVASC) 10 MG tablet  Take 10 mg by mouth Daily.             benazepril (LOTENSIN) 20 MG tablet  Take 40 mg by mouth Daily.             carvedilol (COREG) 25 MG tablet  Take 25 mg by mouth 2 (Two) Times a Day With Meals.             furosemide (LASIX) 40 MG tablet  Take 40 mg by mouth Daily.             insulin detemir (LEVEMIR) 100 UNIT/ML injection  Inject 30 Units under the skin Every Night.             isosorbide mononitrate (IMDUR) 60 MG 24 hr tablet  Take 1 tablet by mouth Daily.                 Discharge Diet:   Diet Instructions     Diet: Consistent Carbohydrate, Cardiac, Renal; Thin Liquids, No Restrictions       Discharge Diet:   Consistent Carbohydrate  Cardiac  Renal      Fluid Consistency:  Thin Liquids, No Restrictions                 Activity at Discharge:   Activity Instructions     Activity as Tolerated                     Follow-up Appointments  No future appointments.  Additional Instructions for the Follow-ups that You Need to Schedule     Additional Discharge Follow-Up (Specify Provider)    As directed    To:  Dr Yi   Follow Up:  2 Weeks       Discharge Follow-up with PCP    As directed    Follow Up Details:  1 week                 Test Results Pending at Discharge   Order Current Status    POC  Glucose Fingerstick In process    POC Glucose Fingerstick In process    Blood Culture Preliminary result    Blood Culture Preliminary result           Kee Crockett MD  03/26/17  5:40 PM

## 2017-03-29 LAB
BACTERIA SPEC AEROBE CULT: NORMAL
BACTERIA SPEC AEROBE CULT: NORMAL

## 2017-07-03 ENCOUNTER — TRANSCRIBE ORDERS (OUTPATIENT)
Dept: NUCLEAR MEDICINE | Facility: HOSPITAL | Age: 52
End: 2017-07-03

## 2017-07-03 DIAGNOSIS — R06.03 ACUTE RESPIRATORY DISTRESS: Primary | ICD-10-CM

## 2017-07-03 DIAGNOSIS — I42.7 DILATED CARDIOMYOPATHY SECONDARY TO DRUG (HCC): ICD-10-CM

## 2017-07-27 NOTE — H&P
Cardiology History and Physical Note.        Patient Name: Jamil Olivo  Age/Sex: 51 y.o. male  : 1965  MRN: [id number]    Date of Admission : 2017    Primary care Physician: John Hyde MD    Reason for Admission:  Chest pain shortness of breath Lexiscan Cardiolite stress test.  Subjective:       Chief Complaint: Chest pain shortness of breath lightheaded dizziness.    History of Present Illness:  Jamil Olivo is a 51 y.o. male     There is no height or weight on file to calculate BMI. with a past medical history significant for arterial hypertension, hypertensive heart disease, non-insulin-dependent diabetes, chronic kidney disease secondary to diabetes, history of congestive heart failure, left ventricular systolic dysfunction with an ejection fraction of 25%, mild-to-moderate tricuspid regurgitation, obesity.      Patient presented for further evaluation for symptoms off shortness of breath.  Vision had undergone an echocardiogram and an EKG.  The patient echocardiogram had revealed left ventricular systolic dysfunction with an ejection fraction of 25%.  Patient complained of having symptoms of bilateral lower extremity edema along with symptoms of atypical chest pain.  Patient since 2017 has had increasing episodes of shortness of breath.  Patient blood pressure was 157/97 at home.  Patient's subsequent blood pressure today was 200/100 patient on further questioning denies any nausea vomiting patient denies any paroxysmal dyspnea or orthopnea.    Patient 10 point review of system except for stated in the history of present illness is negative      Past Medical History:    1.  Shortness of breath symptoms off chest pain.  2.  Left ventricular systolic dysfunction with an ejection fraction of 20-25%.  3.  Mild-to-moderate tricuspid regurgitation.  4.  Arterial hypertension.  5.  Hypertensive heart disease.  6.  Insulin-requiring diabetes.  7.  Diabetic nephropathy.  8.  Obesity  9.   Chronic kidney disease with a creatinine of 2.7      Past Medical History:   Diagnosis Date   • CHF (congestive heart failure)    • Diabetes mellitus    • Hypertension        Past Surgical History:    None    Family History:    No history of premature atherosclerotic coronary artery disease    Family History   Problem Relation Age of Onset   • Hypertension Mother    • Diabetes Mother    • Hypertension Father    • Diabetes Father    • Asthma Maternal Grandfather        Social History:    Social History     Social History   • Marital status:      Spouse name: N/A   • Number of children: N/A   • Years of education: N/A     Occupational History   • Not on file.     Social History Main Topics   • Smoking status: Never Smoker   • Smokeless tobacco: Not on file   • Alcohol use No   • Drug use: Not on file   • Sexual activity: Not on file     Other Topics Concern   • Not on file     Social History Narrative        Cardiac Risk factor:     1. Male   2. Arterial Hypertension  3. Diabetes  4. Obesity     Allergies:  Allergies   Allergen Reactions   • Motrin [Ibuprofen] Hives       Medication::  1.  Norvasc 10 mg every day.  2.  Magnesium oxide 400 mg twice a day.  3.  Hydralazine 25 mg 3 times a day.  4.  Isordil 20 mg 3 times a day.  5.  Lotensin 40 mg every day.  6.  Lortab when necessary.  7.  Carvedilol 25 mg twice a day.  8.  Lasix 40 mg twice a day.  9.  Pro-air inhaler        Review of Systems:       Constitutional:  Denies recent weight loss, weight gain, fever or chills, no change in exercise tolerance     HENT:  Denies any hearing loss, epistaxis, hoarseness, or difficulty speaking.     Eyes: Wears eyeglasses or contact lenses     Respiratory:  Denies dyspnea with exertion,no cough, wheezing, or hemoptysis.     Cardiovascular: Negative for palpations, chest pain, orthopnea, PND, peripheral edema, syncope, or claudication.     Gastrointestinal:  Denies change in bowel habits, dyspepsia, ulcer disease,  hematochezia, or melena.  No nausea, no vomiting, no hematemesis, no diarrhea or constipation, no melena      Endocrine: Negative for cold intolerance, heat intolerance, polydipsia, polyphagia and polyuria. Denies any history of weight change, heat/cold intolerance, polydipsia, polyuria     Genitourinary: Negative hor hematuria.      Musculoskeletal: Denies any history of arthritic symptoms or back problems .  No joint pain, joint stiffness, joint swelling, muscle pain, muscle weakness and neck pain    Skin:  Denies any change in hair or nails, rashes, or skin lesions.     Allergic/Immunologic: Negative.  Negative for environmental allergies, food allergies and immunocompromised state.     Neurological:  Denies any history of recurrent headaches, strokes, TIA, or seizure disorder.     Hematological: Denies excessive bleeding, easy bruising, fatigue, lymphadenopathy and petechiae or any bleeding disorders, or lymphadenopathy.     Psychiatric/Behavioral: Denies any history of depression, substance abuse, or change in cognitive function. Denies any psychomotor reaction or tangential thought.  No depression, homicidal ideations and suicidal ideations    Endocrine: No frequent urination and nocturia, temperature intolerance, weight gain, unintended and weight loss, unintended            Objective:     Objective:  There were no vitals filed for this visit.  .    There is no height or weight on file to calculate BMI.           Physical Exam:   General Appearance:    Alert, oriented, cooperative, in no acute distress   Head:    Normocephalic, atraumatic, without obvious abnormality   Eyes:           ADIN  Lids and lashes normal, conjunctivae and sclerae normal, no icterus, no pallor   Ears:    Ears appear intact with no abnormalities noted   Throat:   Mucous membranes pink and moist   Neck:   Supple, trachea midline, no carotid bruit, no organomegaly or JVD   Lungs:     Clear to auscultation and percussion, respirations  regular, even and Unlabored. No wheezes, rales, rhonchi    Heart:    Regular rhythm and normal rate, normal S1 and S2, no            murmur, no gallop, no rub, no click   Abdomen:     Soft, non-tender, non-distended, no guarding, no rebound tenderness, Normal bowel sounds in all four quadrant, no masses, liver and spleen nonpalpable,    Genitalia:    Deferred   Extremities:   Moves all extremities well, no edema, no cyanosis, no              Redness, no clubbing   Pulses:   Pulses palpable and equal bilaterally   Skin:   Moist and warm. No bleeding, bruising or rash   Neurologic/Psychiatric:   Alert and oriented to person, place, and time.  Motor, power and tone in upper and lower extremity is grossly intact.  No focal neurological deficits. Normal cognitive function. No psychomotor reaction or tangential thought. No depression, homicidal ideations and suicidal ideations           Lab Review:           Invalid input(s): LABALBU, PROT                                Invalid input(s):  T4,  FREET4    EKG:   ECG/EMG Results (last 24 hours)     ** No results found for the last 24 hours. **          Imaging:  Imaging Results (last 24 hours)     ** No results found for the last 24 hours. **          I personally viewed and interpreted the patient's EKG/Telemetry data.    Assessment:   1.  Chest pain shortness of breath.  Patient at the present time has been recommended to undergo a Lexiscan Cardiolite stress test to rule out evidence of any stress-induced ischemia.  Risk-benefit treatment option for the Lexiscan Cardiolite stress test were discussed with the patient and an informed consent was obtained from the patient for the Lexiscan Cardiolite stress test.  Plan was to proceed with a Lexiscan Cardiolite stress tests.  2.  Arterial hypertension.  Patient blood pressure is 200 100.  Patient has been recommended to continue with the present dose of the Norvasc and the Lotensin.  And the Coreg.  Patient would be started on  hydralazine 24 mg 3 times a day and Isordil 20 mg 3 times a day.  Patient would stop the isosorbide 30 mg and replace it with Isordil.  Patient has been counseled to decrease his salt intake.  3.  Left ventricular systolic dysfunction with an ejection fraction of 20-25%.  Patient is to undergo a Lexiscan Cardiolite stress test to rule out any ischemic component.  Patient would be optimized on maximum pharmacological therapy for the CHF.  Patient will be continued on Coreg Lotensin and hydralazine and Isordil.  Clinically at the present time patient is not in florid congestive heart failure.  4.  Chronic kidney disease with a creatinine of 2.7 is noted.  5.  Obesity.  Patient has been counseled on weight reduction lifestyle modification and dietary restriction.          Plan:         Time: time spent in face-to-face evaluation off greater than 55  minutes and interacting and formulating examining and discussing the plan with the patient with 50% of greater time spent in face-to-face interaction.    Robin Yi MD  07/27/17  4:26 PM      EMR Dragon/Transcription disclaimer:   Some of this note may be an electronic transcription/translation of spoken language to printed text. The electronic translation of spoken language may permit erroneous, or at times, nonsensical words or phrases to be inadvertently transcribed; Although I have reviewed the note for such errors, some may still exist.

## 2017-07-28 ENCOUNTER — HOSPITAL ENCOUNTER (OUTPATIENT)
Dept: NUCLEAR MEDICINE | Facility: HOSPITAL | Age: 52
Discharge: HOME | End: 2017-07-28
Attending: INTERNAL MEDICINE

## 2017-07-28 ENCOUNTER — HOSPITAL ENCOUNTER (OUTPATIENT)
Dept: CARDIOLOGY | Facility: HOSPITAL | Age: 52
Discharge: HOME | End: 2017-07-28
Attending: INTERNAL MEDICINE

## 2017-07-28 DIAGNOSIS — I42.7 DILATED CARDIOMYOPATHY SECONDARY TO DRUG (HCC): ICD-10-CM

## 2017-07-28 DIAGNOSIS — R06.03 ACUTE RESPIRATORY DISTRESS: ICD-10-CM

## 2017-07-28 PROCEDURE — 25010000002 REGADENOSON 0.4 MG/5ML SOLUTION: Performed by: INTERNAL MEDICINE

## 2017-07-28 PROCEDURE — A9500 TC99M SESTAMIBI: HCPCS | Performed by: INTERNAL MEDICINE

## 2017-07-28 PROCEDURE — 0 TECHNETIUM SESTAMIBI: Performed by: INTERNAL MEDICINE

## 2017-07-28 PROCEDURE — 78452 HT MUSCLE IMAGE SPECT MULT: CPT

## 2017-07-28 PROCEDURE — 93017 CV STRESS TEST TRACING ONLY: CPT

## 2017-07-28 RX ADMIN — TECHNETIUM TC-99M SESTAMIBI 1 DOSE: 1 INJECTION INTRAVENOUS at 09:36

## 2017-07-28 RX ADMIN — TECHNETIUM TC-99M SESTAMIBI 1 DOSE: 1 INJECTION INTRAVENOUS at 11:58

## 2017-07-28 RX ADMIN — REGADENOSON 0.4 MG: 0.08 INJECTION, SOLUTION INTRAVENOUS at 11:58

## 2017-08-10 LAB
BH CV NUCLEAR PRIOR STUDY: 3
BH CV STRESS COMMENTS STAGE 1: 10
BH CV STRESS DOSE REGADENOSON STAGE 1: 0.4
BH CV STRESS DURATION MIN STAGE 1: 0
BH CV STRESS DURATION SEC STAGE 1: 10
BH CV STRESS PROTOCOL 1: NORMAL
BH CV STRESS RECOVERY BP: NORMAL MMHG
BH CV STRESS RECOVERY HR: 116 BPM
BH CV STRESS STAGE 1: 1
LV EF NUC BP: 21 %
MAXIMAL PREDICTED HEART RATE: 168 BPM
PERCENT MAX PREDICTED HR: 72.62 %
STRESS BASELINE BP: NORMAL MMHG
STRESS BASELINE HR: 52 BPM
STRESS PERCENT HR: 85 %
STRESS POST ESTIMATED WORKLOAD: 1 METS
STRESS POST PEAK BP: NORMAL MMHG
STRESS POST PEAK HR: 122 BPM
STRESS TARGET HR: 143 BPM

## 2018-08-10 ENCOUNTER — TRANSCRIBE ORDERS (OUTPATIENT)
Dept: PHYSICAL THERAPY | Facility: HOSPITAL | Age: 53
End: 2018-08-10

## 2018-08-10 DIAGNOSIS — E11.42 DIABETIC PERIPHERAL NEUROPATHY (HCC): Primary | ICD-10-CM

## 2018-08-29 ENCOUNTER — HOSPITAL ENCOUNTER (OUTPATIENT)
Dept: PHYSICAL THERAPY | Facility: HOSPITAL | Age: 53
Setting detail: THERAPIES SERIES
Discharge: HOME | End: 2018-08-29

## 2018-08-29 DIAGNOSIS — Z86.39 HISTORY OF DIABETIC NEUROPATHY: Primary | ICD-10-CM

## 2018-08-29 PROCEDURE — 97110 THERAPEUTIC EXERCISES: CPT | Performed by: PHYSICAL THERAPIST

## 2018-08-29 PROCEDURE — G8978 MOBILITY CURRENT STATUS: HCPCS | Performed by: PHYSICAL THERAPIST

## 2018-08-29 PROCEDURE — 97162 PT EVAL MOD COMPLEX 30 MIN: CPT | Performed by: PHYSICAL THERAPIST

## 2018-08-29 PROCEDURE — G8979 MOBILITY GOAL STATUS: HCPCS | Performed by: PHYSICAL THERAPIST

## 2018-08-29 NOTE — THERAPY EVALUATION
Outpatient Physical Therapy Ortho Initial Evaluation  [facility]  Toma Avelar, PT, DPT, CSCS       Patient Name: Jamil Olivo  : 1965  MRN: [id number]  Today's Date: 2018      Visit Date: 2018     Pt reports 3/10 pain pre treatment, 3/10 pain post treatment  Reports N/A% of improvement.  Attended  visits.  Insurance available: Medicare guidelines  Next MD appt: [id number] .  Recertification: 2018.    Patient Active Problem List   Diagnosis   • Respiratory failure (CMS/HCC)   • Diabetes mellitus (CMS/HCC)   • Hypertension   • CKD (chronic kidney disease) stage 4, GFR 15-29 ml/min (CMS/HCC)   • Acute on chronic systolic (congestive) heart failure        Past Medical History:   Diagnosis Date   • CHF (congestive heart failure) (CMS/HCC)    • Diabetes mellitus (CMS/HCC)    • Hypertension         History reviewed. No pertinent surgical history.    Visit Dx:     ICD-10-CM ICD-9-CM   1. History of diabetic neuropathy Z86.39 V12.29     Number of days off work: N/A    Patient is     Patient has grown children.    Medications (Admitted on 2018)     acetaminophen (TYLENOL) 325 MG tablet     albuterol (PROVENTIL HFA;VENTOLIN HFA) 108 (90 BASE) MCG/ACT inhaler     amLODIPine (NORVASC) 10 MG tablet     benazepril (LOTENSIN) 20 MG tablet     carvedilol (COREG) 25 MG tablet     furosemide (LASIX) 40 MG tablet     insulin detemir (LEVEMIR) 100 UNIT/ML injection     isosorbide mononitrate (IMDUR) 60 MG 24 hr tablet      Allergies: Motrin          Patient History     Row Name 18 1000             History    Chief Complaint Burn;Balance Problems;Difficulty Walking  -AJ      Date Current Problem(s) Began --   Chronic, stable  -AJ      Brief Description of Current Complaint Patient reports that the neuropath yin his legs has been hurting and has gotten him off balance some. He reports he has not had any falls, but is worried he will if he doesn't improbe  his balance some.  -AJ      Previous treatment for THIS PROBLEM Medication  -AJ      Patient/Caregiver Goals Improve mobility;Relieve pain  -AJ      Current Tobacco Use None  -AJ      Smoking Status Non-smoker  -AJ      Patient's Rating of General Health Fair  -AJ      Occupation/sports/leisure activities Occupation: unemployed; Hobbies: yard work, landscaping, fishing, swiming  -AJ      Patient seeing anyone else for problem(s)? No, family MD  -AJ      Results of Clinical Tests None for peripheral neuropathy  -AJ      History of Previous Related Injuries None  -AJ         Pain     Pain Location Leg  -AJ      Pain at Present 3  -AJ      Pain at Best 2  -AJ      Pain at Worst 8  -AJ      Pain Frequency Constant/continuous  -AJ      Pain Description Numbness;Shooting;Throbbing  -AJ      What Performance Factors Make the Current Problem(s) WORSE? Sitting a long time  -AJ      What Performance Factors Make the Current Problem(s) BETTER? stretching  -AJ      Tolerance Time- Sitting 1 hour  -AJ      Is your sleep disturbed? No  -AJ      Is medication used to assist with sleep? Yes  -AJ      Difficulties at work? N/A  -AJ      Difficulties with ADL's? None  -AJ      Difficulties with recreational activities? fishing, yard work, walking on uneven ground  -AJ         Fall Risk Assessment    Any falls in the past year: No  -AJ      Number of falls reported in the last 12 months 0  -AJ      Does patient have a fear of falling Yes (comment)   worried will get hurt or have to start using a cane  -AJ        User Key  (r) = Recorded By, (t) = Taken By, (c) = Cosigned By    Initials Name Provider Type    AJ Toma Avelar, PT Physical Therapist                PT Ortho     Row Name 08/29/18 1000       Subjective Comments    Subjective Comments patient wishes to get some of the feeling back and be able to do some of the things he used to do.  -AJ       Precautions and Contraindications    Precautions/Limitations no known  "precautions/limitations  -AJ       Subjective Pain    Able to rate subjective pain? yes  -AJ    Pre-Treatment Pain Level 3  -AJ       Posture/Observations    Posture- WNL Posture is WNL   mild age related changes  -AJ    Posture/Observations Comments No acute distress, fair overall postural awareness.  -AJ       Special Tests/Palpation    Special Tests/Palpation --   No areas of TTP  -AJ       General ROM    GENERAL ROM COMMENTS AROM for B UE/LE/Trunk all WNL  -AJ       MMT (Manual Muscle Testing)    General MMT Comments B LE 4+/5 for all  -AJ       Sensation    Sensation WNL? WFL  -AJ    Light Touch Partial deficits in the RLE;Partial deficits in the LLE  -AJ    Additional Comments patient reports numbness in B feet.  -AJ       Lower Extremity Flexibility    Hamstrings Bilateral:;Mildly limited  -AJ    Gastrocnemius Bilateral:;Mildly limited;Moderately limited  -AJ    Soleus Bilateral:;Mildly limited;Moderately limited  -AJ       Pathomechanics    Pathomechanics Comments No abnormal pathomechanic identified with normal ROM/activity  -AJ       Balance Skills Training    SLS 1-3\" each LE  -AJ    Rhomberg 60 seconds, no sway  -AJ    Sharpened Rhomberg 60 seconds min sway, but safe  -AJ    Ankle Strategy Assessment (Balance) Tandem Stance each LE lead, R lead 3-7\", L 1-3\"  -AJ       Transfers    Comment (Transfers) I with all transfers, B UE A sit to/from Stand  -AJ       Gait/Stairs Assessment/Training    Gait/Stairs Assessment/Training gait/ambulation independence  -AJ    [place] Level (Gait) independent  -AJ    Distance in Feet (Gait) 920  -AJ    Pattern (Gait) step-through  -AJ    Deviations/Abnormal Patterns (Gait) stride length decreased;base of support, wide;ataxic   on/off attaxic  -AJ    Comment (Gait/Stairs) FWB, slightly wider TENNILLE, ataxic when fatigues  -AJ      User Key  (r) = Recorded By, (t) = Taken By, (c) = Cosigned By    Initials Name Provider Type    Toma Quintero, PT Physical " Therapist            Therapy Education  Given: HEP, Posture/body mechanics, Fall prevention and home safety, Mobility training  Program: New  How Provided: Verbal, Demonstration, Written  Provided to: Patient  Level of Understanding: Verbalized, Demonstrated           PT OP Goals     Row Name 08/29/18 1100          PT Short Term Goals    STG Date to Achieve 09/19/18  -     STG 1 [facility] with [facility] and [facility]  -     STG 2 TUG <= 10 seconds safely, no UE A for sit to stand.  -     STG 3 Sit to/from stand x5, 1 UE A, <= 17 seconds.  -     STG 4 Patient able to complete the entire 6 minute walk test covering at least 1000 feet with no rest break.  -     STG 5 Patient able to perform tandem stance each LE lead >= 10 seconds each  -        Long Term Goals    LTG Date to Achieve 10/12/18  -     LTG 1 Patient able to perform 6 minute walk test, no rest breaks, covering at least 1/4 of a mile.  -     LTG 2 Sit to/from stand x5 no UE A <= 16 seconds.  -     LTG 3 SLS each LE >= 10 seconds EO, level ground.  -     LTG 4 Able to tandem stance each LE lead for 15 seconds each leg.  -     LTG 5 Able to ascend/descend 3 steps reciprocally x5, HRA for balance x1 with no increase in pain.  -     LTG 6 B LE 5/5.  -     LTG 7 I with final HEP.  -     LTG 8 D/C with a final HE pand free 30 day fitness formula memebership.  -        Time Calculation    PT Goal Re-Cert Due Date 09/19/18  -       User Key  (r) = Recorded By, (t) = Taken By, (c) = Cosigned By    Initials Name Provider Type    Toma Quintero, PT Physical Therapist         Barriers to Rehab:Include significant or possible arthritic/degenerative changes that have occurred within the joints/spine/nerves, The patient's obesity, The patient's generally deconditioned state.    Safety Issues: None noted.            PT Assessment/Plan     Row Name 08/29/18 1100          PT Assessment    Functional  Limitations Impaired gait;Impaired locomotion;Limitation in home management;Limitations in community activities;Performance in leisure activities  -     Impairments Balance;Endurance;Gait;Impaired flexibility;Impaired muscle endurance;Impaired muscle length;Impaired muscle power;Sensation;Pain;Muscle strength  -     Assessment Comments Patient has overall decrease in strength, balance and endurance. Did well but fatigued wiuth todays ther ex.  -     Rehab Potential Fair  -AJ     Patient/caregiver participated in establishment of treatment plan and goals Yes  -AJ     Patient would benefit from skilled therapy intervention Yes  -AJ        PT Plan    PT Frequency 2x/week  -AJ     Predicted Duration of Therapy Intervention (Therapy Eval) 4-6 weeks, 8-12 visits  -AJ     Planned CPT's? PT EVAL MOD COMPLELITY: 46101;PT RE-EVAL: 46990;PT THER PROC EA 15 MIN: 70073;PT THER ACT EA 15 MIN: 63748;PT GAIT TRAINING EA 15 MIN: 55373;PT THER SUPP EA 15 MIN  -     Physical Therapy Interventions (Optional Details) balance training;gait training;gross motor skills;home exercise program;patient/family education;postural re-education;stair training;strengthening;stretching  -     PT Plan Comments Progress overall strength, endurance, and balance  -       User Key  (r) = Recorded By, (t) = Taken By, (c) = Cosigned By    Initials Name Provider Type    Toma Quintero, PT Physical Therapist       Other therapeutic activities and/or exercises will be prescribed depending on the patients progress or lack there of.            Exercises     Row Name 08/29/18 1000             Subjective Comments    Subjective Comments patient wishes to get some of the feeling back and be able to do some of the things he used to do.  -AJ         Subjective Pain    Able to rate subjective pain? yes  -AJ      Pre-Treatment Pain Level 3  -AJ      Post-Treatment Pain Level 3  -AJ         Exercise 1    Exercise Name 1 Seated alt marching  -AJ       Reps 1 2  -      Time 1 30 seconds  -         Exercise 2    Exercise Name 2 Seated toe taps  -      Reps 2 2  -AJ      Time 2 30 seconds  -         Exercise 3    Exercise Name 3 Sit to/from Stand  -      Sets 3 2  -AJ      Reps 3 10  -AJ      Additional Comments B UVALENTINE A  -         Exercise 4    Exercise Name 4 St heel raises  -      Sets 4 2  -AJ      Reps 4 10  -AJ        User Key  (r) = Recorded By, (t) = Taken By, (c) = Cosigned By    Initials Name Provider Type    Toma Quintero, PT Physical Therapist                        Outcome Measure Options: Lower Extremity Functional Scale (LEFS), 5x Sit to Stand, 6 Minute Walk Test, Timed Up and Go (TUG)  5 Times Sit to Stand  5 Times Sit to Stand (seconds): 17 seconds  5 Times Sit to Stand Comments: B UE A sit to/from stand  6 Minute Walk Test  Distance: 920  Supplemental O2 Used?: No  # of Rest Breaks: 1  6 Minute Walk Comments: Walked 3 min 20 seconds, then rested 90 seconds, finished last 70 seconds.  Lower Extremity Functional Index  Any of your usual work, housework or school activities: No difficulty  Your usual hobbies, recreational or sporting activities: Moderate difficulty  Getting into or out of the bath: A little bit of difficulty  Walking between rooms: No difficulty  Putting on your shoes or socks: Quite a bit of difficulty  Squatting: Moderate difficulty  Lifting an object, like a bag of groceries from the floor: A little bit of difficulty  Performing light activities around your home: Moderate difficulty  Performing heavy activities around your home: Moderate difficulty  Getting into or out of a car: No difficulty  Walking 2 blocks: A little bit of difficulty  Walking a mile: Quite a bit of difficulty  Going up or down 10 stairs (about 1 flight of stairs): Moderate difficulty  Standing for 1 hour: Moderate difficulty  Sitting for 1 hour: Moderate difficulty  Running on even ground: A little bit of difficulty  Running on uneven  ground: Moderate difficulty  Making sharp turns while running fast: Quite a bit of difficulty  Hopping: Quite a bit of difficulty  Rolling over in bed: No difficulty  Total: 48  Timed Up and Go (TUG)  TUG Test 1: 12 seconds  TUG Test 2: 12 seconds  Timed Up and Go Comments: B UE A with sit to/from stand, some ataxia with turn      Time Calculation:   Start Time: 1015  Stop Time: 1107  Time Calculation (min): 52 min  Total Timed Code Minutes- PT: 25 minute(s)     Therapy Charges for Today     Code Description Service Date Service Provider Modifiers Qty    15640474200 HC PT MOBILITY CURRENT 8/29/2018 Toma Avelar, PT GP, CK 1    66064881548 HC PT MOBILITY PROJECTED 8/29/2018 Toma Avelar, PT GP, CJ 1    75401528358 HC PT EVAL MOD COMPLEXITY 2 8/29/2018 Toma Avelar, PT GP 1    56222124689 HC PT THER PROC EA 15 MIN 8/29/2018 Toma Avelar, PT GP 2    05500766419 HC PT THER SUPP EA 15 MIN 8/29/2018 Toma Avelar, PT GP 1          PT G-Codes  Outcome Measure Options: Lower Extremity Functional Scale (LEFS), 5x Sit to Stand, 6 Minute Walk Test, Timed Up and Go (TUG)  Functional Limitation: Mobility: Walking and moving around  Mobility: Walking and Moving Around Current Status (): At least 40 percent but less than 60 percent impaired, limited or restricted  Mobility: Walking and Moving Around Goal Status (): At least 20 percent but less than 40 percent impaired, limited or restricted         Toma Avelar, PT, DPT, CSCS  8/29/2018

## 2018-08-30 ENCOUNTER — APPOINTMENT (OUTPATIENT)
Dept: PHYSICAL THERAPY | Facility: HOSPITAL | Age: 53
End: 2018-08-30

## 2018-09-05 ENCOUNTER — HOSPITAL ENCOUNTER (OUTPATIENT)
Dept: PHYSICAL THERAPY | Facility: HOSPITAL | Age: 53
Setting detail: THERAPIES SERIES
Discharge: HOME | End: 2018-09-05

## 2018-09-05 DIAGNOSIS — Z86.39 HISTORY OF DIABETIC NEUROPATHY: Primary | ICD-10-CM

## 2018-09-05 PROCEDURE — 97110 THERAPEUTIC EXERCISES: CPT

## 2018-09-05 NOTE — THERAPY TREATMENT NOTE
Outpatient Physical Therapy Ortho Treatment Note  [facility]     Patient Name: Jamil Olivo  : 1965  MRN: [id number]  Today's Date: 2018      Visit Date: 2018  Pt reports 7/10 pain pre treatment, 3/10 pain post treatment  Reports 0 % of improvement.  Attended 2/2 visits.  Insurance available:Medicare guidelines   Next MD appt: OCt [id number].  Recertification: 2018.Visit Dx:    ICD-10-CM ICD-9-CM   1. History of diabetic neuropathy Z86.39 V12.29       Patient Active Problem List   Diagnosis   • Respiratory failure (CMS/[facility])   • Diabetes mellitus (CMS/[facility])   • Hypertension   • CKD (chronic kidney disease) stage 4, GFR 15-29 ml/min (CMS/[facility])   • Acute on chronic systolic (congestive) heart failure        Past Medical History:   Diagnosis Date   • CHF (congestive heart failure) (CMS/[facility])    • Diabetes mellitus (CMS/[facility])    • Hypertension         No past surgical history on file.          PT Ortho     Row Name 18 0800       Precautions and Contraindications    Precautions/Limitations no known precautions/limitations  -TL       Subjective Pain    Able to rate subjective pain? yes  -TL    Pre-Treatment Pain Level 7  -TL    Post-Treatment Pain Level 3  -TL       Posture/Observations    Posture/Observations Comments Pt gets SOA easily  -TL      User Key  (r) = Recorded By, (t) = Taken By, (c) = Cosigned By    Initials Name Provider Type    TL Becky Herrera PTA Physical Therapy Assistant                            PT Assessment/Plan     Row Name 18 0900          PT Assessment    Assessment Comments pt gets SOA easily and required frequent rest break. PTA educated pt on purse lip breathing with ex and pace self. Pt working toward goals. No goals met at this time. pt tolerated new ex of GTB ham curls and hip abd.   pt's pain decrease with ex per patient report  -TL        PT Plan    PT Frequency 2x/week  -TL     PT Plan Comments balance activities next visit.   -TL       User Key  (r) = Recorded By, (t) = Taken By, (c) = Cosigned By    Initials Name Provider Type    Becky Villafana PTA Physical Therapy Assistant                    Exercises     Row Name 09/05/18 0800             Subjective Comments    Subjective Comments pt stated that his knees are hurting  -TL         Subjective Pain    Able to rate subjective pain? yes  -TL      Pre-Treatment Pain Level 7  -TL      Post-Treatment Pain Level 3  -TL         Exercise 1    Exercise Name 1 Pro ll legs /UE  -TL      Time 1 10 mins  -TL      Additional Comments level 4.5  -TL         Exercise 2    Exercise Name 2 sit to stands  -TL      Sets 2 2  -TL      Reps 2 10  -TL      Additional Comments One hand support  -TL         Exercise 3    Exercise Name 3 purse lip breathing ex with ex  -TL      Time 3 5 mins  -TL      Additional Comments education  -TL         Exercise 4    Exercise Name 4 st. marching  -TL      Sets 4 2  -TL      Reps 4 10  -TL      Additional Comments pt counting out loud  -TL         Exercise 5    Exercise Name 5 st heelraises  -TL      Sets 5 2  -TL      Reps 5 10  -TL         Exercise 6    Exercise Name 6 seated ham curls  -TL      Sets 6 2  -TL      Reps 6 10  -TL      Additional Comments GTB  -TL         Exercise 7    Exercise Name 7 seated hip abd GTB  -TL      Sets 7 2  -TL      Reps 7 10  -TL        User Key  (r) = Recorded By, (t) = Taken By, (c) = Cosigned By    Initials Name Provider Type    Becky Villafana PTA Physical Therapy Assistant                               PT OP Goals     Row Name 09/05/18 0800          PT Short Term Goals    STG Date to Achieve 09/19/18  -TL     STG 1 I with HEP and [facility]  -TL     STG 1 Progress Ongoing  -TL     STG 2 TUG <= 10 seconds safely, no UE A for sit to stand.  -TL     STG 2 Progress Ongoing  -TL     STG 3 Sit to/from stand x5, 1 UE A, <= 17 seconds.  -TL     STG 3 Progress Ongoing  -TL     STG 4 Patient able  to complete the entire 6 minute walk test covering at least 1000 feet with no rest break.  -TL     STG 4 Progress Ongoing  -TL     STG 5 Patient able to perform tandem stance each LE lead >= 10 seconds each  -TL     STG 5 Progress Ongoing  -TL        Long Term Goals    LTG Date to Achieve 10/12/18  -TL     LTG 1 Patient able to perform 6 minute walk test, no rest breaks, covering at least 1/4 of a mile.  -TL     LTG 2 Sit to/from stand x5 no UE A <= 16 seconds.  -TL     LTG 3 SLS each LE >= 10 seconds EO, level ground.  -TL     LTG 4 Able to tandem stance each LE lead for 15 seconds each leg.  -TL     LTG 5 Able to ascend/descend 3 steps reciprocally x5, HRA for balance x1 with no increase in pain.  -TL     LTG 6 B LE 5/5.  -TL     LTG 7 I with final HEP.  -TL     LTG 8 D/C with a final HE pand free 30 day fitness formula memebership.  -TL        Time Calculation    PT Goal Re-Cert Due Date 09/19/18  -TL       User Key  (r) = Recorded By, (t) = Taken By, (c) = Cosigned By    Initials Name Provider Type    TL Becky Herrera PTA Physical Therapy Assistant          Therapy Education  Education Details: seated ham curls GTB, hip abd GTB, sit to stands (purse lip breathing with ex, pace self with ex)  Given: HEP, Symptoms/condition management  Program: Reinforced, Progressed  How Provided: Verbal, Demonstration, Written  Provided to: Patient  Level of Understanding: Verbalized, Demonstrated              Time Calculation:   Start Time: 0853  Stop Time: 0940  Time Calculation (min): 47 min  Total Timed Code Minutes- PT: 47 minute(s)  Therapy Suggested Charges     Code   Minutes Charges    None           Therapy Charges for Today     Code Description Service Date Service Provider Modifiers Qty    70398032767 HC PT THER PROC EA 15 MIN 9/5/2018 Becky Herrera PTA GP 3                    Becky Herrera PTA  9/5/2018

## 2018-09-06 ENCOUNTER — HOSPITAL ENCOUNTER (OUTPATIENT)
Dept: PHYSICAL THERAPY | Facility: HOSPITAL | Age: 53
Setting detail: THERAPIES SERIES
Discharge: HOME | End: 2018-09-06

## 2018-09-06 DIAGNOSIS — Z86.39 HISTORY OF DIABETIC NEUROPATHY: Primary | ICD-10-CM

## 2018-09-06 PROCEDURE — 97110 THERAPEUTIC EXERCISES: CPT

## 2018-09-06 NOTE — THERAPY TREATMENT NOTE
Outpatient Physical Therapy Ortho Treatment Note  [facility]  Saritha Minor PTA       Patient Name: Jamil Olivo  : 1965  MRN: [id number]  Today's Date: 2018      Visit Date: 2018     Visits: 3/3  Insurance Visits Approved: based on medicare guidelines  Recert Due: 2018  MD Appt: Oct 17th 2018  Pain: pretreatment 0/10; post treatment 0/10  Improvement: pt is subjectively reporting 0% improvement since initial evaluation    Visit Dx:    ICD-10-CM ICD-9-CM   1. History of diabetic neuropathy Z86.39 V12.29       Patient Active Problem List   Diagnosis   • Respiratory failure (CMS/[facility])   • Diabetes mellitus (CMS/[facility])   • Hypertension   • CKD (chronic kidney disease) stage 4, GFR 15-29 ml/min (CMS/[facility])   • Acute on chronic systolic (congestive) heart failure        Past Medical History:   Diagnosis Date   • Asthma    • CHF (congestive heart failure) (CMS/[facility])    • Diabetes mellitus (CMS/[facility])    • Diabetic neuropathy (CMS/[facility])    • Hypertension    • Kidney disease     pt states that he is close to needing to be on dialysis        Past Surgical History:   Procedure Laterality Date   • EYE SURGERY Bilateral 2017             PT Ortho     Row Name 18 1100       Precautions and Contraindications    Precautions/Limitations no known precautions/limitations  -MH       Subjective Pain    Able to rate subjective pain? yes  -    Pre-Treatment Pain Level 0  -       Posture/Observations    Posture/Observations Comments SOA upon entering facility. shoes unties and patient struggles to tie shoes with getting SOA. requires standing rest before getting on bicycle. pt instructed in pursed lip breathing for bicycle. post Pro II O2 SATS 92% with HR 112bpm. pt instructed to rest and purse lip breath.   -    Row Name 18 0800       Precautions and Contraindications    Precautions/Limitations no known precautions/limitations  -TL       Subjective Pain    Able  to rate subjective pain? yes  -TL    Pre-Treatment Pain Level 7  -TL    Post-Treatment Pain Level 3  -TL       Posture/Observations    Posture/Observations Comments Pt gets SOA easily  -      User Key  (r) = Recorded By, (t) = Taken By, (c) = Cosigned By    Initials Name Provider Type     Saritha Minor, SHAYY Physical Therapy Assistant    TL Becky Herrera, SHAYY Physical Therapy Assistant                            PT Assessment/Plan     Row Name 09/06/18 1100          PT Assessment    Assessment Comments cues for pursed lip breathing throughout treatment. patient continues to get short of breath during treatment and requires rest breaks. prefers to rest standing secondary to feeling as if he can breath better in that position. patient challenged with balance activities and requires UE assist. with walking activities patient has drop in O2 SATS. recovers with pursed lip breathing and resting  -        PT Plan    PT Frequency 2x/week  -     PT Plan Comments next visit continue balance activities. may initiate stance on airx with WBOS  -       User Key  (r) = Recorded By, (t) = Taken By, (c) = Cosigned By    Initials Name Provider Type     Saritha Minor, SHAYY Physical Therapy Assistant                Modalities     Row Name 09/06/18 1100             Subjective Comments    Subjective Comments reports that he feels about the same. no pain, but just short of breath and tired.   -         Subjective Pain    Post-Treatment Pain Level 0  -        User Key  (r) = Recorded By, (t) = Taken By, (c) = Cosigned By    Initials Name Provider Type     Saritha Minor PTA Physical Therapy Assistant                Exercises     Row Name 09/06/18 1100             Subjective Comments    Subjective Comments reports that he feels about the same. no pain, but just short of breath and tired.   -         Subjective Pain    Able to rate subjective pain? yes  -      Pre-Treatment Pain Level 0  -      Post-Treatment Pain  Level 0  -         Exercise 1    Exercise Name 1 Pro II UE/LE  -      Time 1 10 minutes  -      Additional Comments L 4.0  -         Exercise 2    Exercise Name 2 instruction in purse lip breathing  -         Exercise 3    Exercise Name 3 Sit to/from Stand  -      Sets 3 2  -MH      Reps 3 10  -MH         Exercise 4    Exercise Name 4 Standing [facility] Taps  -      Time 4 1 minute  -      Additional Comments pt counting out loud  -         Exercise 5    Exercise Name 5 St. Hip ABD  -      Reps 5 10  -      Additional Comments pt counting out loud  -         Exercise 6    Exercise Name 6 St. Hip Ext  -      Reps 6 10   -MH      Additional Comments pt counting out loud  -         Exercise 7    Exercise Name 7 Standing Marching  -      Reps 7 2  -MH      Time 7 30 sec hold  -         Exercise 8    Exercise Name 8 Tandem Stance B Lead  -      Reps 8 2  -MH      Time 8 30 sec hold  -      Additional Comments UE Assist, clincian CGA  -         Exercise 9    Exercise Name 9 ambulation  -      Reps 9 400 feet  -      Additional Comments patient's O2 prior to walking 95% with HR 87; post walking O2 87% and HR 111bpm. pt stands and focuses on pursed lip breathing. takes 2 minutes to get O2 up to 90% and HR down to 70 bmp and another minute for O2 to increase to 93% and HR down to 52bpm  -        User Key  (r) = Recorded By, (t) = Taken By, (c) = Cosigned By    Initials Name Provider Type    Saritha Anderson, PTA Physical Therapy Assistant                               PT OP Goals     Row Name 09/06/18 1100          PT Short Term Goals    STG Date to Achieve 09/19/18  -     STG 1 I with HEP and have additions/changes by next recertificaiton.  -     STG 1 Progress Ongoing  -     STG 2 TUG <= 10 seconds safely, no UE A for sit to stand.  -     STG 2 Progress Ongoing  -     STG 3 Sit to/from stand x5, 1 UE A, <= 17 seconds.  -     STG 3 Progress Ongoing  -     STG 4 Patient  able to complete the entire 6 minute walk test covering at least 1000 feet with no rest break.  -     STG 4 Progress Ongoing  -     STG 5 Patient able to perform tandem stance each LE lead >= 10 seconds each  -     STG 5 Progress Ongoing  -        Long Term Goals    LTG Date to Achieve 10/12/18  -     LTG 1 Patient able to perform 6 minute walk test, no rest breaks, covering at least 1/4 of a mile.  -     LTG 2 Sit to/from stand x5 no UE A <= 16 seconds.  -     LTG 3 SLS each LE >= 10 seconds EO, level ground.  -     LTG 4 Able to tandem stance each LE lead for 15 seconds each leg.  -     LTG 5 Able to ascend/descend 3 steps reciprocally x5, HRA for balance x1 with no increase in pain.  -     LTG 6 B LE 5/5.  -     LTG 7 I with final HEP.  -     LTG 8 D/C with a final HE pand free 30 day fitness formula memebership.  -        Time Calculation    PT Goal Re-Cert Due Date 09/19/18  -       User Key  (r) = Recorded By, (t) = Taken By, (c) = Cosigned By    Initials Name Provider Type     Saritha Minor PTA Physical Therapy Assistant          Therapy Education  Given: HEP, Symptoms/condition management  Program: Reinforced  How Provided: Verbal, Demonstration  Provided to: Patient  Level of Understanding: Verbalized, Demonstrated              Time Calculation:   Start Time: 1112  Stop Time: 1202  Time Calculation (min): 50 min  Total Timed Code Minutes- PT: 50 minute(s)    Therapy Charges for Today     Code Description Service Date Service Provider Modifiers Qty    19808076092 HC PT THER PROC EA 15 MIN 9/6/2018 Saritha Minor PTA GP 3    53129368906 HC PT THER SUPP EA 15 MIN 9/6/2018 Saritha Minor PTA GP 1                    Saritha Minor PTA  9/6/2018

## 2018-09-12 ENCOUNTER — APPOINTMENT (OUTPATIENT)
Dept: PHYSICAL THERAPY | Facility: HOSPITAL | Age: 53
End: 2018-09-12

## 2018-09-14 ENCOUNTER — HOSPITAL ENCOUNTER (OUTPATIENT)
Dept: PHYSICAL THERAPY | Facility: HOSPITAL | Age: 53
Setting detail: THERAPIES SERIES
Discharge: HOME | End: 2018-09-14

## 2018-09-14 DIAGNOSIS — Z86.39 HISTORY OF DIABETIC NEUROPATHY: Primary | ICD-10-CM

## 2018-09-14 PROCEDURE — 97110 THERAPEUTIC EXERCISES: CPT

## 2018-09-14 NOTE — THERAPY TREATMENT NOTE
Outpatient Physical Therapy Ortho Treatment Note  [facility]     Patient Name: Jamil Olivo  : 1965  MRN: [id number]  Today's Date: 2018      Visit Date: 2018  Pt reports 0/10 pain pre treatment, 0/10 pain post treatment  Reports some% of improvement.  Attended 4/5 visits.  Insurance available:Medicare guidelines   Next MD appt: Oct 17 2018.  Recertification: 2018.  Visit Dx:    ICD-10-CM ICD-9-CM   1. History of diabetic neuropathy Z86.39 V12.29       Patient Active Problem List   Diagnosis   • Respiratory failure (CMS/[facility])   • Diabetes mellitus (CMS/[facility])   • Hypertension   • CKD (chronic kidney disease) stage 4, GFR 15-29 ml/min (CMS/[facility])   • Acute on chronic systolic (congestive) heart failure        Past Medical History:   Diagnosis Date   • Asthma    • CHF (congestive heart failure) (CMS/[facility])    • Diabetes mellitus (CMS/[facility])    • Diabetic neuropathy (CMS/[facility])    • Hypertension    • Kidney disease     pt states that he is close to needing to be on dialysis        Past Surgical History:   Procedure Laterality Date   • EYE SURGERY Bilateral 2017             PT Ortho     Row Name 18 1100       Precautions and Contraindications    Precautions/Limitations no known precautions/limitations  -TL       Subjective Pain    Able to rate subjective pain? yes  -TL    Pre-Treatment Pain Level 0  -TL    Post-Treatment Pain Level 0  -TL      User Key  (r) = Recorded By, (t) = Taken By, (c) = Cosigned By    Initials Name Provider Type    TL Becky Herrera PTA Physical Therapy Assistant                            PT Assessment/Plan     Row Name 18 1100          PT Assessment    Assessment Comments pt met short term goals 2 and 3 goals. pt progressing toward 6min walk up to 1000 feet. pt made 890 feet in 3min50 sec  -TL        PT Plan    PT Frequency 2x/week  -TL     PT Plan Comments continue to work on 6min walk  -TL       User Key  (r) = Recorded  By, (t) = Taken By, (c) = Cosigned By    Initials Name Provider Type    TL Becky Herrera PTA Physical Therapy Assistant                    Exercises     Row Name 09/14/18 1100             Subjective Pain    Able to rate subjective pain? yes  -TL      Pre-Treatment Pain Level 0  -TL      Post-Treatment Pain Level 0  -TL         Exercise 1    Exercise Name 1 Pro II UE/LE  -TL      Time 1 10 minutes  -TL      Additional Comments level 4  -TL         Exercise 2    Exercise Name 2 airex eyes closed  -TL      Time 2 1 min  -TL      Additional Comments small sway  -TL         Exercise 3    Exercise Name 3 airex marching  -TL      Reps 3 20  -TL         Exercise 4    Exercise Name 4 airex HR/TR  -TL      Reps 4 20  -TL         Exercise 5    Exercise Name 5 Tug test  -TL      Sets 5 2  -TL      Reps 5 12 sec , 10 sec  -TL      Additional Comments no hands sit to stands  -TL         Exercise 6    Exercise Name 6 sit to stands one hand support  -TL      Reps 6 5  -TL      Additional Comments sec 14 sec  -TL         Exercise 7    Exercise Name 7 SLS  -TL      Reps 7 4  -TL      Additional Comments both legs  -TL         Exercise 8    Exercise Name 8 6 min gym walk  -TL      Reps 8 890 feet  -TL      Time 8 3 min 50 sec   -TL      Additional Comments pt with SOA , O2 decrease to 88% and heart rate increase 115bpm then dropped after rebounding 52 then back up to 95bpm  -TL        User Key  (r) = Recorded By, (t) = Taken By, (c) = Cosigned By    Initials Name Provider Type    TL Becky Herrera PTA Physical Therapy Assistant                               PT OP Goals     Row Name 09/14/18 1100          PT Short Term Goals    STG Date to Achieve 09/19/18  -TL     STG 1 I with HEP and have additions/changes by next [facility].  -TL     STG 1 Progress Ongoing  -TL     STG 2 TUG <= 10 seconds safely, no UE A for sit to stand.  -TL     STG 2 Progress Met  -TL     STG 2 Progress Comments 2 reps 1st 12 sec, second trial 10 sec   -TL     STG 3 Sit to/from stand x5, 1 UE A, <= 17 seconds.  -TL     STG 3 Progress Met  -TL     STG 3 Progress Comments 14 sec single hand support  -TL     STG 4 Patient able to complete the entire 6 minute walk test covering at least 1000 feet with no rest break.  -TL     STG 4 Progress Ongoing  -TL     STG 5 Patient able to perform tandem stance each LE lead >= 10 seconds each  -TL     STG 5 Progress Ongoing  -TL        Long Term Goals    LTG Date to Achieve 10/12/18  -TL     LTG 1 Patient able to perform 6 minute walk test, no rest breaks, covering at least 1/4 of a mile.  -TL     LTG 1 Progress Ongoing  -TL     LTG 2 Sit to/from stand x5 no UE A <= 16 seconds.  -TL     LTG 2 Progress Ongoing  -TL     LTG 3 SLS each LE >= 10 seconds EO, level ground.  -TL     LTG 3 Progress Ongoing  -TL     LTG 4 Able to tandem stance each LE lead for 15 seconds each leg.  -TL     LTG 4 Progress Ongoing  -TL     LTG 5 Able to ascend/descend 3 steps reciprocally x5, HRA for balance x1 with no increase in pain.  -TL     LTG 5 Progress Ongoing  -TL     LTG 6 B LE 5/5.  -TL     LTG 6 Progress Ongoing  -TL     LTG 7 I with final HEP.  -TL     LTG 7 Progress Ongoing  -TL     LTG 8 D/C with a final HE pand free 30 day fitness formula memebership.  -TL     LTG 8 Progress Ongoing  -TL       User Key  (r) = Recorded By, (t) = Taken By, (c) = Cosigned By    Initials Name Provider Type    Becky Villafana PTA Physical Therapy Assistant          Therapy Education  Given: HEP, Symptoms/condition management, Posture/body mechanics  Program: Reinforced  How Provided: Verbal, Demonstration  Provided to: Patient  Level of Understanding: Verbalized, Demonstrated              Time Calculation:   Start Time: 1110  Stop Time: 1158  Time Calculation (min): 48 min  Total Timed Code Minutes- PT: 48 minute(s)  Therapy Suggested Charges     Code   Minutes Charges    None           Therapy Charges for Today     Code Description Service Date Service  Provider Modifiers Qty    [id number]  PT THER PROC EA 15 MIN 9/14/2018 Becky Herrera, PTA GP 3                    Becky Herrera PTA  9/14/2018

## 2018-09-19 ENCOUNTER — APPOINTMENT (OUTPATIENT)
Dept: PHYSICAL THERAPY | Facility: HOSPITAL | Age: 53
End: 2018-09-19

## 2018-09-26 ENCOUNTER — HOSPITAL ENCOUNTER (OUTPATIENT)
Dept: PHYSICAL THERAPY | Facility: HOSPITAL | Age: 53
Setting detail: THERAPIES SERIES
Discharge: HOME | End: 2018-09-26

## 2018-09-26 DIAGNOSIS — Z86.39 HISTORY OF DIABETIC NEUROPATHY: Primary | ICD-10-CM

## 2018-09-26 PROCEDURE — G8978 MOBILITY CURRENT STATUS: HCPCS | Performed by: PHYSICAL THERAPIST

## 2018-09-26 PROCEDURE — G8979 MOBILITY GOAL STATUS: HCPCS | Performed by: PHYSICAL THERAPIST

## 2018-09-26 PROCEDURE — 97110 THERAPEUTIC EXERCISES: CPT | Performed by: PHYSICAL THERAPIST

## 2018-09-26 NOTE — THERAPY PROGRESS REPORT/RE-CERT
Outpatient Physical Therapy Ortho Progress Note  [facility]  Toma Avelar, PT, DPT, CSCS       Patient Name: Jamil Olivo  : 1965  MRN: [id number]  Today's Date: 2018      Visit Date: 2018     Pt reports 0/10 pain pre treatment, 0/10 pain post treatment  Reports 50% of improvement.  Attended 5/7 visits.  Insurance available: medicare guidelines  Next MD appt: 10/17/2018.  Recertification: 10/17/2018.    Visit Dx:    ICD-10-CM ICD-9-CM   1. History of diabetic neuropathy Z86.39 V12.29       Patient Active Problem List   Diagnosis   • Respiratory failure (CMS/Formerly [facility])   • Diabetes mellitus (CMS/Formerly [facility])   • Hypertension   • CKD (chronic kidney disease) stage 4, GFR 15-29 ml/min (CMS/Formerly [facility])   • Acute on chronic systolic (congestive) heart failure        Past Medical History:   Diagnosis Date   • Asthma    • CHF (congestive heart failure) (CMS/Formerly [facility])    • Diabetes mellitus (CMS/Formerly [facility])    • Diabetic neuropathy (CMS/Formerly [facility])    • Hypertension    • Kidney disease     pt states that he is close to needing to be on dialysis        Past Surgical History:   Procedure Laterality Date   • EYE SURGERY Bilateral 2017     Number of days off work: N/A    Changes to medications: None noted.    Changes to MD orders: None noted.          PT Ortho     Row Name 18 1000       Subjective Comments    Subjective Comments Patient reports it is kida a blah day due to the weather but reports he is doing okay.  -AJ       Precautions and Contraindications    Precautions/Limitations no known precautions/limitations  -       Subjective Pain    Able to rate subjective pain? yes  -       Posture/Observations    Posture- WNL Posture is WNL   mild age related changes  -    Posture/Observations Comments No acute distress, fair overall postural awareness.  -       General ROM    GENERAL ROM COMMENTS AROM for B UE/LE/Trunk all WNL  -AJ       MMT (Manual Muscle Testing)    General MMT  "Comments B hips 4+/5, B QS/HS 5/5 B DF/PF 5/5  -AJ       Sensation    Sensation WNL? WFL  -AJ    Light Touch Partial deficits in the RLE;Partial deficits in the LLE  -AJ    Additional Comments patient reports numbness in B feet.  -AJ       Lower Extremity Flexibility    Hamstrings Bilateral:;Mildly limited  -AJ    Gastrocnemius Bilateral:;Mildly limited  -AJ    Soleus Bilateral:;Mildly limited  -AJ       Pathomechanics    Pathomechanics Comments No abnormal pathomechanic identified with normal ROM/activity  -AJ       Balance Skills Training    SLS R 1-3\", L 6-7\" each LE  -AJ    Ankle Strategy Assessment (Balance) Tandem Stance R LE lead 11 seconds, L LE lead 3-4\"  -AJ       Transfers    Comment (Transfers) I with all transfers, B UE A sit to/from Stand  -AJ       Gait/Stairs Assessment/Training    Gait/Stairs Assessment/Training gait/ambulation independence  -AJ    Heard Level (Gait) independent  -AJ    Distance in Feet (Gait) 1295  -AJ    Pattern (Gait) step-through  -AJ    Deviations/Abnormal Patterns (Gait) base of support, wide  -AJ    Comment (Gait/Stairs) FWB, slightly wider TENNILLE, no ataxia noted.  -AJ      User Key  (r) = Recorded By, (t) = Taken By, (c) = Cosigned By    Initials Name Provider Type    AJ Toma Avelar, PT Physical Therapist           Barriers to Rehab: Include significant or possible arthritic/degenerative changes that have occurred within the joint/spine    Safety Issues: None noted.            PT Assessment/Plan     Row Name 09/26/18 1000          PT Assessment    Functional Limitations Impaired gait;Impaired locomotion;Limitation in home management;Limitations in community activities;Performance in leisure activities  -AJ     Impairments Balance;Endurance;Gait;Impaired flexibility;Impaired muscle endurance;Impaired muscle length;Impaired muscle power;Sensation;Pain;Muscle strength  -AJ     Assessment Comments Patient has improving balance and overall endurance, still lacking " some hip strength overall.  -     Rehab Potential Fair  -AJ     Patient/caregiver participated in establishment of treatment plan and goals Yes  -AJ     Patient would benefit from skilled therapy intervention Yes  -AJ        PT Plan    PT Frequency 2x/week   1L/1P  -AJ     Predicted Duration of Therapy Intervention (Therapy Eval) 2-4 more weeks, 4-8 more visits  -AJ     Planned CPT's? PT RE-EVAL: 30243;PT THER PROC EA 15 MIN: 09409;PT THER ACT EA 15 MIN: 64216;PT GAIT TRAINING EA 15 MIN: 36941;PT THER SUPP EA 15 MIN  -AJ     Physical Therapy Interventions (Optional Details) balance training;gait training;gross motor skills;home exercise program;manual therapy techniques;modalities;patient/family education;postural re-education;ROM (Range of Motion);stair training;strengthening;stretching  -     PT Plan Comments progress overall balance, endurance, and strength. Add aquatics 1 time each week, monitor pulse ox. Split walking up with ther ex.  -       User Key  (r) = Recorded By, (t) = Taken By, (c) = Cosigned By    Initials Name Provider Type    Toma Quintero, PT Physical Therapist       Other therapeutic activities and/or exercises will be prescribed depending on the patients progress or lack there of.              Exercises     Row Name 09/26/18 1000             Subjective Comments    Subjective Comments Patient reports it is kida a blah day due to the weather but reports he is doing okay.  -         Subjective Pain    Able to rate subjective pain? yes  -AJ      Pre-Treatment Pain Level 0  -      Post-Treatment Pain Level 0  -         Exercise 1    Exercise Name 1 Pro II UE/LE  -      Time 1 10 minutes  -      Additional Comments L 4.0  -         Exercise 2    Exercise Name 2 Sit to/from Stand  -      Sets 2 2  -AJ      Reps 2 5  -         Exercise 3    Exercise Name 3 TUG  -      Reps 3 2  -         Exercise 4    Exercise Name 4 Tandem stance  -      Reps 4 3 each LE lead  -AJ          Exercise 5    Exercise Name 5 SLS  -      Sets 5 3 each LE  -         Exercise 6    Exercise Name 6 6 minute walk test  -      Additional Comments O2 stayed above 92%, some SOA noted, but able to complete.  -         Exercise 7    Exercise Name 7 3 reciprocal steps  -      Reps 7 5  -         Exercise 8    Exercise Name 8 Shuttle: 2L Press  -      Time 8 5 minutes  -      Additional Comments 7 cords  -         Exercise 9    Exercise Name 9 Shuttle: 1L Press each  -      Time 9 3 minutes each LE  -AJ      Additional Comments 7 cords  -        User Key  (r) = Recorded By, (t) = Taken By, (c) = Cosigned By    Initials Name Provider Type    Toma Quintero, PT Physical Therapist                               PT OP Goals     Row Name 09/26/18 1000          PT Short Term Goals    STG Date to Achieve 09/19/18  -     ST[facility] 1 [facility] and [facility]  -     STG 1 Progress Met  -     STG 2 TUG <= 10 seconds safely, no UE A for sit to stand.  -     STG 2 Progress Met  -     STG 3 Sit to/from stand x5, 1 UE A, <= 17 seconds.  -     STG 3 Progress Met  -     STG 4 Patient able to complete the entire 6 minute walk test covering at least 1000 feet with no rest break.  -     STG 4 Progress Met  -     STG 5 Patient able to perform tandem stance each LE lead >= 10 seconds each  -     STG 5 Progress Ongoing;Partially Met;Progressing  -        Long Term Goals    LTG Date to Achieve 10/12/18  -     LTG 1 Patient able to perform 6 minute walk test, no rest breaks, covering at least 1/4 of a mile.  -     LTG 1 Progress Ongoing  -     LTG 2 Sit to/from stand x5 no UE A <= 16 seconds.  -     LTG 2 Progress Partially Met;Ongoing  -     LTG 3 SLS each LE >= 10 seconds EO, level ground.  -     LTG 3 Progress Ongoing  -     LTG 4 Able to tandem stance each LE lead for 15 seconds each leg.  -     LTG 4 Progress Ongoing  -     LTG 5  Able to ascend/descend 3 steps reciprocally x5, HRA for balance x1 with no increase in pain.  -     LTG 5 Progress Met  -     LTG 6 B LE 5/5.  -     LTG 6 Progress Ongoing;Progressing;Partially Met  -     LTG 7 I with final HEP.  -     LTG 7 Progress Ongoing  -     LTG 8 D/C with a final HE pand free 30 day fitness formula memebership.  -     LTG 8 Progress Ongoing  -     LTG 9 I with all aquatic ther ex.  -     LTG 9 Progress New  -        Time Calculation    PT Goal Re-Cert Due Date 10/17/18  -       User Key  (r) = Recorded By, (t) = Taken By, (c) = Cosigned By    Initials Name Provider Type    Toma Quintero, PT Physical Therapist          Therapy Education  Given: HEP, Symptoms/condition management, Posture/body mechanics (POC)  Program: Reinforced  How Provided: Verbal, Demonstration  Provided to: Patient  Level of Understanding: Verbalized, Demonstrated    Outcome Measure Options: Lower Extremity Functional Scale (LEFS), 5x Sit to Stand, 6 Minute Walk Test, Timed Up and Go (TUG)  5 Times Sit to Stand  5 Times Sit to Stand (seconds): 15 seconds  5 Times Sit to Stand Comments: B UE A, good eccentric control.  6 Minute Walk Test  Distance: 1265  Supplemental O2 Used?: No  # of Rest Breaks: 0  Lower Extremity Functional Index  Any of your usual work, housework or school activities: A little bit of difficulty  Your usual hobbies, recreational or sporting activities: A little bit of difficulty  Getting into or out of the bath: A little bit of difficulty  Walking between rooms: No difficulty  Putting on your shoes or socks: Moderate difficulty  Squatting: Moderate difficulty  Lifting an object, like a bag of groceries from the floor: A little bit of difficulty  Performing light activities around your home: A little bit of difficulty  Performing heavy activities around your home: A little bit of difficulty  Getting into or out of a car: Moderate difficulty  Walking 2 blocks: Quite a  bit of difficulty  Walking a mile: Moderate difficulty  Going up or down 10 stairs (about 1 flight of stairs): Moderate difficulty  Standing for 1 hour: Moderate difficulty  Sitting for 1 hour: Moderate difficulty  Running on even ground: Moderate difficulty  Running on uneven ground: Moderate difficulty  Making sharp turns while running fast: Moderate difficulty  Hopping: Moderate difficulty  Rolling over in bed: A little bit of difficulty  Total: 48  Timed Up and Go (TUG)  TUG Test 1: 10 seconds  TUG Test 2: 10 seconds  Timed Up and Go Comments: B UE A, safe and no difficulties with test.      Time Calculation:   Start Time: 1018  Stop Time: 1105  Time Calculation (min): 47 min  Total Timed Code Minutes- PT: 47 minute(s)    Therapy Charges for Today     Code Description Service Date Service Provider Modifiers Qty    92528148634 HC PT MOBILITY CURRENT 9/26/2018 Toma Avelra, PT GP, CI 1    87359159387 HC PT MOBILITY PROJECTED 9/26/2018 Toma Avelar, PT GP, CH 1    01886367456 HC PT THER PROC EA 15 MIN 9/26/2018 Toma Avelar, PT GP 3    30751992901 HC PT THER SUPP EA 15 MIN 9/26/2018 Toma Avelar, PT GP 1          PT G-Codes  Outcome Measure Options: Lower Extremity Functional Scale (LEFS), 5x Sit to Stand, 6 Minute Walk Test, Timed Up and Go (TUG)  Total: 48  TUG Test 1: 10 seconds  TUG Test 2: 10 seconds  Functional Limitation: Mobility: Walking and moving around  Mobility: Walking and Moving Around Current Status (): At least 1 percent but less than 20 percent impaired, limited or restricted  Mobility: Walking and Moving Around Goal Status (): 0 percent impaired, limited or restricted         Toma Avelar, PT, DPT, CSCS  9/26/2018

## 2018-10-02 ENCOUNTER — APPOINTMENT (OUTPATIENT)
Dept: PHYSICAL THERAPY | Facility: HOSPITAL | Age: 53
End: 2018-10-02

## 2018-10-04 ENCOUNTER — HOSPITAL ENCOUNTER (OUTPATIENT)
Dept: PHYSICAL THERAPY | Facility: HOSPITAL | Age: 53
Setting detail: THERAPIES SERIES
Discharge: HOME | End: 2018-10-04

## 2018-10-04 DIAGNOSIS — Z86.39 HISTORY OF DIABETIC NEUROPATHY: Primary | ICD-10-CM

## 2018-10-04 NOTE — THERAPY TREATMENT NOTE
Outpatient Physical Therapy Ortho Treatment Note  [facility]     Patient Name: Jamil Olivo  : 1965  MRN: [id number]  Today's Date: 10/4/2018      Visit Date: 10/04/2018  Pt reports 0/10 pain pre treatment, 5/10 pain post treatment  Reports 50% of improvement.  Attended 6/9 visits.  Insurance available:medicare guidelines   Next MD appt: 10/17/2018.  Recertification: 10/17/2018.  Visit Dx:    ICD-10-CM ICD-9-CM   1. History of diabetic neuropathy Z86.39 V12.29       Patient Active Problem List   Diagnosis   • Respiratory failure (CMS/[facility])   • Diabetes mellitus (CMS/[facility])   • Hypertension   • CKD (chronic kidney disease) stage 4, GFR 15-29 ml/min (CMS/[facility])   • Acute on chronic systolic (congestive) heart failure        Past Medical History:   Diagnosis Date   • Asthma    • CHF (congestive heart failure) (CMS/Formerly [facility])    • Diabetes mellitus (CMS/[facility])    • Diabetic neuropathy (CMS/[facility])    • Hypertension    • Kidney disease     pt states that he is close to needing to be on dialysis        Past Surgical History:   Procedure Laterality Date   • EYE SURGERY Bilateral 2017             PT Ortho     Row Name 10/04/18 1100       Subjective Pain    Post-Treatment Pain Level 5  -TL      User Key  (r) = Recorded By, (t) = Taken By, (c) = Cosigned By    Initials Name Provider Type    Becky Villafana PTA Physical Therapy Assistant                            PT Assessment/Plan     Row Name 10/04/18 1100          PT Assessment    Assessment Comments all short term goals met. pt progressing toward long term goals.Pt struggling today secondary to not able to sleep last night. Pt legs started getting weak. PTA noted pitting edema to both legs. PTA question pt on edema. Pt stated that he took different medication that caused swelling. PTA instructed pt to make sure he is monitoring weight in the mornings after he gets up to use restroom. Pt verbalized understanding.  -TL        PT Plan     PT Frequency 2x/week   1 land/1pool  -TL     PT Plan Comments continue to work toward unmet goals. Add step ups fwd/lateral next treatment for strengthening.  -TL       User Key  (r) = Recorded By, (t) = Taken By, (c) = Cosigned By    Initials Name Provider Type    Becky Villafana PTA Physical Therapy Assistant                Modalities     Row Name 10/04/18 1100             Ice    Patient denies application of Ice Yes  -TL        User Key  (r) = Recorded By, (t) = Taken By, (c) = Cosigned By    Initials Name Provider Type    TL Becky Herrera PTA Physical Therapy Assistant                Exercises     Row Name 10/04/18 1100             Subjective Comments    Subjective Comments pt reports that he has been up most of the night. Unble to sleep. Pt states that he is very tired.   O2 sats before tx91%,89bpm,O2 after 6min of Pro ll91%,104HR  -TL         Subjective Pain    Able to rate subjective pain? yes  -TL      Pre-Treatment Pain Level 0  -TL      Post-Treatment Pain Level 5  -TL         Exercise 1    Exercise Name 1 Pro II UE/LE  -TL      Time 1 10 minutes  -TL      Additional Comments level 4   O2 sats 91% before,during and after. heart rate   -TL         Exercise 2    Exercise Name 2 tandem stance L lead   -TL      Sets 2 3  -TL      Additional Comments 10 sec, 4sec,20 sec  -TL         Exercise 3    Exercise Name 3 tandem stance R lead leg  -TL      Reps 3 3  -TL      Additional Comments 6 sec,20sec,26sec  -TL         Exercise 4    Exercise Name 4 gym walk 1/4mile  -TL      Additional Comments 6mins and 34 sec  -TL         Exercise 5    Exercise Name 5 SLS EO  -TL      Additional Comments highest 6 sec on both legs  -TL         Exercise 6    Exercise Name 6 shuttle leg press 2L  -TL      Time 6 5 mins  -TL      Additional Comments 7 cords  -TL         Exercise 7    Exercise Name 7 shuttle leg press 1press  -TL      Time 7 1.30 min   Pt started having pain in right leg  -TL      Additional  Comments 7 cords  -TL        User Key  (r) = Recorded By, (t) = Taken By, (c) = Cosigned By    Initials Name Provider Type    TL Becky Herrera, PTA Physical Therapy Assistant                               PT OP Goals     Row Name 10/04/18 1100          PT Short Term Goals    STG Date to Achieve 09/19/18  -TL     STG 1 [facility] and [facility]  -[facility]     STG 1 Progress Met  -TL     STG 2 TUG <= 10 seconds safely, no UE A for sit to stand.  -TL     STG 2 Progress Met  -TL     STG 3 Sit to/from stand x5, 1 UE A, <= 17 seconds.  -TL     STG 3 Progress Met  -TL     STG 4 Patient able to complete the entire 6 minute walk test covering at least 1000 feet with no rest break.  -TL     STG 4 Progress Met  -TL     STG 5 Patient able to perform tandem stance each LE lead >= 10 seconds each  -TL     STG 5 Progress Met  -TL        Long Term Goals    LTG Date to Achieve 10/12/18  -TL     LTG 1 Patient able to perform 6 minute walk test, no rest breaks, covering at least 1/4 of a mile.  -TL     LTG 1 Progress Ongoing;Progressing  -TL     LTG 2 Sit to/from stand x5 no UE A <= 16 seconds.  -TL     LTG 2 Progress Partially Met;Ongoing  -TL     LTG 3 SLS each LE >= 10 seconds EO, level ground.  -TL     LTG 3 Progress Ongoing  -TL     LTG 4 Able to tandem stance each LE lead for 15 seconds each leg.  -TL     LTG 4 Progress Ongoing;Progressing  -TL     LTG 5 Able to ascend/descend 3 steps reciprocally x5, HRA for balance x1 with no increase in pain.  -TL     LTG 5 Progress Met  -TL     LTG 6 B LE 5/5.  -TL     LTG 6 Progress Ongoing;Progressing;Partially Met  -TL     LTG 7 I with final HEP.  -TL     LTG 7 Progress Ongoing  -TL     LTG 8 D/C with a final HE pand free 30 day fitness formula memebership.  -TL     LTG 8 Progress Ongoing  -TL     LTG 9 I with all aquatic ther ex.  -TL     LTG 9 Progress New  -TL        Time Calculation    PT Goal Re-Cert Due Date 10/17/17  -TL       User Key  (r) =  Recorded By, (t) = Taken By, (c) = Cosigned By    Initials Name Provider Type    Becky Villafana PTA Physical Therapy Assistant          Therapy Education  Education Details: purse lip breathing  Given: Symptoms/condition management, Posture/body mechanics  Program: Reinforced  How Provided: Verbal, Demonstration  Provided to: Patient  Level of Understanding: Verbalized, Demonstrated              Time Calculation:   Start Time: 1102  Stop Time: 1154  Time Calculation (min): 52 min  Total Timed Code Minutes- PT: 52 minute(s)  Therapy Suggested Charges     Code   Minutes Charges    None                         Becky Herrera PTA  10/4/2018

## 2018-10-10 ENCOUNTER — APPOINTMENT (OUTPATIENT)
Dept: PHYSICAL THERAPY | Facility: HOSPITAL | Age: 53
End: 2018-10-10

## 2018-10-15 ENCOUNTER — APPOINTMENT (OUTPATIENT)
Dept: PHYSICAL THERAPY | Facility: HOSPITAL | Age: 53
End: 2018-10-15

## 2018-10-24 ENCOUNTER — APPOINTMENT (OUTPATIENT)
Dept: PHYSICAL THERAPY | Facility: HOSPITAL | Age: 53
End: 2018-10-24

## 2018-10-29 ENCOUNTER — APPOINTMENT (OUTPATIENT)
Dept: PHYSICAL THERAPY | Facility: HOSPITAL | Age: 53
End: 2018-10-29

## 2018-11-01 ENCOUNTER — APPOINTMENT (OUTPATIENT)
Dept: PHYSICAL THERAPY | Facility: HOSPITAL | Age: 53
End: 2018-11-01

## 2018-11-07 ENCOUNTER — HOSPITAL ENCOUNTER (OUTPATIENT)
Dept: PHYSICAL THERAPY | Facility: HOSPITAL | Age: 53
Setting detail: THERAPIES SERIES
Discharge: HOME | End: 2018-11-07

## 2018-11-07 DIAGNOSIS — Z86.39 HISTORY OF DIABETIC NEUROPATHY: Primary | ICD-10-CM

## 2018-11-07 PROCEDURE — 97110 THERAPEUTIC EXERCISES: CPT | Performed by: PHYSICAL THERAPIST

## 2018-11-07 PROCEDURE — G8980 MOBILITY D/C STATUS: HCPCS | Performed by: PHYSICAL THERAPIST

## 2018-11-07 PROCEDURE — G8979 MOBILITY GOAL STATUS: HCPCS | Performed by: PHYSICAL THERAPIST

## 2018-11-07 NOTE — THERAPY DISCHARGE NOTE
Outpatient Physical Therapy Ortho Progress Note/Discharge Summary  [facility]  Toma Avelar, PT, DPT, CSCS       Patient Name: Jamil Olivo  : 1965  MRN: [id number]  Today's Date: 2018      Visit Date: 2018     Pt reports 4/10 pain pre treatment, 2/10 pain post treatment  Reports 30% of improvement.  Attended 7/13 visits.  Insurance available: medicare guidelines  Next MD appt: 2018.  Recertification: N/A    Visit Dx:    ICD-10-CM ICD-9-CM   1. History of diabetic neuropathy Z86.39 V12.29       Patient Active Problem List   Diagnosis   • Respiratory failure (CMS/[facility])   • Diabetes mellitus (CMS/[facility])   • Hypertension   • CKD (chronic kidney disease) stage 4, GFR 15-29 ml/min (CMS/[facility])   • Acute on chronic systolic (congestive) heart failure        Past Medical History:   Diagnosis Date   • Asthma    • CHF (congestive heart failure) (CMS/[facility])    • Diabetes mellitus (CMS/[facility])    • Diabetic neuropathy (CMS/[facility])    • Hypertension    • Kidney disease     pt states that he is close to needing to be on dialysis        Past Surgical History:   Procedure Laterality Date   • EYE SURGERY Bilateral 2017     Number of days off work: N/A    Changes to medications: None noted.    Changes to MD orders: None noted.          PT Ortho     Row Name 18 1100       Subjective Comments    Subjective Comments Patient erports he has been sick.  -AJ       Precautions and Contraindications    Precautions/Limitations fall precautions  -AJ       Subjective Pain    Able to rate subjective pain? yes  -AJ    Pre-Treatment Pain Level 4  -AJ    Post-Treatment Pain Level 2  -AJ       Posture/Observations    Posture- WNL Posture is WNL  -AJ    Posture/Observations Comments No acute distress, fair overall postural awareness.  -AJ       General ROM    GENERAL ROM COMMENTS AROM for B UE/LE/Trunk all WNL  -AJ       Sensation    Sensation WNL? WFL  -AJ    Light Touch Partial  "deficits in the RLE;Partial deficits in the LLE  -AJ    Additional Comments patient reports numbness in B feet.  -AJ       Lower Extremity Flexibility    Hamstrings Bilateral:;Mildly limited  -AJ    Gastrocnemius Bilateral:;Mildly limited  -AJ    Soleus Bilateral:;Mildly limited  -AJ       Pathomechanics    Pathomechanics Comments No abnormal pathomechanic identified with normal ROM/activity  -       Balance Skills Training    SLS B 3-4 seconds  -    Ankle Strategy Assessment (Balance) Tandem Stance each LE lead, R 6-7\", L 7-8\"  -      User Key  (r) = Recorded By, (t) = Taken By, (c) = Cosigned By    Initials Name Provider Type    Toma Quintero, PT Physical Therapist           Barriers to Rehab: Include significant or possible arthritic/degenerative changes that have occurred within the joint/spine     Safety Issues: None noted.                  PT Assessment/Plan     Row Name 11/07/18 1100          PT Assessment    Functional Limitations Impaired gait;Impaired locomotion;Limitation in home management;Limitations in community activities;Performance in leisure activities  -     Impairments Balance;Endurance;Gait;Impaired flexibility;Impaired muscle endurance;Impaired muscle length;Impaired muscle power;Sensation;Pain;Muscle strength  -     Assessment Comments Balance has plateaued. patient's main isues at this time appear to be endurance and cardiovascular related. patient tod ma want ot give him a pacemaker, but he has to be cleared by his kidney doctor first. Instructed the patient that after his pacemaker we could see him for possible cardiac rehab for improving endurance with MD zaldivar.  -AJ     Rehab Potential Fair  -     Patient/caregiver participated in establishment of treatment plan and goals Yes  -AJ        PT Plan    PT Frequency --   N/A  -AJ     Predicted Duration of Therapy Intervention (Therapy Eval) N/A  -AJ     PT Plan Comments D/C today due ot main limiting factor " breathing and heart. No significant progress with current goals or balance.  -       User Key  (r) = Recorded By, (t) = Taken By, (c) = Cosigned By    Initials Name Provider Type    Toma Quintero, PT Physical Therapist                    Exercises     Row Name 11/07/18 1100             Subjective Comments    Subjective Comments Patient erports he has been sick.  -AJ         Subjective Pain    Able to rate subjective pain? yes  -AJ      Pre-Treatment Pain Level 4  -AJ      Post-Treatment Pain Level 2  -AJ         Exercise 1    Exercise Name 1 Pro II UE/LE  -AJ      Time 1 10 minutes  -AJ      Additional Comments L 4.0  -AJ         Exercise 2    Exercise Name 2 TUG  -AJ      Reps 2 2  -AJ         Exercise 3    Exercise Name 3 Sit to/from stand  -AJ      Reps 3 5  -AJ         Exercise 4    Exercise Name 4 6 minute walk test  -AJ      Time 4 6 minutes  -AJ         Exercise 5    Exercise Name 5 B SLS EO  -AJ      Reps 5 4-5  -AJ         Exercise 6    Exercise Name 6 Tandem stance, each LE lead  -AJ      Reps 6 4-5  -AJ         Exercise 7    Exercise Name 7 St. alt marching  -AJ      Reps 7 2  -AJ      Time 7 30 seconds  -AJ         Exercise 8    Exercise Name 8 St. alt hip abd  -AJ      Reps 8 2  -AJ      Time 8 30 seconds  -AJ         Exercise 9    Exercise Name 9 St. alt hip ext  -AJ      Reps 9 2  -AJ      Time 9 30 seconds  -AJ        User Key  (r) = Recorded By, (t) = Taken By, (c) = Cosigned By    Initials Name Provider Type    Toma Quintero, PT Physical Therapist                               PT OP Goals     Row Name 11/07/18 1100          PT Short Term Goals    STG Date to Achieve 09/19/18  -     STG 1 I with HEP and have additions/changes by next [facility].  -AJ     STG 1 Progress Met  -     STG 2 TUG <= 10 seconds safely, no UE A for sit to stand.  -     STG 2 Progress Met  -     STG 3 Sit to/from stand x5, 1 UE A, <= 17 seconds.  -     STG 3 Progress Met  -     STG 4  Patient able to complete the entire 6 minute walk test covering at least 1000 feet with no rest break.  -     STG 4 Progress Partially Met  -     STG 4 Progress Comments previously met, but not today.  -     STG 5 Patient able to perform tandem stance each LE lead >= 10 seconds each  -     STG 5 Progress Partially Met  -     STG 5 Progress Comments Previously met, but not today  -        Long Term Goals    LTG Date to Achieve 10/12/18  -     LTG 1 Patient able to perform 6 minute walk test, no rest breaks, covering at least 1/4 of a mile.  -AJ     LTG 1 Progress Not Met  -     LTG 2 Sit to/from stand x5 no UE A <= 16 seconds.  -     LTG 2 Progress Met  -     LTG 3 SLS each LE >= 10 seconds EO, level ground.  -AJ     LTG 3 Progress Not Met  -     LTG 4 Able to tandem stance each LE lead for 15 seconds each leg.  -     LTG 4 Progress Ongoing;Progressing;Not Met  -     LTG 5 Able to ascend/descend 3 steps reciprocally x5, HRA for balance x1 with no increase in pain.  -     LTG 5 Progress Met  -     LTG 6 B LE 5/5.  -     LTG 6 Progress Met  -     LTG 7 I with final HEP.  -     LTG 7 Progress Met  -     LTG 8 D/C with a final HE pand free 30 day fitness formula memebership.  -     LTG 8 Progress Ongoing  -     LTG 9 I with all aquatic ther ex.  -     LTG 9 Progress New  -        Time Calculation    PT Goal Re-Cert Due Date --   N/A  -       User Key  (r) = Recorded By, (t) = Taken By, (c) = Cosigned By    Initials Name Provider Type    Toma Quintero, PT Physical Therapist          Therapy Education  Given: Symptoms/condition management, Posture/body mechanics  Program: Reinforced  How Provided: Verbal, Demonstration  Provided to: Patient  Level of Understanding: Verbalized, Demonstrated    Outcome Measure Options: Lower Extremity Functional Scale (LEFS), 5x Sit to Stand, 6 Minute Walk Test, Timed Up and Go (TUG)  5 Times Sit to Stand  5 Times Sit to Stand  (seconds): 15 seconds  5 Times Sit to Stand Comments: B UE A, good eccentric control.  6 Minute Walk Test  Distance: 755  Device Used: No  Supplemental O2 Used?: No  # of Rest Breaks: 1 (Due to dyspnea)  6 Minute Walk Comments: Walked 2 min 45 seconds, then rested for 2 minutes, then able ot finish remaining time.  Lower Extremity Functional Index  Any of your usual work, housework or school activities: A little bit of difficulty  Your usual hobbies, recreational or sporting activities: A little bit of difficulty  Getting into or out of the bath: A little bit of difficulty  Walking between rooms: A little bit of difficulty  Putting on your shoes or socks: Moderate difficulty  Squatting: Moderate difficulty  Lifting an object, like a bag of groceries from the floor: Moderate difficulty  Performing light activities around your home: Moderate difficulty  Performing heavy activities around your home: Moderate difficulty  Getting into or out of a car: No difficulty  Walking 2 blocks: No difficulty  Walking a mile: Quite a bit of difficulty  Going up or down 10 stairs (about 1 flight of stairs): Moderate difficulty  Standing for 1 hour: Moderate difficulty  Sitting for 1 hour: Moderate difficulty  Running on even ground: Moderate difficulty  Running on uneven ground: Moderate difficulty  Making sharp turns while running fast: Quite a bit of difficulty  Hopping: Quite a bit of difficulty  Rolling over in bed: A little bit of difficulty  Total: 46  Timed Up and Go (TUG)  TUG Test 1: 10 seconds  TUG Test 2: 10 seconds  Timed Up and Go Comments: B UE A ewith sit to/from stand, no ataxia noted.      Time Calculation:   Start Time: 1105  Stop Time: 1200  Time Calculation (min): 55 min  Total Timed Code Minutes- PT: 55 minute(s)    Therapy Charges for Today     Code Description Service Date Service Provider Modifiers Qty    31057619521 HC PT MOBILITY PROJECTED 11/7/2018 Toma Avelar, PT GP, CI 1    68770693319 HC PT  MOBILITY DISCHARGE 11/7/2018 Toma Avelar, PT GP, CJ 1    59940367400 HC PT THER PROC EA 15 MIN 11/7/2018 Toma Avelar, PT GP 4    24624144652 HC PT THER SUPP EA 15 MIN 11/7/2018 Toma Avelar, PT GP 1          PT G-Codes  Outcome Measure Options: Lower Extremity Functional Scale (LEFS), 5x Sit to Stand, 6 Minute Walk Test, Timed Up and Go (TUG)  Total: 46  TUG Test 1: 10 seconds  TUG Test 2: 10 seconds  Functional Limitation: Mobility: Walking and moving around  Mobility: Walking and Moving Around Goal Status (): At least 1 percent but less than 20 percent impaired, limited or restricted  Mobility: Walking and Moving Around Discharge Status (): At least 20 percent but less than 40 percent impaired, limited or restricted     OP PT Discharge Summary  Date of Discharge: 11/07/18  Reason for Discharge: Independent, Maximum functional potential achieved  Outcomes Achieved: Patient able to partially acheive established goals, Refer to plan of care for updates on goals achieved  Discharge Destination: Home with home program  Discharge Instructions/Additional Comments: D/C with final HEP and free 30 day fitness formula memebership.      Toma Avelar, PT, DPT, CSCS  11/7/2018

## 2018-11-14 ENCOUNTER — HOSPITAL ENCOUNTER (INPATIENT)
Facility: HOSPITAL | Age: 53
LOS: 6 days | Discharge: HOME | End: 2018-11-21
Attending: FAMILY MEDICINE | Admitting: INTERNAL MEDICINE

## 2018-11-14 DIAGNOSIS — I13.11: ICD-10-CM

## 2018-11-14 DIAGNOSIS — I25.5 ISCHEMIC CARDIOMYOPATHY: ICD-10-CM

## 2018-11-14 DIAGNOSIS — N18.5: ICD-10-CM

## 2018-11-14 DIAGNOSIS — N18.4 CKD (CHRONIC KIDNEY DISEASE) STAGE 4, GFR 15-29 ML/MIN (HCC): ICD-10-CM

## 2018-11-14 DIAGNOSIS — I50.9 CONGESTIVE HEART FAILURE, UNSPECIFIED HF CHRONICITY, UNSPECIFIED HEART FAILURE TYPE (HCC): Primary | ICD-10-CM

## 2018-11-15 ENCOUNTER — APPOINTMENT (OUTPATIENT)
Dept: GENERAL RADIOLOGY | Facility: HOSPITAL | Age: 53
End: 2018-11-15

## 2018-11-15 ENCOUNTER — APPOINTMENT (OUTPATIENT)
Dept: ULTRASOUND IMAGING | Facility: HOSPITAL | Age: 53
End: 2018-11-15

## 2018-11-15 PROBLEM — I50.9 CHF (CONGESTIVE HEART FAILURE) (HCC): Status: ACTIVE | Noted: 2018-11-15

## 2018-11-15 LAB
ANION GAP SERPL CALCULATED.3IONS-SCNC: 11 MMOL/L (ref 5–15)
BACTERIA UR QL AUTO: ABNORMAL /HPF
BILIRUB UR QL STRIP: NEGATIVE
BUN BLD-MCNC: 91 MG/DL (ref 7–21)
BUN/CREAT SERPL: 15.4 (ref 7–25)
CALCIUM SPEC-SCNC: 8.9 MG/DL (ref 8.4–10.2)
CHLORIDE SERPL-SCNC: 103 MMOL/L (ref 95–110)
CLARITY UR: CLEAR
CO2 SERPL-SCNC: 24 MMOL/L (ref 22–31)
COLOR UR: YELLOW
CREAT BLD-MCNC: 5.9 MG/DL (ref 0.7–1.3)
DEPRECATED RDW RBC AUTO: 41.6 FL (ref 35.1–43.9)
ERYTHROCYTE [DISTWIDTH] IN BLOOD BY AUTOMATED COUNT: 13.3 % (ref 11.5–14.5)
GFR SERPL CREATININE-BSD FRML MDRD: 12 ML/MIN/1.73 (ref 56–130)
GFR SERPL CREATININE-BSD FRML MDRD: ABNORMAL ML/MIN/1.73 (ref 56–130)
GLUCOSE BLD-MCNC: 113 MG/DL (ref 60–100)
GLUCOSE BLDC GLUCOMTR-MCNC: 126 MG/DL (ref 70–130)
GLUCOSE BLDC GLUCOMTR-MCNC: 156 MG/DL (ref 70–130)
GLUCOSE BLDC GLUCOMTR-MCNC: 158 MG/DL (ref 70–130)
GLUCOSE BLDC GLUCOMTR-MCNC: 169 MG/DL (ref 70–130)
GLUCOSE UR STRIP-MCNC: NEGATIVE MG/DL
HCT VFR BLD AUTO: 34.5 % (ref 39–49)
HGB BLD-MCNC: 11.5 G/DL (ref 13.7–17.3)
HGB UR QL STRIP.AUTO: ABNORMAL
HYALINE CASTS UR QL AUTO: ABNORMAL /LPF
KETONES UR QL STRIP: NEGATIVE
LEUKOCYTE ESTERASE UR QL STRIP.AUTO: NEGATIVE
MAGNESIUM SERPL-MCNC: 2.5 MG/DL (ref 1.6–2.3)
MCH RBC QN AUTO: 28.8 PG (ref 26.5–34)
MCHC RBC AUTO-ENTMCNC: 33.3 G/DL (ref 31.5–36.3)
MCV RBC AUTO: 86.5 FL (ref 80–98)
NITRITE UR QL STRIP: NEGATIVE
PH UR STRIP.AUTO: 6 [PH] (ref 5–9)
PHOSPHATE SERPL-MCNC: 5.2 MG/DL (ref 2.4–4.4)
PLATELET # BLD AUTO: 315 10*3/MM3 (ref 150–450)
PMV BLD AUTO: 9.6 FL (ref 8–12)
POTASSIUM BLD-SCNC: 4 MMOL/L (ref 3.5–5.1)
PROT UR QL STRIP: ABNORMAL
RBC # BLD AUTO: 3.99 10*6/MM3 (ref 4.37–5.74)
RBC # UR: ABNORMAL /HPF
REF LAB TEST METHOD: ABNORMAL
SODIUM BLD-SCNC: 138 MMOL/L (ref 137–145)
SP GR UR STRIP: 1.01 (ref 1–1.03)
SQUAMOUS #/AREA URNS HPF: ABNORMAL /HPF
TROPONIN I SERPL-MCNC: 0.08 NG/ML
TROPONIN I SERPL-MCNC: 0.09 NG/ML
TROPONIN I SERPL-MCNC: 0.1 NG/ML
UROBILINOGEN UR QL STRIP: ABNORMAL
WBC NRBC COR # BLD: 10.2 10*3/MM3 (ref 3.2–9.8)
WBC UR QL AUTO: ABNORMAL /HPF
WHOLE BLOOD HOLD SPECIMEN: NORMAL

## 2018-11-15 PROCEDURE — 63710000001 INSULIN ASPART PER 5 UNITS: Performed by: FAMILY MEDICINE

## 2018-11-15 PROCEDURE — 25010000002 HEPARIN (PORCINE) PER 1000 UNITS: Performed by: FAMILY MEDICINE

## 2018-11-15 PROCEDURE — 83735 ASSAY OF MAGNESIUM: CPT | Performed by: FAMILY MEDICINE

## 2018-11-15 PROCEDURE — 71045 X-RAY EXAM CHEST 1 VIEW: CPT

## 2018-11-15 PROCEDURE — 82962 GLUCOSE BLOOD TEST: CPT

## 2018-11-15 PROCEDURE — 84484 ASSAY OF TROPONIN QUANT: CPT | Performed by: FAMILY MEDICINE

## 2018-11-15 PROCEDURE — 76775 US EXAM ABDO BACK WALL LIM: CPT

## 2018-11-15 PROCEDURE — 94760 N-INVAS EAR/PLS OXIMETRY 1: CPT

## 2018-11-15 PROCEDURE — 84100 ASSAY OF PHOSPHORUS: CPT | Performed by: FAMILY MEDICINE

## 2018-11-15 PROCEDURE — 85027 COMPLETE CBC AUTOMATED: CPT | Performed by: FAMILY MEDICINE

## 2018-11-15 PROCEDURE — 81001 URINALYSIS AUTO W/SCOPE: CPT | Performed by: FAMILY MEDICINE

## 2018-11-15 PROCEDURE — 63710000001 INSULIN DETEMIR PER 5 UNITS: Performed by: FAMILY MEDICINE

## 2018-11-15 PROCEDURE — 94799 UNLISTED PULMONARY SVC/PX: CPT

## 2018-11-15 PROCEDURE — 80048 BASIC METABOLIC PNL TOTAL CA: CPT | Performed by: FAMILY MEDICINE

## 2018-11-15 RX ORDER — BUMETANIDE 0.25 MG/ML
2 INJECTION INTRAMUSCULAR; INTRAVENOUS ONCE
Status: COMPLETED | OUTPATIENT
Start: 2018-11-15 | End: 2018-11-15

## 2018-11-15 RX ORDER — SODIUM CHLORIDE 0.9 % (FLUSH) 0.9 %
3-10 SYRINGE (ML) INJECTION AS NEEDED
Status: DISCONTINUED | OUTPATIENT
Start: 2018-11-15 | End: 2018-11-21 | Stop reason: HOSPADM

## 2018-11-15 RX ORDER — HYDRALAZINE HYDROCHLORIDE 50 MG/1
75 TABLET, FILM COATED ORAL 3 TIMES DAILY
COMMUNITY
End: 2019-11-26

## 2018-11-15 RX ORDER — NITROGLYCERIN 20 MG/100ML
10-50 INJECTION INTRAVENOUS
Status: DISCONTINUED | OUTPATIENT
Start: 2018-11-15 | End: 2018-11-17

## 2018-11-15 RX ORDER — HYDRALAZINE HYDROCHLORIDE 25 MG/1
25 TABLET, FILM COATED ORAL EVERY 8 HOURS SCHEDULED
Status: DISCONTINUED | OUTPATIENT
Start: 2018-11-15 | End: 2018-11-17

## 2018-11-15 RX ORDER — METOLAZONE 10 MG/1
10 TABLET ORAL DAILY
COMMUNITY
End: 2018-11-21 | Stop reason: HOSPADM

## 2018-11-15 RX ORDER — LABETALOL 100 MG/1
50 TABLET, FILM COATED ORAL 3 TIMES DAILY
COMMUNITY
End: 2018-11-21 | Stop reason: HOSPADM

## 2018-11-15 RX ORDER — GUANFACINE 1 MG/1
1 TABLET ORAL NIGHTLY
COMMUNITY
End: 2018-11-21 | Stop reason: HOSPADM

## 2018-11-15 RX ORDER — GUANFACINE 1 MG/1
1 TABLET ORAL NIGHTLY
Status: DISCONTINUED | OUTPATIENT
Start: 2018-11-15 | End: 2018-11-17

## 2018-11-15 RX ORDER — AMLODIPINE BESYLATE 10 MG/1
10 TABLET ORAL
Status: DISCONTINUED | OUTPATIENT
Start: 2018-11-15 | End: 2018-11-18

## 2018-11-15 RX ORDER — LABETALOL 200 MG/1
200 TABLET, FILM COATED ORAL EVERY 12 HOURS SCHEDULED
Status: DISCONTINUED | OUTPATIENT
Start: 2018-11-15 | End: 2018-11-16

## 2018-11-15 RX ORDER — LABETALOL 100 MG/1
50 TABLET, FILM COATED ORAL 3 TIMES DAILY
Status: DISCONTINUED | OUTPATIENT
Start: 2018-11-15 | End: 2018-11-15

## 2018-11-15 RX ORDER — CARVEDILOL 25 MG/1
25 TABLET ORAL 2 TIMES DAILY WITH MEALS
Status: DISCONTINUED | OUTPATIENT
Start: 2018-11-15 | End: 2018-11-15

## 2018-11-15 RX ORDER — LANOLIN ALCOHOL/MO/W.PET/CERES
6 CREAM (GRAM) TOPICAL NIGHTLY
Status: DISCONTINUED | OUTPATIENT
Start: 2018-11-15 | End: 2018-11-21 | Stop reason: HOSPADM

## 2018-11-15 RX ORDER — DEXTROSE MONOHYDRATE 25 G/50ML
25 INJECTION, SOLUTION INTRAVENOUS
Status: DISCONTINUED | OUTPATIENT
Start: 2018-11-15 | End: 2018-11-21 | Stop reason: HOSPADM

## 2018-11-15 RX ORDER — SODIUM CHLORIDE 0.9 % (FLUSH) 0.9 %
3 SYRINGE (ML) INJECTION EVERY 12 HOURS SCHEDULED
Status: DISCONTINUED | OUTPATIENT
Start: 2018-11-15 | End: 2018-11-21 | Stop reason: HOSPADM

## 2018-11-15 RX ORDER — HEPARIN SODIUM 5000 [USP'U]/ML
5000 INJECTION, SOLUTION INTRAVENOUS; SUBCUTANEOUS EVERY 8 HOURS SCHEDULED
Status: DISCONTINUED | OUTPATIENT
Start: 2018-11-15 | End: 2018-11-16

## 2018-11-15 RX ORDER — NICOTINE POLACRILEX 4 MG
15 LOZENGE BUCCAL
Status: DISCONTINUED | OUTPATIENT
Start: 2018-11-15 | End: 2018-11-21 | Stop reason: HOSPADM

## 2018-11-15 RX ORDER — ACETAMINOPHEN 325 MG/1
650 TABLET ORAL EVERY 6 HOURS PRN
Status: DISCONTINUED | OUTPATIENT
Start: 2018-11-15 | End: 2018-11-21 | Stop reason: HOSPADM

## 2018-11-15 RX ORDER — INSULIN GLARGINE 100 [IU]/ML
30 INJECTION, SOLUTION SUBCUTANEOUS NIGHTLY
COMMUNITY
End: 2018-11-21 | Stop reason: HOSPADM

## 2018-11-15 RX ORDER — FAMOTIDINE 40 MG/1
40 TABLET, FILM COATED ORAL DAILY
Status: DISCONTINUED | OUTPATIENT
Start: 2018-11-15 | End: 2018-11-21 | Stop reason: HOSPADM

## 2018-11-15 RX ADMIN — AMLODIPINE BESYLATE 10 MG: 10 TABLET ORAL at 15:54

## 2018-11-15 RX ADMIN — INSULIN ASPART 3 UNITS: 100 INJECTION, SOLUTION INTRAVENOUS; SUBCUTANEOUS at 17:02

## 2018-11-15 RX ADMIN — HEPARIN SODIUM 5000 UNITS: 5000 INJECTION INTRAVENOUS; SUBCUTANEOUS at 05:13

## 2018-11-15 RX ADMIN — HYDRALAZINE HYDROCHLORIDE 25 MG: 25 TABLET ORAL at 21:00

## 2018-11-15 RX ADMIN — LABETALOL HYDROCHLORIDE 200 MG: 200 TABLET, FILM COATED ORAL at 20:57

## 2018-11-15 RX ADMIN — SODIUM CHLORIDE, PRESERVATIVE FREE 3 ML: 5 INJECTION INTRAVENOUS at 01:36

## 2018-11-15 RX ADMIN — BUMETANIDE 2 MG: 0.25 INJECTION INTRAMUSCULAR; INTRAVENOUS at 14:03

## 2018-11-15 RX ADMIN — INSULIN ASPART 3 UNITS: 100 INJECTION, SOLUTION INTRAVENOUS; SUBCUTANEOUS at 01:41

## 2018-11-15 RX ADMIN — NITROGLYCERIN 10 MCG/MIN: 20 INJECTION INTRAVENOUS at 04:48

## 2018-11-15 RX ADMIN — BUMETANIDE 2 MG: 0.25 INJECTION INTRAMUSCULAR; INTRAVENOUS at 01:36

## 2018-11-15 RX ADMIN — INSULIN DETEMIR 20 UNITS: 100 INJECTION, SOLUTION SUBCUTANEOUS at 20:58

## 2018-11-15 RX ADMIN — GUANFACINE 1 MG: 1 TABLET ORAL at 01:36

## 2018-11-15 RX ADMIN — INSULIN DETEMIR 20 UNITS: 100 INJECTION, SOLUTION SUBCUTANEOUS at 01:37

## 2018-11-15 RX ADMIN — HEPARIN SODIUM 5000 UNITS: 5000 INJECTION INTRAVENOUS; SUBCUTANEOUS at 21:00

## 2018-11-15 RX ADMIN — SODIUM CHLORIDE, PRESERVATIVE FREE 3 ML: 5 INJECTION INTRAVENOUS at 20:59

## 2018-11-15 RX ADMIN — HEPARIN SODIUM 5000 UNITS: 5000 INJECTION INTRAVENOUS; SUBCUTANEOUS at 14:00

## 2018-11-15 RX ADMIN — LABETALOL HYDROCHLORIDE 200 MG: 200 TABLET, FILM COATED ORAL at 14:04

## 2018-11-15 RX ADMIN — INSULIN ASPART 3 UNITS: 100 INJECTION, SOLUTION INTRAVENOUS; SUBCUTANEOUS at 20:58

## 2018-11-15 RX ADMIN — MELATONIN 6 MG: 3 TAB ORAL at 23:20

## 2018-11-15 RX ADMIN — HYDRALAZINE HYDROCHLORIDE 25 MG: 25 TABLET ORAL at 15:55

## 2018-11-15 RX ADMIN — SODIUM CHLORIDE, PRESERVATIVE FREE 3 ML: 5 INJECTION INTRAVENOUS at 09:25

## 2018-11-15 RX ADMIN — GUANFACINE 1 MG: 1 TABLET ORAL at 20:57

## 2018-11-15 NOTE — CONSULTS
[facility]  [address] [place] [address]. [address]   - [phone]    [phone]     Consultation         PATIENT  DEMOGRAPHICS   PATIENT NAME: Jamil Olivo                      PHYSICIAN: Mark Crawley MD  : 1965  MRN: [id number]    Subjective   SUBJECTIVE   Referring Provider: Dr Borges  Reason for Consultation: ckd 5 fluid overload  History of present illness:      Mr. Olivo is a pleasant 53-year-old gentleman with a past medical history of diabetes mellitus 2 hypertension and systolic heart failure with an EF of 25% before, came in with the increasing dyspnea and fluid gain.  He has marked anasarca and worsening lower extremity edema.  Patient is on metolazone and Lasix at home.    After discussing with the patient his creatinine appears to be more than 5 in the outpatient setting.  He has 3+ albuminuria here.  His creatinine in 2017 was 2.9.  According to him his GFR was close to 16.  Patient is very apprehensive for starting on dialysis.  According to him he doesn't want to start hemodialysis.  His breathing is overall better.  Patient has received IV Lasix at the outside hospital and 2 mg Bumex here.  He is started on nitro drip because of elevated blood pressure and pulmonary edema.    Past Medical History:   Diagnosis Date   • Asthma    • CHF (congestive heart failure) (CMS/[facility])    • Diabetes mellitus (CMS/[facility])    • Diabetic neuropathy (CMS/[facility])    • Hypertension    • Kidney disease 2018    pt states that he is close to needing to be on dialysis     Past Surgical History:   Procedure Laterality Date   • EYE SURGERY Bilateral 2017     Family History   Problem Relation Age of Onset   • Hypertension Mother    • Diabetes Mother    • Hypertension Father    • Diabetes Father    • Asthma Maternal Grandfather      Social History     Tobacco Use   • Smoking status: Never Smoker   • Smokeless tobacco: Never Used   Substance Use Topics   • Alcohol use: No   •  "Drug use: No     Allergies:  Motrin [ibuprofen]     REVIEW OF SYSTEMS    Review of Systems   Constitutional: Positive for unexpected weight change. Negative for chills and fever.   Respiratory: Positive for shortness of breath. Negative for chest tightness.    Cardiovascular: Positive for leg swelling. Negative for chest pain.   Gastrointestinal: Negative for abdominal pain, diarrhea and nausea.   Genitourinary: Negative for dysuria, flank pain and hematuria.   Neurological: Negative for dizziness, syncope and weakness.       Objective   OBJECTIVE   Vital Signs  Temp:  [97.6 °F (36.4 °C)-98.5 °F (36.9 °C)] 98.5 °F (36.9 °C)  Heart Rate:  [] 99  Resp:  [18-20] 18  BP: (133-185)/() 184/125    Flowsheet Rows      First Filed Value   Admission Height  180.3 cm (71\") Documented at 11/14/2018 2335   Admission Weight  106 kg (232 lb 9.6 oz) Documented at 11/14/2018 2335           I/O last 3 completed shifts:  In: 240 [P.O.:240]  Out: -     PHYSICAL EXAM    Physical Exam   Constitutional: He is oriented to person, place, and time. He appears well-developed.   HENT:   Head: Normocephalic.   Eyes: Pupils are equal, round, and reactive to light.   Neck: JVD present.   Cardiovascular: Normal rate, regular rhythm and normal heart sounds.   Pulmonary/Chest: Effort normal and breath sounds normal.   Abdominal: Soft. Bowel sounds are normal.   Musculoskeletal: He exhibits edema.   Neurological: He is alert and oriented to person, place, and time.       RESULTS   Results Review:    Results from last 7 days   Lab Units  11/15/18   0517   SODIUM mmol/L  138   POTASSIUM mmol/L  4.0   CHLORIDE mmol/L  103   CO2 mmol/L  24.0   BUN mg/dL  91*   CREATININE mg/dL  5.90*   CALCIUM mg/dL  8.9   GLUCOSE mg/dL  113*       Estimated Creatinine Clearance: 17.7 mL/min (A) (by C-G formula based on SCr of 5.9 mg/dL (H)).    Results from last 7 days   Lab Units  11/15/18   0517   MAGNESIUM mg/dL  2.5*   PHOSPHORUS mg/dL  5.2*         "     Results from last 7 days   Lab Units  11/15/18   0141   WBC 10*3/mm3  10.20*   HEMOGLOBIN g/dL  11.5*   PLATELETS 10*3/mm3  315              MEDICATIONS      amLODIPine 10 mg Oral Q24H   bumetanide 2 mg Intravenous Once   famotidine 40 mg Oral Daily   guanFACINE 1 mg Oral Nightly   heparin (porcine) 5,000 Units Subcutaneous Q8H   hydrALAZINE 25 mg Oral Q8H   insulin aspart 0-14 Units Subcutaneous 4x Daily AC & at Bedtime   insulin detemir 20 Units Subcutaneous Nightly   labetalol 200 mg Oral Q12H   sodium chloride 3 mL Intravenous Q12H       nitroglycerin 10-50 mcg/min Last Rate: 25 mcg/min (11/15/18 1124)     Medications Prior to Admission   Medication Sig Dispense Refill Last Dose   • acetaminophen (TYLENOL) 325 MG tablet Take 2 tablets by mouth Every 6 (Six) Hours As Needed for Mild Pain (1-3) or Headache. 1 bottle 1    • albuterol (PROVENTIL HFA;VENTOLIN HFA) 108 (90 BASE) MCG/ACT inhaler Inhale 2 puffs Every 4 (Four) Hours As Needed for Wheezing. 1 inhaler 1    • furosemide (LASIX) 40 MG tablet Take 40 mg by mouth 2 (Two) Times a Day.   3/22/2017 at Unknown time   • guanFACINE (TENEX) 1 MG tablet Take 1 mg by mouth Every Night.      • hydrALAZINE (APRESOLINE) 50 MG tablet Take 75 mg by mouth 3 (Three) Times a Day.      • insulin glargine (LANTUS) 100 UNIT/ML injection Inject 30 Units under the skin into the appropriate area as directed Every Night.      • isosorbide mononitrate (IMDUR) 60 MG 24 hr tablet Take 1 tablet by mouth Daily. (Patient taking differently: Take 10 mg by mouth 3 (Three) Times a Day.) 30 tablet 1    • labetalol (NORMODYNE) 100 MG tablet Take 50 mg by mouth 3 (Three) Times a Day.      • metOLazone (ZAROXOLYN) 10 MG tablet Take 10 mg by mouth Daily.      • amLODIPine (NORVASC) 10 MG tablet Take 10 mg by mouth Daily.   3/22/2017 at Unknown time   • benazepril (LOTENSIN) 20 MG tablet Take 40 mg by mouth Daily.   3/22/2017 at Unknown time   • carvedilol (COREG) 25 MG tablet Take 25 mg by  mouth 2 (Two) Times a Day With Meals.   3/22/2017 at Unknown time   • insulin detemir (LEVEMIR) 100 UNIT/ML injection Inject 30 Units under the skin Every Night. 10 mL 1      Assessment/Plan   ASSESSMENT / PLAN      CHF (congestive heart failure) (CMS/[facility])    1.CKD 5 with the worsening creatinine and now fluid overload with the pulmonary edema and anasarca.  Patient creatinine is up to 5.9 his GFR is only 12 mL per minute    I have explained in detail about advance nature of chronic kidney disease.  I have explained the fluid overload and uremia due to his underlying kidney disease and likely needs renal replacement therapy soon.  I have given him options of hemodialysis and peritoneal dialysis and I think he will be a good candidate for peritoneal dialysis.  He understands the need of initiating dialysis and also we may need to start on this admission.  I will give him another day and reassess renal indices tomorrow.    If He has worsening uremia we may have to proceed with hemodialysis here and then peritoneal dialysis later on.  I will give him Bumex 2 mg again today    2.hypertension poorly controlled.  Try to wean the nitro drip.  I will start labetalol  amlodipine and hydralazine.  He is also on Tenex at nighttime.  Agree with 24-hour urine protein along with creatinine clearance.    3.CKD/minimal and bone disorder.  We'll do a renal diet for now.  Check vitamin D and intact PTH.  I'll also get ultrasound of the kidneys    Thank you for the referral continue follow the patient during his hospital stay        I discussed the patients findings and my recommendations with patient, family and primary care team         This document has been electronically signed by Mark Crawley MD on November 15, 2018 12:55 PM

## 2018-11-15 NOTE — PROGRESS NOTES
Discharge Planning Assessment  [facility]     Patient Name: Jamil Olivo  MRN: [id number]  Today's Date: 11/15/2018    Admit Date: 11/14/2018    Discharge Needs Assessment     Row Name 11/15/18 1308       Living Environment    Lives With  spouse    Current Living Arrangements  home/apartment/condo Information on face sheet confirmed with patient.     Primary Care Provided by  self    Provides Primary Care For  no one    Family Caregiver if Needed  spouse    Quality of Family Relationships  helpful;involved    Able to Return to Prior Arrangements  yes       Resource/Environmental Concerns    Resource/Environmental Concerns  none    Transportation Concerns  car, none       Transition Planning    Patient/Family Anticipates Transition to  home with family    Patient/Family Anticipated Services at Transition  none    Transportation Anticipated  family or friend will provide       Discharge Needs Assessment    Readmission Within the Last 30 Days  no previous admission in last 30 days    Concerns to be Addressed  no discharge needs identified;denies needs/concerns at this time    Equipment Currently Used at Home  other (see comments);nebulizer;glucometer scale    Anticipated Changes Related to Illness  none    Equipment Needed After Discharge  none    Current Discharge Risk  chronically ill Patient voiced compliance with CHF and DM management.         Discharge Plan     Row Name 11/15/18 0691       Plan    Plan  home with no services    Plan Comments  LACE complete. Patient denies need for a home health: transition visit @ d/c. Plan is to return home. Continue with medical management....[facility]        Destination      No service coordination in this encounter.      Durable Medical Equipment      No service coordination in this encounter.      Dialysis/Infusion      No service coordination in this encounter.      Home Medical Care      No service coordination in this encounter.      [facility]  Resources      No service coordination in this encounter.          Demographic Summary     Row Name 11/15/18 1303       General Information    Admission Type  inpatient    Arrived From  home    Referral Source  high risk screening    Preferred Language  English     Used During This Interaction  no        Functional Status     Row Name 11/15/18 1303       Functional Status    Usual Activity Tolerance  good    Functional Status Comments  Patient is independent with ADL's.        Functional Status, IADL    Medications  independent [facility] pharmacy and rx coverage confirmed with patient.     Meal Preparation  assistive person    Housekeeping  assistive person    Laundry  assistive person    Shopping  assistive person    IADL Comments  Patient and spouse perform IADL's.       Mental Status Summary    Recent Changes in Mental Status/Cognitive Functioning  no changes        Psychosocial    No documentation.       Abuse/Neglect    No documentation.       Legal    No documentation.       Substance Abuse    No documentation.       Patient Forms    No documentation.           PRISCA Frederick

## 2018-11-15 NOTE — TREATMENT PLAN
Indication: HFrEF  Attached Cardiologist: Dr Yi     Current admission indication: volume overload    After reviewing EHR,  it is felt that this patient is not appropriate for the  HF Program due to noncompliance; ESRD recommended for HD      I would like to thank Dr Jethro Borges  for this HF consultation.           This document has been electronically signed by NICK Jacob on November 15, 2018 1:50 PM

## 2018-11-15 NOTE — PLAN OF CARE
Problem: Patient Care Overview  Goal: Plan of Care Review  Outcome: Ongoing (interventions implemented as appropriate)   11/15/18 1714   Coping/Psychosocial   Plan of Care Reviewed With patient   Plan of Care Review   Progress improving   OTHER   Outcome Summary Pt is no longer on nitro gtt. Home PO meds for blood pressure restarted. Vital signs stable. 24 hour urine collection in process. Will continue to monitor.       Problem: Cardiac: Heart Failure (Adult)  Goal: Signs and Symptoms of Listed Potential Problems Will be Absent, Minimized or Managed (Cardiac: Heart Failure)  Outcome: Ongoing (interventions implemented as appropriate)   11/15/18 1714   Goal/Outcome Evaluation   Problems Assessed (Heart Failure) all   Problems Present (Heart Failure) fluid/electrolyte imbalance;situational response       Problem: Hypertensive Disease/Crisis (Arterial) (Adult)  Goal: Signs and Symptoms of Listed Potential Problems Will be Absent, Minimized or Managed (Hypertensive Disease/Crisis)  Outcome: Ongoing (interventions implemented as appropriate)   11/15/18 1714   Goal/Outcome Evaluation   Problems Assessed (Hypertensive Disease/Crisis (Arterial)) all   Problems Present (Hypertensive Disease) situational response;severe hypertension/hypertensive crisis       Problem: Diabetes, Type 2 (Adult)  Goal: Signs and Symptoms of Listed Potential Problems Will be Absent, Minimized or Managed (Diabetes, Type 2)  Outcome: Ongoing (interventions implemented as appropriate)   11/15/18 1714   Goal/Outcome Evaluation   Problems Assessed (Type 2 Diabetes) all   Problems Present (Type 2 Diabetes) none

## 2018-11-15 NOTE — PROGRESS NOTES
Patient was seen and examined at bedside.  Patient denies any symptoms.  Swelling is better.  Waiting on nephrology and cardiology recommendation.

## 2018-11-15 NOTE — CONSULTS
Adult Nutrition  Assessment    Patient Name:  Jamil Olivo  YOB: 1965  MRN: [id number]  Admit Date:  11/14/2018    Assessment Date:  11/15/2018    Comments:  Patient admitted to the hospital for CHF. Patient has hx of HF, respiratory failure, type 2 DM, and stage 4 CKD. Patient reports good appetite and good intake of food PTA.  Patient reports having a good appetite in the hospital and good intake of meals. Patient and patient's spouse were educated on low-sodium diet for after patient gets out of the hospital. Educated on the importance of eating foods low in sodium to help protect the heart and reduce fluid retention. Gave handout on low-sodium diet. Patient and patient's spouse verbalized understanding. RDN staff will continue to monitor patient as he is about to start dialysis.   Reason for Assessment     Row Name 11/15/18 1400          Reason for Assessment    Reason For Assessment  per organizational policy  (Pended)      Diagnosis  cardiac disease  (Pended)          Nutrition/Diet History     Row Name 11/15/18 1400          Nutrition/Diet History    Typical Food/Fluid Intake  Patient has a history of CHF, respiratory failure, type 2 DM, and stage 4 CKD. Patient reports history of good appetite and good intake of food.  Patient reports having a good appetite in the hospital and good intake of meals.   (Pended)            Labs/Tests/Procedures/Meds     Row Name 11/15/18 1410          Labs/Procedures/Meds    Lab Results Reviewed  reviewed, pertinent  (Pended)      Lab Results Comments  Glucose-126, BUN-91, Cr-5.90,   (Pended)         Diagnostic Tests/Procedures    Diagnostic Test/Procedure Reviewed  reviewed  (Pended)         Medications    Pertinent Medications Reviewed  reviewed  (Pended)          Physical Findings     Row Name 11/15/18 1411          Physical Findings    Overall Physical Appearance  overweight  (Pended)          Estimated/Assessed Needs     Row Name 11/15/18 1411           Calculation Measurements    Weight Used For Calculations  85 kg (187 lb 8 oz)  (Pended)         Estimated/Assessed Needs    Additional Documentation  [place]-St. Jeor Equation (Group);Calorie Requirements (Group);Fluid Requirements (Group);KCAL/KG (Group);Protein Requirements (Group)  (Pended)         Calorie Requirements    Weight Used For Calorie Calculations  105 kg (231 lb 7.7 oz)  (Pended)      Estimated Calorie Requirement (kcal/day)  2100  (Pended)         KCAL/KG    14 Kcal/Kg (kcal)  1190.69  (Pended)      15 Kcal/Kg (kcal)  1275.73  (Pended)      18 Kcal/Kg (kcal)  1530.88  (Pended)      20 Kcal/Kg (kcal)  1700.98  (Pended)      25 Kcal/Kg (kcal)  2126.22  (Pended)      30 Kcal/Kg (kcal)  2551.47  (Pended)      35 Kcal/Kg (kcal)  2976.72  (Pended)      40 Kcal/Kg (kcal)  3401.96  (Pended)      45 Kcal/Kg (kcal)  3827.2  (Pended)      50 Kcal/Kg (kcal)  4252.45  (Pended)         [place]-St. Jeor Equation    RMR ([place]-St. Jeor Equation)  1712.62  (Pended)         Protein Requirements    Weight Used For Protein Calculations  85 kg (187 lb 8 oz)  (Pended)      Est Protein Requirement Amount (gms/kg)  1.0 gm protein  (Pended)      Estimated Protein Requirements (gms/day)  85.05  (Pended)         Fluid Requirements    Estimated Fluid Requirements (mL/day)  2100  (Pended)      RDA Method (mL)  2100  (Pended)      Jojo-John Method (over 20 kg)  3200.98  (Pended)          Nutrition Prescription Ordered     Row Name 11/15/18 1415          Nutrition Prescription PO    Current PO Diet  Regular  (Pended)      Common Modifiers  Cardiac;Consistent Carbohydrate;Renal  (Pended)          Evaluation of Received Nutrient/Fluid Intake     Row Name 11/15/18 1416 11/15/18 1411       Calculation Measurements    Weight Used For Calculations  --  85 kg (187 lb 8 oz)  (Pended)        PO Evaluation    Number of Days PO Intake Evaluated  1 day  (Pended)   --    Number of Meals  1  (Pended)   --    % PO Intake  75%  (Pended)    --        Evaluation of Prescribed Nutrient/Fluid Intake     Row Name 11/15/18 1411          Calculation Measurements    Weight Used For Calculations  85 kg (187 lb 8 oz)  (Pended)              Electronically signed by:  Jackie Huggins  11/15/18 2:16 PM

## 2018-11-15 NOTE — PLAN OF CARE
Problem: Patient Care Overview  Goal: Plan of Care Review  Outcome: Ongoing (interventions implemented as appropriate)   11/15/18 3141   Coping/Psychosocial   Plan of Care Reviewed With patient;spouse   Plan of Care Review   Progress improving   OTHER   Outcome Summary Patient and his wife were given education on the low-sodium diet for CHF. Were given handouts on low-sodium diet. Patient and his wife verbalized understanding.

## 2018-11-15 NOTE — NURSING NOTE
Notified Dr Borges of Pt B/P 147/72 and 158/85, Ordered to continue to monitor BP, but hold Nitro gtt for now

## 2018-11-15 NOTE — H&P
"      [facility] Admission      Date of Admission: 11/14/2018      Primary Care Physician: John Hyde MD    Chief compliant: \"My legs were swollen.\"    HPI: Patient is a 53-year-old black male that has a concurrent health history significant for hypertension, insulin dependent diabetes mellitus type 2, Ckd  secondary to diabetes, chronic systolic heart failure with an estimated EF of 25%.  The patient is a  transfer from [facility] for evaluation of acute on chronic systolic heart failure with worsening edema in the feet ankles extending up the extremities.  The patient also was noted to have a creatinine of 5.7 with a baseline of around 2.8.  His troponin was elevated 0.094.  The patient was given 60 mg of IV Lasix and as the ER physician stated his urine output was poor.  The patient also was noted to have a systolic blood pressure of greater than 200 and diastolic 130.  Patient was given hydralazine and labetalol which brought his blood pressures down 146/80.  The patient is present with out evidence of any chest pain.  The patient has mild shortness of breath.  He reports that his swelling has worsened over the last couple of days.  The patient reports that at home he takes metolazone and furosemide.  The patient will be admitted for further evaluation.      Past Medical History:   Past Medical History:   Diagnosis Date   • Asthma    • CHF (congestive heart failure) (CMS/[facility])    • Diabetes mellitus (CMS/[facility])    • Diabetic neuropathy (CMS/HCC) 2014   • Hypertension    • Kidney disease 2018    pt states that he is close to needing to be on dialysis       Past Surgical History:   Past Surgical History:   Procedure Laterality Date   • EYE SURGERY Bilateral 12/2017       Family History:   Family History   Problem Relation Age of Onset   • Hypertension Mother    • Diabetes Mother    • Hypertension Father    • Diabetes Father    • Asthma Maternal Grandfather  "       Social History:   Social History     Socioeconomic History   • Marital status:      Spouse name: Not on file   • Number of children: Not on file   • Years of education: Not on file   • Highest education level: Not on file   Tobacco Use   • Smoking status: Never Smoker   • Smokeless tobacco: Never Used   Substance and Sexual Activity   • Alcohol use: No   • Drug use: No   • Sexual activity: Defer       Allergies:   Allergies   Allergen Reactions   • Motrin [Ibuprofen] Hives       Medications:   Prior to Admission medications    Medication Sig Start Date End Date Taking? Authorizing Provider   acetaminophen (TYLENOL) 325 MG tablet Take 2 tablets by mouth Every 6 (Six) Hours As Needed for Mild Pain (1-3) or Headache. 3/26/17  Yes Kee Crockett MD   albuterol (PROVENTIL HFA;VENTOLIN HFA) 108 (90 BASE) MCG/ACT inhaler Inhale 2 puffs Every 4 (Four) Hours As Needed for Wheezing. 3/26/17  Yes Kee Crockett MD   furosemide (LASIX) 40 MG tablet Take 40 mg by mouth 2 (Two) Times a Day.   Yes Akosua Fair MD   guanFACINE (TENEX) 1 MG tablet Take 1 mg by mouth Every Night.   Yes Akosua Fair MD   hydrALAZINE (APRESOLINE) 50 MG tablet Take 75 mg by mouth 3 (Three) Times a Day.   Yes Akosua Fair MD   insulin glargine (LANTUS) 100 UNIT/ML injection Inject 30 Units under the skin into the appropriate area as directed Every Night.   Yes Akosua Fair MD   isosorbide mononitrate (IMDUR) 60 MG 24 hr tablet Take 1 tablet by mouth Daily.  Patient taking differently: Take 10 mg by mouth 3 (Three) Times a Day. 3/26/17  Yes Kee Crockett MD   labetalol (NORMODYNE) 100 MG tablet Take 50 mg by mouth 3 (Three) Times a Day.   Yes Akosua Fair MD   metOLazone (ZAROXOLYN) 10 MG tablet Take 10 mg by mouth Daily.   Yes Akosua Fair MD   amLODIPine (NORVASC) 10 MG tablet Take 10 mg by mouth Daily.    Akosua Fair MD   benazepril (LOTENSIN) 20 MG tablet Take 40 mg by  mouth Daily.    Provider, MD Akosua   carvedilol (COREG) 25 MG tablet Take 25 mg by mouth 2 (Two) Times a Day With Meals.    ProviderAkosua MD   insulin detemir (LEVEMIR) 100 UNIT/ML injection Inject 30 Units under the skin Every Night. 3/26/17   Kee Crockett MD       Review of Systems:    Constitutional: Negative for activity change, appetite change, chills.  HENT: Negative for congestion, ear discharge, ear pain, hearing loss, rhinorrhea, sneezing, sore throat and trouble swallowing.    Eyes: Negative for photophobia, pain, discharge and visual disturbance.   Respiratory: Negative for cough, wheezing.    Cardiovascular: Negative for chest pain and palpitations.  positive for swelling of his lower extremities.  Gastrointestinal: Negative for abdominal pain, blood in stool, diarrhea, nausea and vomiting.   Endocrine: Negative for polydipsia and polyuria.   Genitourinary: Negative for difficulty urinating, dysuria.  Skin: Negative for rash.  negative for itching.  Neurological: Negative for dizziness, syncope, weakness.      Physical Exam:    Temp:  [97.6 °F (36.4 °C)] 97.6 °F (36.4 °C)  Heart Rate:  [93-95] 94  Resp:  [20] 20  BP: (158-185)/(106-110) 158/106    General:Patient is oriented to person, place, and time. Patient appears well-developed and well-nourished. No distress.   HEENT: Head: Normocephalic and atraumatic. Right Ear: External ear normal.   Left Ear: External ear normal.  Eyes: Conjunctivae and EOM are normal. Right eye exhibits no discharge. Left eye exhibits no discharge.   Neck: Normal range of motion. Neck supple. No JVD present. No tracheal deviation present. No thyromegaly present.   Heart: Normal rate, regular rhythm, normal heart sounds and intact distal pulses.  Exam reveals no gallop and no friction rub. No murmur heard.  Pulmonary: Effort normal and breath sounds normal.  The patient has mild crackles noted at the bases bilaterally.    Abdominal: Soft, Bowel sounds are  normal. No distension and no mass. There is no tenderness. There is no rebound and no guarding. No hernia.   Musculoskeletal: Patient has 2+ pitting edema in the feet,ankles extending up the extremities to the level of the proximal calf bilaterally.    Neurological: Patient is alert and oriented to person, place, and time.    Skin: Skin is warm. No rash noted. Patient is not diaphoretic.    Nursing note and vitals reviewed.    Results Reviewed:  I have personally reviewed current lab, radiology, and data and agree with results.  Lab Results (last 24 hours)     ** No results found for the last 24 hours. **        Imaging Results (last 24 hours)     ** No results found for the last 24 hours. **          Assessment/Plan         Active Hospital Problems    Diagnosis   • CHF (congestive heart failure) (CMS/[facility])       Assessment / Plan:  Acute on chronic systolic heart failure with exacerbation - the patient has history of a EF of 25%.  He is established with the cardiology group.  After speaking to the ER physician at the prior hospital he received 60 mg of IV Lasix but did not have much output.  Reviewing the prior blood work the patient had a creatinine of 5.7.  I spoke to nephrology and will start Bumex IV once.  Though with his severely elevated blood pressures, we'll start a nitro drip.  If the patient continues to have worsening renal function and poor output with Bumex, will consider hemodialysis.    Hypertensive crisis - will add a nitro drip and continue to monitor.    Severe proteinuria- I believe that this is secondary to his diabetes, and renal function.  We'll check a 24-hour urine protein.     Anemia unspecified - Is likely chronic, we'll continue to monitor.    Acute renal failure - as above, we'll continue to monitor.  Will consult nephrology for further management.    Acute NSTEMI - Will trend troponin, and check a stat EKG.  I believe that this is secondary to acute to failure. The patient has a  severely elevated renal function.  We'll consult cardiology.    Insulin-dependent diabetes mellitus type 2 - Evening insulin and sliding scale.        Ethics - Patient wishes be full code    dvt Prophylaxis - heparin      EMR Dragon/Transcription disclaimer:   Much of this encounter note is an electronic transcription/translation of spoken language to printed text. The electronic translation of spoken language may permit erroneous, or at times, nonsensical words or phrases to be inadvertently transcribed; Although I have reviewed the note for such errors, some may still exist.              This document has been electronically signed by Jethro Borges DO on November 15, 2018 1:11 AM

## 2018-11-15 NOTE — CONSULTS
Cardiology Consultation Note.        Patient Name: Jamil Olivo  Age/Sex: 53 y.o. male  : 1965  MRN: [id number]    Date of consultation: 11/15/2018  Consulting Physician: Robin Yi MD  Primary care Physician: John Hyde MD  Requesting Physician:   Jethro Borges DO Reason for consultation:  Indeterminate troponin shortness of breath      Subjective:       Chief Complaint: Shortness of breath    History of Present Illness:  Jamil Olivo is a 53 y.o. male     Body mass index is 32.36 kg/m². with a past medical history significant for arterial hypertension, hypertensive heart disease, non-insulin-dependent diabetes, chronic kidney disease secondary to diabetes, history of congestive heart failure, left ventricular systolic dysfunction with an ejection fraction of 25%, mild-to-moderate tricuspid regurgitation, obesity.  patient had undergone a nuclear Cardiolite stress test in 2017 which was negative for any evidence of any stress-induced ischemia.    Review of the record indicated patient on previous hospitalization had symptoms of chest pain with positive troponin suggestive for non-Q myocardial infarction and was found to have left ventricular systolic dysfunction but did not undergo any invasive evaluation as the patient had chronic kidney disease but was not on hemodialysis.    Patient now presented to [facility] with increasing shortness of breath and had positive troponin.  Patient was evaluated by nephrology for elevated creatinine and is to undergo hemodialysis.  Patient on questioning has had increasing episodes of shortness of breath.  Patient denies any symptoms of severe substernal chest pain.  Patient denies any symptoms of paroxysmal nocturnal dyspnea or orthopnea.  Patient denies excessive intake of salt.  Patient denies any episodes of any bleeding diastasis.    Patient 10 point review of system except for what is stated in the history of  present illness is negative.          Past Medical History:  1.   Negative Lexiscan Cardiolite stress test for any evidence of any stress-induced ischemia.  2.  Left ventricular systolic dysfunction with an ejection fraction of 20-25%.  3.  Mild-to-moderate tricuspid regurgitation.  4.  Arterial hypertension.  5.  Hypertensive heart disease.  6.  Insulin-requiring diabetes.  7.  Diabetic nephropathy.  8.  Obesity  9.  Chronic kidney disease with a creatinine of 5.9.  10.  Asthmatic bronchitis          Past Surgical History:  1.  Ophthalmic surgery.      Family History:No history of premature atherosclerotic coronary artery disease          Social History: No history of tobacco or alcohol intake.       Cardiac Risk Factors:   1. Male   2. Arterial Hypertension  3. Diabetes  4. Obesity         Allergies:  Allergies   Allergen Reactions   • Motrin [Ibuprofen] Hives       Medication:  Medications Prior to Admission   Medication Sig Dispense Refill Last Dose   • acetaminophen (TYLENOL) 325 MG tablet Take 2 tablets by mouth Every 6 (Six) Hours As Needed for Mild Pain (1-3) or Headache. 1 bottle 1    • albuterol (PROVENTIL HFA;VENTOLIN HFA) 108 (90 BASE) MCG/ACT inhaler Inhale 2 puffs Every 4 (Four) Hours As Needed for Wheezing. 1 inhaler 1    • furosemide (LASIX) 40 MG tablet Take 40 mg by mouth 2 (Two) Times a Day.   3/22/2017 at Unknown time   • guanFACINE (TENEX) 1 MG tablet Take 1 mg by mouth Every Night.      • hydrALAZINE (APRESOLINE) 50 MG tablet Take 75 mg by mouth 3 (Three) Times a Day.      • insulin glargine (LANTUS) 100 UNIT/ML injection Inject 30 Units under the skin into the appropriate area as directed Every Night.      • isosorbide mononitrate (IMDUR) 60 MG 24 hr tablet Take 1 tablet by mouth Daily. (Patient taking differently: Take 10 mg by mouth 3 (Three) Times a Day.) 30 tablet 1    • labetalol (NORMODYNE) 100 MG tablet Take 50 mg by mouth 3 (Three) Times a Day.      • metOLazone (ZAROXOLYN) 10 MG  tablet Take 10 mg by mouth Daily.      • amLODIPine (NORVASC) 10 MG tablet Take 10 mg by mouth Daily.   3/22/2017 at Unknown time   • benazepril (LOTENSIN) 20 MG tablet Take 40 mg by mouth Daily.   3/22/2017 at Unknown time   • carvedilol (COREG) 25 MG tablet Take 25 mg by mouth 2 (Two) Times a Day With Meals.   3/22/2017 at Unknown time   • insulin detemir (LEVEMIR) 100 UNIT/ML injection Inject 30 Units under the skin Every Night. 10 mL 1            Review of Systems:       Constitutional:  Denies recent weight loss, weight gain, fever or chills, no change in exercise tolerance.     HENT:  Denies any hearing loss, epistaxis, hoarseness, or difficulty speaking.     Eyes: Wears eyeglasses or contact lenses     Respiratory:  Denies dyspnea with exertion,no cough, wheezing, or hemoptysis.     Cardiovascular: Positive for shortness of breath .Negative for palpitations, chest pain, orthopnea, PND, peripheral edema, syncope, or claudication.     Gastrointestinal:  Denies change in bowel habits, dyspepsia, ulcer disease, hematochezia, or melena.  No nausea, no vomiting, no hematemesis, no diarrhea or constipation.    Endocrine: Negative for cold intolerance, heat intolerance, polydipsia, polyphagia or polyuria. Denies any history of weight change or unintended weight loss.    Genitourinary: Negative for hematuria.  No frequent urination or nocturia.      Musculoskeletal: Denies any history of arthritic symptoms or back problems .  No joint pain, joint stiffness, joint swelling, muscle pain, muscle weakness or neck pain.    Skin:  Denies any change in hair or nails, rashes, or skin lesions.     Allergic/Immunologic: Negative.  Negative for environmental allergies, food allergies or immunocompromised state.     Neurological:  Denies any history of recurrent headaches, strokes, TIA, or seizure disorder.     Hematological: Denies excessive bleeding, easy bruising, fatigue, lymphadenopathy or petechiae or any bleeding  disorders.     Psychiatric/Behavioral: Denies any history of depression, substance abuse, or change in cognitive function. Denies any psychomotor reaction or tangential thought.  No depression, homicidal ideations or suicidal ideations.          Objective:     Objective:  Temp:  [97.6 °F (36.4 °C)-98.5 °F (36.9 °C)] 98 °F (36.7 °C)  Heart Rate:  [] 113  Resp:  [18-20] 18  BP: (133-185)/() 153/108      Body mass index is 32.36 kg/m².           Physical Exam:   General Appearance:    Alert, oriented, cooperative, in no acute distress.   Head:    Normocephalic, atraumatic, without obvious abnormality.   Eyes:           DAIN.  Lids and lashes normal, conjunctivae and sclerae normal, no icterus, no pallor.   Ears:    Ears appear intact with no abnormalities noted.   Throat:   Mucous membranes pink and moist.   Neck:   Supple, trachea midline, no carotid bruit, no organomegaly or JVD.   Lungs:     Clear to auscultation and percussion, respirations regular, even and unlabored. No wheezes, rales or rhonchi.    Heart:    Regular rhythm and normal rate, normal S1 and S2, no            murmur, no gallop, no rub, no click.   Abdomen:     Soft, non-tender, non-distended, no guarding, no rebound tenderness, normal bowel sounds in all four quadrants, no masses, liver and spleen nonpalpable.   Genitalia:    Deferred.   Extremities:   Moves all extremities well, no edema, no cyanosis, no              redness, no clubbing.   Pulses:   Pulses palpable and equal bilaterally.   Skin:   Moist and warm. No bleeding, bruising or rash.   Neurologic/Psychiatric:   Alert and oriented to person, place, and time.  Motor, power and tone in upper and lower extremities are grossly intact. No focal neurological deficits. Normal cognitive function. No psychomotor reaction or tangential thought. No depression, homicidal ideations and suicidal ideations.       Medication Review:   Current Facility-Administered Medications   Medication  Dose Route Frequency Provider Last Rate Last Dose   • acetaminophen (TYLENOL) tablet 650 mg  650 mg Oral Q6H PRN Jethro Borges DO       • amLODIPine (NORVASC) tablet 10 mg  10 mg Oral Q24H Mark Crawley MD   10 mg at 11/15/18 1554   • dextrose (D50W) 25 g/ 50mL Intravenous Solution 25 g  25 g Intravenous Q15 Min PRN Jethro Borges DO       • dextrose (GLUTOSE) oral gel 15 g  15 g Oral Q15 Min PRN Jethro Borges DO       • famotidine (PEPCID) tablet 40 mg  40 mg Oral Daily Jethro Borges DO       • glucagon (human recombinant) (GLUCAGEN DIAGNOSTIC) injection 1 mg  1 mg Subcutaneous PRN Jethro Borges DO       • guanFACINE (TENEX) tablet 1 mg  1 mg Oral Nightly Jethro Borges DO   1 mg at 11/15/18 0136   • heparin (porcine) 5000 UNIT/ML injection 5,000 Units  5,000 Units Subcutaneous Q8H Jethro Borges DO   5,000 Units at 11/15/18 1400   • hydrALAZINE (APRESOLINE) tablet 25 mg  25 mg Oral Q8H Mark Crawley MD   25 mg at 11/15/18 1555   • insulin aspart (novoLOG) injection 0-14 Units  0-14 Units Subcutaneous 4x Daily AC & at Bedtime Jethro Borges DO   3 Units at 11/15/18 1702   • insulin detemir (LEVEMIR) injection 20 Units  20 Units Subcutaneous Nightly Jethro Borges DO   20 Units at 11/15/18 0137   • labetalol (NORMODYNE) tablet 200 mg  200 mg Oral Q12H Mark Crawley MD   200 mg at 11/15/18 1404   • nitroglycerin 50 mg/250 mL (0.2 mg/mL) infusion  10-50 mcg/min Intravenous Titrated Jethro Borges DO   Stopped at 11/15/18 1557   • sodium chloride 0.9 % flush 3 mL  3 mL Intravenous Q12H Jethro Borges DO   3 mL at 11/15/18 0925   • sodium chloride 0.9 % flush 3-10 mL  3-10 mL Intravenous PRN Jethro Borges DO           Lab Review:     Results from last 7 days   Lab Units  11/15/18   0517   SODIUM mmol/L  138   POTASSIUM mmol/L  4.0   CHLORIDE mmol/L  103   CO2 mmol/L  24.0   BUN mg/dL  91*   CREATININE mg/dL  5.90*    CALCIUM mg/dL  8.9   GLUCOSE mg/dL  113*     Results from last 7 days   Lab Units  11/15/18   0704  11/15/18   0517  11/15/18   0141   TROPONIN I ng/mL  0.081*  0.098*  0.092*         Results from last 7 days   Lab Units  11/15/18   0141   WBC 10*3/mm3  10.20*   HEMOGLOBIN g/dL  11.5*   HEMATOCRIT %  34.5*   PLATELETS 10*3/mm3  315         Results from last 7 days   Lab Units  11/15/18   0517   MAGNESIUM mg/dL  2.5*                   EKG:   ECG/EMG Results (last 24 hours)     ** No results found for the last 24 hours. **          ECHO:  Results for orders placed during the hospital encounter of 03/23/17   STAT Adult Transthoracic Echo Complete    Narrative · The left ventricular cavity is moderately dilated.  · Left ventricular wall thickness is consistent with a thin walled   ventricle.  · Left ventricular function is severely decreased.  · Mild to moderate tricuspid valve regurgitation is present.  · Mild mitral valve regurgitation is present  · Left atrial cavity size is borderline dilated.           Imaging:  Imaging Results (last 24 hours)     Procedure Component Value Units Date/Time    US Renal Bilateral [[id number]] Collected:  11/15/18 1306     Updated:  11/15/18 1431    Narrative:        PROCEDURE: Complete retroperitoneal sonogram    COMPARISON: No comparison    HISTORY: Chronic kidney disease stage 4-5    FINDINGS: Realtime sonographic images are obtained of the kidneys  and bladder. The kidneys are normal in size. The kidneys are  isoechoic or even slightly hyperechoic compared to the liver and  probably also isoechoic to spleen. No hydronephrosis or shadowing  stones. The right kidney measures 11.8 cm in length, the left  kidney 12.4 cm in length.    The bladder is well-distended. There is mild bladder wall  thickening.       Impression:       1. There is suggestion of mildly increased renal cortical  echogenicity when compared to the spleen and liver. This suggests  sequela of renal parenchymal  disease. No hydronephrosis.  2. Question of mildly increased echogenicity of the liver which  may be related to diffuse hepatic steatosis.  3. Mild bladder wall thickening. This could be related to chronic  bladder outlet obstruction. Acute cystitis would be a less likely  possibility.    Electronically signed by:  Phillip Monae MD  11/15/2018 2:29  PM CST Workstation: [id number]    XR Chest 1 View [[id number]] Collected:  11/15/18 0130     Updated:  11/15/18 0148    Narrative:       Exam: AP portable chest    INDICATION: CHF.    FINDINGS: AP portable chest. The bony structures are intact. The  heart is enlarged. Mild venous congestion. No infiltrate,  pneumothorax or pleural effusion      Impression:       Cardiomegaly with mild venous congestion    Electronically signed by:  Reji Avelar MD  11/15/2018 1:47 AM  CST Workstation: Send the Trend          I personally viewed and interpreted the patient's EKG/Telemetry data.    Assessment:   1.  Indeterminate troponin with symptoms of shortness of breath with left ventricular systolic dysfunction with an ejection fraction of 20-25%.  2.  Chronic kidney disease.  3.  Arterial hypertension uncontrolled.          Plan:   1.  Indeterminate troponin with left ventricular systolic dysfunction with an ejection fraction of 20-25%.  Patient on previous hospitalization had elevated troponin and symptoms of chest pain.  Patient did not undergo any invasive evaluation for the left ventricular systolic dysfunction secondary to the chronic kidney disease.  Patient had undergone noninvasive evaluation a year ago with a nuclear stress test which was negative.  Due to the patient's left ventricular systolic dysfunction patient at the present time has been informed for the need for a coronary angiogram to rule out underlying coronary artery disease as the etiology and to rule out ischemic cardiomyopathy.  Risk-benefit treatment options for the coronary angiogram were  discussed with the patient and an informed consent was obtained.  Patient Mallampati score #2 patient ASA classification was #2.  Risk for minimal sedation was also discussed with the patient.  Plan was to proceed with a coronary angiogram prior to the hemodialysis.  2.  Labile arterial hypertension.  Patient would be continued on the present dose of the antihypertensive medication.  3.  Left ventricular systolic dysfunction with an ejection fraction of 20-25%.  Patient would undergo hemodialysis after the coronary angiogram.  Patient would be continued on afterload reduction medication once the patient is on hemodialysis for better blood pressure control.  4.  Chronic kidney disease.  Patient was evaluated by nephrology and is to undergo hemodialysis.    Thank you for the consultation.    The above plan of management were discussed with the patient and the family.        Time: time spent in face-to-face evaluation of greater than 55  minutes and interacting and formulating examining and discussing the plan with the patient with 50% of greater time spent in face-to-face interaction.    Robin Yi MD  11/15/18  5:50 PM  Dictated utilizing Dragon dictation.

## 2018-11-15 NOTE — NURSING NOTE
Notified Dr Borges of Pt bumped troponin and B/P elevated, Pt asymptomatic, Ordered to start Nitro gtt

## 2018-11-15 NOTE — PLAN OF CARE
Problem: Patient Care Overview  Goal: Plan of Care Review  Outcome: Ongoing (interventions implemented as appropriate)   11/15/18 0431   Coping/Psychosocial   Plan of Care Reviewed With patient;spouse   Plan of Care Review   Progress no change   OTHER   Outcome Summary New admit       Problem: Cardiac: Heart Failure (Adult)  Goal: Signs and Symptoms of Listed Potential Problems Will be Absent, Minimized or Managed (Cardiac: Heart Failure)  Outcome: Ongoing (interventions implemented as appropriate)   11/15/18 0431   Goal/Outcome Evaluation   Problems Assessed (Heart Failure) all   Problems Present (Heart Failure) cardiac pump dysfunction;situational response       Problem: Hypertensive Disease/Crisis (Arterial) (Adult)  Goal: Signs and Symptoms of Listed Potential Problems Will be Absent, Minimized or Managed (Hypertensive Disease/Crisis)  Outcome: Ongoing (interventions implemented as appropriate)      Problem: Diabetes, Type 2 (Adult)  Goal: Signs and Symptoms of Listed Potential Problems Will be Absent, Minimized or Managed (Diabetes, Type 2)  Outcome: Ongoing (interventions implemented as appropriate)

## 2018-11-16 ENCOUNTER — APPOINTMENT (OUTPATIENT)
Dept: INTERVENTIONAL RADIOLOGY/VASCULAR | Facility: HOSPITAL | Age: 53
End: 2018-11-16

## 2018-11-16 ENCOUNTER — APPOINTMENT (OUTPATIENT)
Dept: ULTRASOUND IMAGING | Facility: HOSPITAL | Age: 53
End: 2018-11-16

## 2018-11-16 PROBLEM — I50.9 CONGESTIVE HEART FAILURE: Status: ACTIVE | Noted: 2018-11-14

## 2018-11-16 LAB
25(OH)D3 SERPL-MCNC: <12.8 NG/ML (ref 30–100)
ALBUMIN SERPL-MCNC: 2.9 G/DL (ref 3.4–4.8)
ANION GAP SERPL CALCULATED.3IONS-SCNC: 11 MMOL/L (ref 5–15)
ANION GAP SERPL CALCULATED.3IONS-SCNC: 12 MMOL/L (ref 5–15)
BASOPHILS # BLD AUTO: 0.01 10*3/MM3 (ref 0–0.2)
BASOPHILS # BLD AUTO: 0.02 10*3/MM3 (ref 0–0.2)
BASOPHILS NFR BLD AUTO: 0.1 % (ref 0–2)
BASOPHILS NFR BLD AUTO: 0.2 % (ref 0–2)
BUN BLD-MCNC: 94 MG/DL (ref 7–21)
BUN BLD-MCNC: 97 MG/DL (ref 7–21)
BUN/CREAT SERPL: 14.8 (ref 7–25)
BUN/CREAT SERPL: 16.4 (ref 7–25)
CALCIUM SPEC-SCNC: 8.2 MG/DL (ref 8.4–10.2)
CALCIUM SPEC-SCNC: 8.4 MG/DL (ref 8.4–10.2)
CHLORIDE SERPL-SCNC: 100 MMOL/L (ref 95–110)
CHLORIDE SERPL-SCNC: 100 MMOL/L (ref 95–110)
CO2 SERPL-SCNC: 24 MMOL/L (ref 22–31)
CO2 SERPL-SCNC: 26 MMOL/L (ref 22–31)
COLLECT DURATION TIME UR: 24 HRS
COLLECT DURATION TIME UR: 24 HRS
CREAT BLD-MCNC: 5.9 MG/DL (ref 0.7–1.3)
CREAT BLD-MCNC: 6.33 MG/DL (ref 0.7–1.3)
CREAT UR-MCNC: 58.5 MG/DL
CREATINE 24H UR-MRATE: 1.11 G/24 HR (ref 0.8–2.8)
DEPRECATED RDW RBC AUTO: 42.9 FL (ref 35.1–43.9)
DEPRECATED RDW RBC AUTO: 43.4 FL (ref 35.1–43.9)
EOSINOPHIL # BLD AUTO: 0.8 10*3/MM3 (ref 0–0.7)
EOSINOPHIL # BLD AUTO: 0.8 10*3/MM3 (ref 0–0.7)
EOSINOPHIL NFR BLD AUTO: 11.4 % (ref 0–7)
EOSINOPHIL NFR BLD AUTO: 9.7 % (ref 0–7)
ERYTHROCYTE [DISTWIDTH] IN BLOOD BY AUTOMATED COUNT: 13.5 % (ref 11.5–14.5)
ERYTHROCYTE [DISTWIDTH] IN BLOOD BY AUTOMATED COUNT: 13.7 % (ref 11.5–14.5)
GFR SERPL CREATININE-BSD FRML MDRD: 11 ML/MIN/1.73 (ref 56–130)
GFR SERPL CREATININE-BSD FRML MDRD: 12 ML/MIN/1.73 (ref 56–130)
GFR SERPL CREATININE-BSD FRML MDRD: ABNORMAL ML/MIN/1.73 (ref 56–130)
GFR SERPL CREATININE-BSD FRML MDRD: ABNORMAL ML/MIN/1.73 (ref 56–130)
GLUCOSE BLD-MCNC: 60 MG/DL (ref 60–100)
GLUCOSE BLD-MCNC: 64 MG/DL (ref 60–100)
GLUCOSE BLDC GLUCOMTR-MCNC: 199 MG/DL (ref 70–130)
GLUCOSE BLDC GLUCOMTR-MCNC: 64 MG/DL (ref 70–130)
GLUCOSE BLDC GLUCOMTR-MCNC: 74 MG/DL (ref 70–130)
HBV SURFACE AG SERPL QL IA: NEGATIVE
HCT VFR BLD AUTO: 30.9 % (ref 39–49)
HCT VFR BLD AUTO: 32.2 % (ref 39–49)
HGB BLD-MCNC: 10.3 G/DL (ref 13.7–17.3)
HGB BLD-MCNC: 10.7 G/DL (ref 13.7–17.3)
IMM GRANULOCYTES # BLD: 0.01 10*3/MM3 (ref 0–0.02)
IMM GRANULOCYTES # BLD: 0.02 10*3/MM3 (ref 0–0.02)
IMM GRANULOCYTES NFR BLD: 0.1 % (ref 0–0.5)
IMM GRANULOCYTES NFR BLD: 0.2 % (ref 0–0.5)
INR PPP: 1.14 (ref 0.8–1.2)
LYMPHOCYTES # BLD AUTO: 0.65 10*3/MM3 (ref 0.6–4.2)
LYMPHOCYTES # BLD AUTO: 0.79 10*3/MM3 (ref 0.6–4.2)
LYMPHOCYTES NFR BLD AUTO: 11.3 % (ref 10–50)
LYMPHOCYTES NFR BLD AUTO: 7.9 % (ref 10–50)
MCH RBC QN AUTO: 29.1 PG (ref 26.5–34)
MCH RBC QN AUTO: 29.2 PG (ref 26.5–34)
MCHC RBC AUTO-ENTMCNC: 33.2 G/DL (ref 31.5–36.3)
MCHC RBC AUTO-ENTMCNC: 33.3 G/DL (ref 31.5–36.3)
MCV RBC AUTO: 87.5 FL (ref 80–98)
MCV RBC AUTO: 87.5 FL (ref 80–98)
MONOCYTES # BLD AUTO: 0.63 10*3/MM3 (ref 0–0.9)
MONOCYTES # BLD AUTO: 0.65 10*3/MM3 (ref 0–0.9)
MONOCYTES NFR BLD AUTO: 7.7 % (ref 0–12)
MONOCYTES NFR BLD AUTO: 9.3 % (ref 0–12)
NEUTROPHILS # BLD AUTO: 4.75 10*3/MM3 (ref 2–8.6)
NEUTROPHILS # BLD AUTO: 6.09 10*3/MM3 (ref 2–8.6)
NEUTROPHILS NFR BLD AUTO: 67.8 % (ref 37–80)
NEUTROPHILS NFR BLD AUTO: 74.3 % (ref 37–80)
NRBC BLD MANUAL-RTO: 0 /100 WBC (ref 0–0)
PHOSPHATE SERPL-MCNC: 6.1 MG/DL (ref 2.4–4.4)
PLATELET # BLD AUTO: 298 10*3/MM3 (ref 150–450)
PLATELET # BLD AUTO: 302 10*3/MM3 (ref 150–450)
PMV BLD AUTO: 9.1 FL (ref 8–12)
PMV BLD AUTO: 9.1 FL (ref 8–12)
POTASSIUM BLD-SCNC: 3.8 MMOL/L (ref 3.5–5.1)
POTASSIUM BLD-SCNC: 4.1 MMOL/L (ref 3.5–5.1)
PROT 24H UR-MRATE: ABNORMAL MG/24HOURS (ref 0–230)
PROT UR-MCNC: 684.8 MG/DL
PROTHROMBIN TIME: 14.3 SECONDS (ref 11.1–15.3)
PTH-INTACT SERPL-MCNC: 510.2 PG/ML (ref 10–65)
RBC # BLD AUTO: 3.53 10*6/MM3 (ref 4.37–5.74)
RBC # BLD AUTO: 3.68 10*6/MM3 (ref 4.37–5.74)
SODIUM BLD-SCNC: 136 MMOL/L (ref 137–145)
SODIUM BLD-SCNC: 137 MMOL/L (ref 137–145)
SPECIMEN VOL 24H UR: 1900 ML
SPECIMEN VOL 24H UR: 1900 ML
WBC NRBC COR # BLD: 7.01 10*3/MM3 (ref 3.2–9.8)
WBC NRBC COR # BLD: 8.21 10*3/MM3 (ref 3.2–9.8)

## 2018-11-16 PROCEDURE — C1894 INTRO/SHEATH, NON-LASER: HCPCS | Performed by: INTERNAL MEDICINE

## 2018-11-16 PROCEDURE — 63710000001 INSULIN ASPART PER 5 UNITS: Performed by: FAMILY MEDICINE

## 2018-11-16 PROCEDURE — 82962 GLUCOSE BLOOD TEST: CPT

## 2018-11-16 PROCEDURE — 82570 ASSAY OF URINE CREATININE: CPT | Performed by: INTERNAL MEDICINE

## 2018-11-16 PROCEDURE — 4A023N7 MEASUREMENT OF CARDIAC SAMPLING AND PRESSURE, LEFT HEART, PERCUTANEOUS APPROACH: ICD-10-PCS | Performed by: INTERNAL MEDICINE

## 2018-11-16 PROCEDURE — 85025 COMPLETE CBC W/AUTO DIFF WBC: CPT | Performed by: INTERNAL MEDICINE

## 2018-11-16 PROCEDURE — 87340 HEPATITIS B SURFACE AG IA: CPT | Performed by: INTERNAL MEDICINE

## 2018-11-16 PROCEDURE — 83970 ASSAY OF PARATHORMONE: CPT | Performed by: FAMILY MEDICINE

## 2018-11-16 PROCEDURE — 25010000002 HEPARIN (PORCINE) PER 1000 UNITS: Performed by: INTERNAL MEDICINE

## 2018-11-16 PROCEDURE — 82306 VITAMIN D 25 HYDROXY: CPT | Performed by: FAMILY MEDICINE

## 2018-11-16 PROCEDURE — 84156 ASSAY OF PROTEIN URINE: CPT | Performed by: FAMILY MEDICINE

## 2018-11-16 PROCEDURE — 25010000002 MIDAZOLAM PER 1 MG: Performed by: INTERNAL MEDICINE

## 2018-11-16 PROCEDURE — 85610 PROTHROMBIN TIME: CPT | Performed by: INTERNAL MEDICINE

## 2018-11-16 PROCEDURE — 06HM33Z INSERTION OF INFUSION DEVICE INTO RIGHT FEMORAL VEIN, PERCUTANEOUS APPROACH: ICD-10-PCS | Performed by: THORACIC SURGERY (CARDIOTHORACIC VASCULAR SURGERY)

## 2018-11-16 PROCEDURE — 25010000002 FENTANYL CITRATE (PF) 100 MCG/2ML SOLUTION: Performed by: INTERNAL MEDICINE

## 2018-11-16 PROCEDURE — B2151ZZ FLUOROSCOPY OF LEFT HEART USING LOW OSMOLAR CONTRAST: ICD-10-PCS | Performed by: INTERNAL MEDICINE

## 2018-11-16 PROCEDURE — 0 IOPAMIDOL PER 1 ML: Performed by: INTERNAL MEDICINE

## 2018-11-16 PROCEDURE — C1760 CLOSURE DEV, VASC: HCPCS | Performed by: INTERNAL MEDICINE

## 2018-11-16 PROCEDURE — 25010000002 HEPARIN (PORCINE) PER 1000 UNITS: Performed by: FAMILY MEDICINE

## 2018-11-16 PROCEDURE — C1752 CATH,HEMODIALYSIS,SHORT-TERM: HCPCS

## 2018-11-16 PROCEDURE — 81050 URINALYSIS VOLUME MEASURE: CPT | Performed by: INTERNAL MEDICINE

## 2018-11-16 PROCEDURE — B54BZZA ULTRASONOGRAPHY OF RIGHT LOWER EXTREMITY VEINS, GUIDANCE: ICD-10-PCS | Performed by: THORACIC SURGERY (CARDIOTHORACIC VASCULAR SURGERY)

## 2018-11-16 PROCEDURE — 85025 COMPLETE CBC W/AUTO DIFF WBC: CPT | Performed by: FAMILY MEDICINE

## 2018-11-16 PROCEDURE — 80048 BASIC METABOLIC PNL TOTAL CA: CPT | Performed by: INTERNAL MEDICINE

## 2018-11-16 PROCEDURE — 93458 L HRT ARTERY/VENTRICLE ANGIO: CPT | Performed by: INTERNAL MEDICINE

## 2018-11-16 PROCEDURE — 5A1D70Z PERFORMANCE OF URINARY FILTRATION, INTERMITTENT, LESS THAN 6 HOURS PER DAY: ICD-10-PCS | Performed by: FAMILY MEDICINE

## 2018-11-16 PROCEDURE — 76937 US GUIDE VASCULAR ACCESS: CPT

## 2018-11-16 PROCEDURE — 80069 RENAL FUNCTION PANEL: CPT | Performed by: INTERNAL MEDICINE

## 2018-11-16 PROCEDURE — 36556 INSERT NON-TUNNEL CV CATH: CPT | Performed by: THORACIC SURGERY (CARDIOTHORACIC VASCULAR SURGERY)

## 2018-11-16 PROCEDURE — C1769 GUIDE WIRE: HCPCS | Performed by: INTERNAL MEDICINE

## 2018-11-16 PROCEDURE — B2111ZZ FLUOROSCOPY OF MULTIPLE CORONARY ARTERIES USING LOW OSMOLAR CONTRAST: ICD-10-PCS | Performed by: INTERNAL MEDICINE

## 2018-11-16 RX ORDER — HEPARIN SODIUM 1000 [USP'U]/ML
2000 INJECTION, SOLUTION INTRAVENOUS; SUBCUTANEOUS AS NEEDED
Status: DISCONTINUED | OUTPATIENT
Start: 2018-11-17 | End: 2018-11-16 | Stop reason: SDUPTHER

## 2018-11-16 RX ORDER — CHOLECALCIFEROL (VITAMIN D3) 1250 MCG
50000 CAPSULE ORAL
Status: DISCONTINUED | OUTPATIENT
Start: 2018-11-16 | End: 2018-11-21 | Stop reason: HOSPADM

## 2018-11-16 RX ORDER — ALBUMIN (HUMAN) 12.5 G/50ML
12.5 SOLUTION INTRAVENOUS AS NEEDED
Status: ACTIVE | OUTPATIENT
Start: 2018-11-16 | End: 2018-11-17

## 2018-11-16 RX ORDER — HEPARIN SODIUM 1000 [USP'U]/ML
2000 INJECTION, SOLUTION INTRAVENOUS; SUBCUTANEOUS AS NEEDED
Status: DISCONTINUED | OUTPATIENT
Start: 2018-11-16 | End: 2018-11-19

## 2018-11-16 RX ORDER — SODIUM CHLORIDE 9 MG/ML
1-3 INJECTION, SOLUTION INTRAVENOUS CONTINUOUS
Status: DISCONTINUED | OUTPATIENT
Start: 2018-11-16 | End: 2018-11-16

## 2018-11-16 RX ORDER — LIDOCAINE HYDROCHLORIDE 20 MG/ML
INJECTION, SOLUTION INFILTRATION; PERINEURAL AS NEEDED
Status: DISCONTINUED | OUTPATIENT
Start: 2018-11-16 | End: 2018-11-16 | Stop reason: HOSPADM

## 2018-11-16 RX ORDER — DOXERCALCIFEROL 2 UG/ML
2 INJECTION, SOLUTION INTRAVENOUS 3 TIMES WEEKLY
Status: DISCONTINUED | OUTPATIENT
Start: 2018-11-16 | End: 2018-11-21 | Stop reason: HOSPADM

## 2018-11-16 RX ORDER — SODIUM CHLORIDE 9 MG/ML
100 INJECTION, SOLUTION INTRAVENOUS CONTINUOUS
Status: DISCONTINUED | OUTPATIENT
Start: 2018-11-16 | End: 2018-11-16

## 2018-11-16 RX ORDER — FENTANYL CITRATE 50 UG/ML
INJECTION, SOLUTION INTRAMUSCULAR; INTRAVENOUS AS NEEDED
Status: DISCONTINUED | OUTPATIENT
Start: 2018-11-16 | End: 2018-11-16 | Stop reason: HOSPADM

## 2018-11-16 RX ORDER — SODIUM CHLORIDE 0.9 % (FLUSH) 0.9 %
1-10 SYRINGE (ML) INJECTION AS NEEDED
Status: DISCONTINUED | OUTPATIENT
Start: 2018-11-16 | End: 2018-11-20 | Stop reason: HOSPADM

## 2018-11-16 RX ORDER — LABETALOL 300 MG/1
300 TABLET, FILM COATED ORAL EVERY 12 HOURS SCHEDULED
Status: DISCONTINUED | OUTPATIENT
Start: 2018-11-16 | End: 2018-11-19

## 2018-11-16 RX ORDER — SODIUM CHLORIDE 0.9 % (FLUSH) 0.9 %
3 SYRINGE (ML) INJECTION EVERY 12 HOURS SCHEDULED
Status: DISCONTINUED | OUTPATIENT
Start: 2018-11-16 | End: 2018-11-20 | Stop reason: HOSPADM

## 2018-11-16 RX ORDER — MIDAZOLAM HYDROCHLORIDE 1 MG/ML
INJECTION INTRAMUSCULAR; INTRAVENOUS AS NEEDED
Status: DISCONTINUED | OUTPATIENT
Start: 2018-11-16 | End: 2018-11-16 | Stop reason: HOSPADM

## 2018-11-16 RX ORDER — SEVELAMER HYDROCHLORIDE 800 MG/1
800 TABLET, FILM COATED ORAL
Status: DISCONTINUED | OUTPATIENT
Start: 2018-11-16 | End: 2018-11-20

## 2018-11-16 RX ADMIN — HEPARIN SODIUM 3600 UNITS: 1000 INJECTION, SOLUTION INTRAVENOUS; SUBCUTANEOUS at 17:51

## 2018-11-16 RX ADMIN — SODIUM CHLORIDE, PRESERVATIVE FREE 3 ML: 5 INJECTION INTRAVENOUS at 09:32

## 2018-11-16 RX ADMIN — HYDRALAZINE HYDROCHLORIDE 25 MG: 25 TABLET ORAL at 13:49

## 2018-11-16 RX ADMIN — SODIUM CHLORIDE, PRESERVATIVE FREE 3 ML: 5 INJECTION INTRAVENOUS at 13:51

## 2018-11-16 RX ADMIN — LABETALOL HCL 300 MG: 300 TABLET, FILM COATED ORAL at 20:12

## 2018-11-16 RX ADMIN — FAMOTIDINE 40 MG: 40 TABLET ORAL at 09:32

## 2018-11-16 RX ADMIN — GUANFACINE 1 MG: 1 TABLET ORAL at 20:12

## 2018-11-16 RX ADMIN — OFLOXACIN 50000 UNITS: 300 TABLET, COATED ORAL at 13:49

## 2018-11-16 RX ADMIN — HYDRALAZINE HYDROCHLORIDE 25 MG: 25 TABLET ORAL at 06:04

## 2018-11-16 RX ADMIN — HYDRALAZINE HYDROCHLORIDE 25 MG: 25 TABLET ORAL at 22:06

## 2018-11-16 RX ADMIN — AMLODIPINE BESYLATE 10 MG: 10 TABLET ORAL at 09:32

## 2018-11-16 RX ADMIN — LABETALOL HYDROCHLORIDE 200 MG: 200 TABLET, FILM COATED ORAL at 09:32

## 2018-11-16 RX ADMIN — MELATONIN 6 MG: 3 TAB ORAL at 23:36

## 2018-11-16 RX ADMIN — INSULIN ASPART 3 UNITS: 100 INJECTION, SOLUTION INTRAVENOUS; SUBCUTANEOUS at 20:12

## 2018-11-16 RX ADMIN — HEPARIN SODIUM 5000 UNITS: 5000 INJECTION INTRAVENOUS; SUBCUTANEOUS at 06:04

## 2018-11-16 NOTE — PROGRESS NOTES
"[facility]  [address] [place] [address]. [id number]   - [phone]   - [phone]     Progress Note          PATIENT  DEMOGRAPHICS   PATIENT NAME: Jamil Olivo                      PHYSICIAN: Mark Crawley MD  : 1965  MRN: [id number]   LOS: 1 day    Patient Care Team:  John Hyde MD as PCP - General (Family Medicine)  Subjective   SUBJECTIVE   No chest pain dyspnea better         Objective   OBJECTIVE   Vital Signs  Temp:  [96.4 °F (35.8 °C)-98.2 °F (36.8 °C)] 96.6 °F (35.9 °C)  Heart Rate:  [] 83  Resp:  [18] 18  BP: (125-184)/() 157/111    Flowsheet Rows      First Filed Value   Admission Height  180.3 cm (71\") Documented at 2018 2335   Admission Weight  106 kg (232 lb 9.6 oz) Documented at 2018 2335           I/O last 3 completed shifts:  In: 540 [P.O.:540]  Out: 645 [Urine:645]    PHYSICAL EXAM    Physical Exam   Constitutional: He is oriented to person, place, and time. He appears well-developed.   HENT:   Head: Normocephalic.   Eyes: Pupils are equal, round, and reactive to light.   Neck: JVD present.   Cardiovascular: Normal rate, regular rhythm and normal heart sounds.   Pulmonary/Chest: Effort normal and breath sounds normal.   Abdominal: Soft. Bowel sounds are normal.   Musculoskeletal: He exhibits edema.   Neurological: He is alert and oriented to person, place, and time.       RESULTS   Results Review:    Results from last 7 days   Lab Units  18   0541  11/15/18   0517   SODIUM mmol/L  137  138   POTASSIUM mmol/L  3.8  4.0   CHLORIDE mmol/L  100  103   CO2 mmol/L  26.0  24.0   BUN mg/dL  97*  91*   CREATININE mg/dL  5.90*  5.90*   CALCIUM mg/dL  8.2*  8.9   GLUCOSE mg/dL  60  113*       Estimated Creatinine Clearance: 17.5 mL/min (A) (by C-G formula based on SCr of 5.9 mg/dL (H)).    Results from last 7 days   Lab Units  18   0541  11/15/18   0517   MAGNESIUM mg/dL   --   2.5*   PHOSPHORUS mg/dL  6.1*  5.2*        "      Results from last 7 days   Lab Units  11/16/18   0541  11/15/18   0141   WBC 10*3/mm3  7.01  10.20*   HEMOGLOBIN g/dL  10.3*  11.5*   PLATELETS 10*3/mm3  298  315               Imaging Results (last 24 hours)     Procedure Component Value Units Date/Time    XR Chest 1 View [[id number]] Collected:  11/15/18 2318     Updated:  11/15/18 2341    Narrative:       Exam AP portable chest    INDICATION: Hypoxia    COMPARISON: 11/15/2018 at 1:23 AM    FINDINGS: AP portable chest. The bony structures are intact. The  heart is enlarged. No acute infiltrate, pneumothorax or pleural  effusion.      Impression:       Cardiomegaly.    Electronically signed by:  Reji Avelar MD  11/15/2018 11:40 PM  CST Workstation: [employer] Renal Bilateral [[id number]] Collected:  11/15/18 1306     Updated:  11/15/18 1431    Narrative:        PROCEDURE: Complete retroperitoneal sonogram    COMPARISON: No comparison    HISTORY: Chronic kidney disease stage 4-5    FINDINGS: Realtime sonographic images are obtained of the kidneys  and bladder. The kidneys are normal in size. The kidneys are  isoechoic or even slightly hyperechoic compared to the liver and  probably also isoechoic to spleen. No hydronephrosis or shadowing  stones. The right kidney measures 11.8 cm in length, the left  kidney 12.4 cm in length.    The bladder is well-distended. There is mild bladder wall  thickening.       Impression:       1. There is suggestion of mildly increased renal cortical  echogenicity when compared to the spleen and liver. This suggests  sequela of renal parenchymal disease. No hydronephrosis.  2. Question of mildly increased echogenicity of the liver which  may be related to diffuse hepatic steatosis.  3. Mild bladder wall thickening. This could be related to chronic  bladder outlet obstruction. Acute cystitis would be a less likely  possibility.    Electronically signed by:  Phillip Monae MD  11/15/2018 2:29  PM CST Workstation:  [id number]           MEDICATIONS      amLODIPine 10 mg Oral Q24H   famotidine 40 mg Oral Daily   guanFACINE 1 mg Oral Nightly   heparin (porcine) 5,000 Units Subcutaneous Q8H   hydrALAZINE 25 mg Oral Q8H   insulin aspart 0-14 Units Subcutaneous 4x Daily AC & at Bedtime   insulin detemir 20 Units Subcutaneous Nightly   labetalol 200 mg Oral Q12H   melatonin 6 mg Oral Nightly   sodium chloride 3 mL Intravenous Q12H       nitroglycerin 10-50 mcg/min Last Rate: Stopped (11/15/18 0167)       Assessment/Plan   ASSESSMENT / PLAN      CHF (congestive heart failure) (CMS/[facility])    1.CKD 5 with the worsening creatinine and now fluid overload with the pulmonary edema and anasarca.  Patient creatinine is up to 5.9 his GFR is only 12 mL per minute. No change in cr from yesterday and I have discussed with him and he is agreeable to start dialysis in this admission will do hemodialysis now and then PD later. Temp cath and then tunnel cath next week. Start out pt hd setup. Heart cath first.      Awaiting 24 hr cr cl      2.hypertension poorly controlled. Relatively better on labetalol  amlodipine and hydralazine.  He is also on Tenex at nighttime.       3.CKD/minimal and bone disorder.  We'll do a renal diet for now.  phos is high and start renagel - add vitamin d 34699 units once a week and add hectorol with hd    4. Anemia of ckd add fe studies                 This document has been electronically signed by Mark Crawley MD on November 16, 2018 9:54 AM

## 2018-11-16 NOTE — PLAN OF CARE
Problem: Patient Care Overview  Goal: Plan of Care Review   11/16/18 0457   Coping/Psychosocial   Plan of Care Reviewed With patient;spouse   Plan of Care Review   Progress improving   OTHER   Outcome Summary Pt VSS stable, pt BP improved and maintaining off nitro gtt, pt anticipates heart cath today, no reports of chest pain throughout shift.

## 2018-11-17 LAB
ALBUMIN SERPL-MCNC: 3.2 G/DL (ref 3.4–4.8)
ANION GAP SERPL CALCULATED.3IONS-SCNC: 12 MMOL/L (ref 5–15)
BUN BLD-MCNC: 73 MG/DL (ref 7–21)
BUN/CREAT SERPL: 12.5 (ref 7–25)
CALCIUM SPEC-SCNC: 7.9 MG/DL (ref 8.4–10.2)
CHLORIDE SERPL-SCNC: 96 MMOL/L (ref 95–110)
CO2 SERPL-SCNC: 26 MMOL/L (ref 22–31)
CREAT BLD-MCNC: 5.82 MG/DL (ref 0.7–1.3)
DEPRECATED RDW RBC AUTO: 43 FL (ref 35.1–43.9)
ERYTHROCYTE [DISTWIDTH] IN BLOOD BY AUTOMATED COUNT: 13.4 % (ref 11.5–14.5)
FERRITIN SERPL-MCNC: 57 NG/ML (ref 17.9–464)
FOLATE SERPL-MCNC: 8.35 NG/ML (ref 2.76–21)
GFR SERPL CREATININE-BSD FRML MDRD: 12 ML/MIN/1.73 (ref 56–130)
GFR SERPL CREATININE-BSD FRML MDRD: ABNORMAL ML/MIN/1.73 (ref 56–130)
GLUCOSE BLD-MCNC: 123 MG/DL (ref 60–100)
GLUCOSE BLDC GLUCOMTR-MCNC: 118 MG/DL (ref 70–130)
GLUCOSE BLDC GLUCOMTR-MCNC: 136 MG/DL (ref 70–130)
GLUCOSE BLDC GLUCOMTR-MCNC: 144 MG/DL (ref 70–130)
GLUCOSE BLDC GLUCOMTR-MCNC: 264 MG/DL (ref 70–130)
HCT VFR BLD AUTO: 31.7 % (ref 39–49)
HGB BLD-MCNC: 10.4 G/DL (ref 13.7–17.3)
IRON 24H UR-MRATE: 33 MCG/DL (ref 49–181)
IRON SATN MFR SERPL: 10 % (ref 20–55)
MCH RBC QN AUTO: 28.7 PG (ref 26.5–34)
MCHC RBC AUTO-ENTMCNC: 32.8 G/DL (ref 31.5–36.3)
MCV RBC AUTO: 87.6 FL (ref 80–98)
PHOSPHATE SERPL-MCNC: 5.8 MG/DL (ref 2.4–4.4)
PLATELET # BLD AUTO: 297 10*3/MM3 (ref 150–450)
PMV BLD AUTO: 9.3 FL (ref 8–12)
POTASSIUM BLD-SCNC: 4.2 MMOL/L (ref 3.5–5.1)
RBC # BLD AUTO: 3.62 10*6/MM3 (ref 4.37–5.74)
SODIUM BLD-SCNC: 134 MMOL/L (ref 137–145)
TIBC SERPL-MCNC: 336 MCG/DL (ref 261–462)
VIT B12 BLD-MCNC: 555 PG/ML (ref 239–931)
WBC NRBC COR # BLD: 8.22 10*3/MM3 (ref 3.2–9.8)

## 2018-11-17 PROCEDURE — 25010000002 NA FERRIC GLUC CPLX PER 12.5 MG: Performed by: INTERNAL MEDICINE

## 2018-11-17 PROCEDURE — 82962 GLUCOSE BLOOD TEST: CPT

## 2018-11-17 PROCEDURE — 83540 ASSAY OF IRON: CPT | Performed by: INTERNAL MEDICINE

## 2018-11-17 PROCEDURE — 82728 ASSAY OF FERRITIN: CPT | Performed by: INTERNAL MEDICINE

## 2018-11-17 PROCEDURE — 63710000001 INSULIN ASPART PER 5 UNITS: Performed by: FAMILY MEDICINE

## 2018-11-17 PROCEDURE — 85027 COMPLETE CBC AUTOMATED: CPT | Performed by: INTERNAL MEDICINE

## 2018-11-17 PROCEDURE — 82746 ASSAY OF FOLIC ACID SERUM: CPT | Performed by: INTERNAL MEDICINE

## 2018-11-17 PROCEDURE — 5A1D70Z PERFORMANCE OF URINARY FILTRATION, INTERMITTENT, LESS THAN 6 HOURS PER DAY: ICD-10-PCS | Performed by: FAMILY MEDICINE

## 2018-11-17 PROCEDURE — 25010000002 HEPARIN (PORCINE) PER 1000 UNITS: Performed by: INTERNAL MEDICINE

## 2018-11-17 PROCEDURE — 83550 IRON BINDING TEST: CPT | Performed by: INTERNAL MEDICINE

## 2018-11-17 PROCEDURE — 82607 VITAMIN B-12: CPT | Performed by: INTERNAL MEDICINE

## 2018-11-17 PROCEDURE — 80069 RENAL FUNCTION PANEL: CPT | Performed by: INTERNAL MEDICINE

## 2018-11-17 RX ORDER — LISINOPRIL 10 MG/1
10 TABLET ORAL NIGHTLY
Status: DISCONTINUED | OUTPATIENT
Start: 2018-11-17 | End: 2018-11-18

## 2018-11-17 RX ORDER — HYDROCODONE BITARTRATE AND ACETAMINOPHEN 5; 325 MG/1; MG/1
1 TABLET ORAL 3 TIMES DAILY PRN
Status: DISCONTINUED | OUTPATIENT
Start: 2018-11-17 | End: 2018-11-18

## 2018-11-17 RX ORDER — MUSCLE RUB CREAM 100; 150 MG/G; MG/G
CREAM TOPICAL
Status: DISCONTINUED | OUTPATIENT
Start: 2018-11-17 | End: 2018-11-21 | Stop reason: HOSPADM

## 2018-11-17 RX ADMIN — LABETALOL HCL 300 MG: 300 TABLET, FILM COATED ORAL at 20:00

## 2018-11-17 RX ADMIN — HEPARIN SODIUM 2000 UNITS: 1000 INJECTION, SOLUTION INTRAVENOUS; SUBCUTANEOUS at 11:05

## 2018-11-17 RX ADMIN — ACETAMINOPHEN 650 MG: 325 TABLET ORAL at 17:41

## 2018-11-17 RX ADMIN — SODIUM CHLORIDE, PRESERVATIVE FREE 3 ML: 5 INJECTION INTRAVENOUS at 12:33

## 2018-11-17 RX ADMIN — SODIUM CHLORIDE, PRESERVATIVE FREE 3 ML: 5 INJECTION INTRAVENOUS at 20:01

## 2018-11-17 RX ADMIN — MELATONIN 6 MG: 3 TAB ORAL at 20:00

## 2018-11-17 RX ADMIN — AMLODIPINE BESYLATE 10 MG: 10 TABLET ORAL at 12:31

## 2018-11-17 RX ADMIN — RENAGEL 800 MG: 800 TABLET ORAL at 17:31

## 2018-11-17 RX ADMIN — LISINOPRIL 10 MG: 10 TABLET ORAL at 20:00

## 2018-11-17 RX ADMIN — RENAGEL 800 MG: 800 TABLET ORAL at 12:31

## 2018-11-17 RX ADMIN — SODIUM CHLORIDE, PRESERVATIVE FREE 3 ML: 5 INJECTION INTRAVENOUS at 12:32

## 2018-11-17 RX ADMIN — HYDROCODONE BITARTRATE AND ACETAMINOPHEN 1 TABLET: 5; 325 TABLET ORAL at 19:58

## 2018-11-17 RX ADMIN — INSULIN ASPART 8 UNITS: 100 INJECTION, SOLUTION INTRAVENOUS; SUBCUTANEOUS at 17:31

## 2018-11-17 RX ADMIN — SODIUM CHLORIDE 125 MG: 9 INJECTION, SOLUTION INTRAVENOUS at 09:23

## 2018-11-17 RX ADMIN — MENTHOL, METHYL SALICYLATE: 10; 15 CREAM TOPICAL at 19:58

## 2018-11-17 RX ADMIN — HYDRALAZINE HYDROCHLORIDE 25 MG: 25 TABLET ORAL at 06:20

## 2018-11-17 RX ADMIN — LABETALOL HCL 300 MG: 300 TABLET, FILM COATED ORAL at 12:32

## 2018-11-17 RX ADMIN — FAMOTIDINE 40 MG: 40 TABLET ORAL at 12:32

## 2018-11-17 NOTE — PROGRESS NOTES
"[facility]  [address] [place] [address]. [address]  T - [phone]  F - [phone]     Progress Note          PATIENT  DEMOGRAPHICS   PATIENT NAME: Jamil Olivo                      PHYSICIAN: Mark Crawley MD  : 1965  MRN: [id number]   LOS: 2 days    Patient Care Team:  John Hyde MD as PCP - General (Family Medicine)  Subjective   SUBJECTIVE   Seen during dialysis tolerating it well bp is reasonable         Objective   OBJECTIVE   Vital Signs  Temp:  [96.6 °F (35.9 °C)-98.4 °F (36.9 °C)] 97.8 °F (36.6 °C)  Heart Rate:  [76-91] 76  Resp:  [18] 18  BP: (111-175)/() 124/87    Flowsheet Rows      First Filed Value   Admission Height  180.3 cm (71\") Documented at 2018 2335   Admission Weight  106 kg (232 lb 9.6 oz) Documented at 2018 2335           I/O last 3 completed shifts:  In: 540 [P.O.:540]  Out: 3020 [Urine:1020; Other:2000]    PHYSICAL EXAM    Physical Exam   Constitutional: He is oriented to person, place, and time. He appears well-developed.   HENT:   Head: Normocephalic.   Eyes: Pupils are equal, round, and reactive to light.   Neck: JVD present.   Cardiovascular: Normal rate, regular rhythm and normal heart sounds.   Pulmonary/Chest: Effort normal and breath sounds normal.   Abdominal: Soft. Bowel sounds are normal.   Musculoskeletal: He exhibits edema.   Neurological: He is alert and oriented to person, place, and time.       RESULTS   Results Review:    Results from last 7 days   Lab Units  18   0638  18   1343  18   0541   SODIUM mmol/L  134*  136*  137   POTASSIUM mmol/L  4.2  4.1  3.8   CHLORIDE mmol/L  96  100  100   CO2 mmol/L  26.0  24.0  26.0   BUN mg/dL  73*  94*  97*   CREATININE mg/dL  5.82*  6.33*  5.90*   CALCIUM mg/dL  7.9*  8.4  8.2*   GLUCOSE mg/dL  123*  64  60       Estimated Creatinine Clearance: 17.6 mL/min (A) (by C-G formula based on SCr of 5.82 mg/dL (H)).    Results from last 7 days   Lab " Units  11/17/18   0638  11/16/18   0541  11/15/18   0517   MAGNESIUM mg/dL   --    --   2.5*   PHOSPHORUS mg/dL  5.8*  6.1*  5.2*             Results from last 7 days   Lab Units  11/17/18   0638  11/16/18   1343  11/16/18   0541  11/15/18   0141   WBC 10*3/mm3  8.22  8.21  7.01  10.20*   HEMOGLOBIN g/dL  10.4*  10.7*  10.3*  11.5*   PLATELETS 10*3/mm3  297  302  298  315       Results from last 7 days   Lab Units  11/16/18   1343   INR   1.14         Imaging Results (last 24 hours)     Procedure Component Value Units Date/Time    IR insert non-tunneled CVC 5+ [[id number]] Resulted:  11/16/18 1304     Updated:  11/16/18 1304    US Guided Vascular Access [[id number]] Resulted:  11/16/18 1251     Updated:  11/16/18 1251    Narrative:       This procedure was auto-finalized with no dictation required.           MEDICATIONS      amLODIPine 10 mg Oral Q24H   doxercalciferol 2 mcg Intravenous Once per day on Mon Wed Fri   famotidine 40 mg Oral Daily   guanFACINE 1 mg Oral Nightly   hydrALAZINE 25 mg Oral Q8H   insulin aspart 0-14 Units Subcutaneous 4x Daily AC & at Bedtime   labetalol 300 mg Oral Q12H   melatonin 6 mg Oral Nightly   sevelamer 800 mg Oral TID With Meals   sodium chloride 3 mL Intravenous Q12H   sodium chloride 3 mL Intravenous Q12H   Vitamin D3 50,000 Units Oral Q7 Days       nitroglycerin 10-50 mcg/min Last Rate: Stopped (11/15/18 1557)       Assessment/Plan   ASSESSMENT / PLAN      Congestive heart failure (CMS/[facility])    CHF (congestive heart failure) (CMS/[facility])    1.CKD 5 with the worsening creatinine and now fluid overload with the pulmonary edema and anasarca / ESRD.  Patient creatinine is up to 6.3. We started dialysis on 11/16/18. Hd today and then Monday. Out pt hd is requested. Hd yesterday post contrast. Tunnel cath next week (d/w CT surgery). PD catheter and training later.      13 gm of protein. 24 hr cr cl is 12 ml/min.     2.hypertension poorly controlled. Relatively better on labetalol   amlodipine and hydralazine.  will stop tenex and add lisinopril. May be wean hydralazine     3.CKD/minimal and bone disorder.  We'll do a renal diet for now.  keep renagel, vitamin d and hectorol    4. Anemia of ckd low t sat - start iv fe with hd.     5. CAD ICM EF 20%. 100% occlusion RCA and CX. Plan for AICD                This document has been electronically signed by Mark Crawley MD on November 17, 2018 8:26 AM

## 2018-11-17 NOTE — PROGRESS NOTES
Patient seen post coronary angiogram which had revealed 100% occlusion of the native right coronary artery and 100% occlusion of the circumflex artery with nonobstructive disease in the left anterior descending artery with left ventricular systolic dysfunction with an ejection fraction of 20%.  Patient clearly has ischemic cardiomyopathy.  Have discussed with the patient the need for an AICD implantation to prevent sudden cardiac death.  Have discussed with Dr. Haque who would evaluate the patient for consideration for an AICD placement on Monday.  Clinically at the present time patient is not having symptoms of chest pain and his not in heart failure.        Dr. Myers to cover for cardiology if needed.

## 2018-11-17 NOTE — PROGRESS NOTES
[facility] Medicine Services  INPATIENT PROGRESS NOTE    Length of Stay: 2  Date of Admission: 11/14/2018  Primary Care Physician: John Hyde MD    Subjective   Chief Complaint: swelling  HPI:    Admitted for renal failure and CHF.  Currently in dialysis.  Reports feeling fine. No complaints.     Review of Systems   Respiratory: Negative for shortness of breath.    Cardiovascular: Negative for chest pain.        All pertinent negatives and positives are as above. All other systems have been reviewed and are negative unless otherwise stated.     Objective    Temp:  [97.3 °F (36.3 °C)-98.4 °F (36.9 °C)] 97.8 °F (36.6 °C)  Heart Rate:  [76-91] 76  Resp:  [18] 18  BP: (111-175)/() 124/87  Physical Exam   Constitutional: He appears well-developed and well-nourished. No distress.   HENT:   Head: Normocephalic and atraumatic.   Cardiovascular: Normal rate.   Pulmonary/Chest: Effort normal. No respiratory distress. He has no wheezes.   Abdominal: Soft. He exhibits no distension.   Musculoskeletal: Normal range of motion. He exhibits no edema.   Neurological: He is alert. No cranial nerve deficit.   Skin: Skin is warm and dry. He is not diaphoretic.   Psychiatric: He has a normal mood and affect. His behavior is normal. Judgment and thought content normal.           Results Review:  I have reviewed the labs, radiology results, and diagnostic studies.    Laboratory Data:   Lab Results (last 24 hours)     Procedure Component Value Units Date/Time    Vitamin B12 [[id number]]  (Normal) Collected:  11/17/18 0638    Specimen:  Blood Updated:  11/17/18 0911     Vitamin B-12 555 pg/mL     Folate [[id number]]  (Normal) Collected:  11/17/18 0638    Specimen:  Blood Updated:  11/17/18 0911     Folate 8.35 ng/mL     Ferritin [[id number]]  (Normal) Collected:  11/17/18 0638    Specimen:  Blood Updated:  11/17/18 0841     Ferritin 57.00 ng/mL     Iron Profile [[id number]]  (Abnormal)  Collected:  11/17/18 0638    Specimen:  Blood Updated:  11/17/18 0733     Iron 33 mcg/dL      TIBC 336 mcg/dL      Iron Saturation 10 %     Renal Function Panel [[id number]]  (Abnormal) Collected:  11/17/18 0638    Specimen:  Blood Updated:  11/17/18 0730     Glucose 123 mg/dL      BUN 73 mg/dL      Creatinine 5.82 mg/dL      Sodium 134 mmol/L      Potassium 4.2 mmol/L      Chloride 96 mmol/L      CO2 26.0 mmol/L      Calcium 7.9 mg/dL      Albumin 3.20 g/dL      Phosphorus 5.8 mg/dL      Anion Gap 12.0 mmol/L      BUN/Creatinine Ratio 12.5     eGFR Non African Amer -- mL/min/1.73      Comment: <15 Indicative of kidney failure.        eGFR  African Amer 12 mL/min/1.73      Comment: <15 Indicative of kidney failure.       CBC (No Diff) [[id number]]  (Abnormal) Collected:  11/17/18 0638    Specimen:  Blood Updated:  11/17/18 0707     WBC 8.22 10*3/mm3      RBC 3.62 10*6/mm3      Hemoglobin 10.4 g/dL      Hematocrit 31.7 %      MCV 87.6 fL      MCH 28.7 pg      MCHC 32.8 g/dL      RDW 13.4 %      RDW-SD 43.0 fl      MPV 9.3 fL      Platelets 297 10*3/mm3     POC Glucose Once [[id number]]  (Abnormal) Collected:  11/17/18 0626    Specimen:  Blood Updated:  11/17/18 0656     Glucose 136 mg/dL      Comment: RN NotifiedOperator: [id number] SHAUN Carvajal ID: [id number]       POC Glucose Once [[id number]]  (Abnormal) Collected:  11/16/18 1938    Specimen:  Blood Updated:  11/16/18 2026     Glucose 199 mg/dL      Comment: RN NotifiedOperator: [id number] AARTI AMYMeter ID: [id number]       Protime-INR [[id number]]  (Normal) Collected:  11/16/18 1343    Specimen:  Blood Updated:  11/16/18 1413     Protime 14.3 Seconds      INR 1.14    Narrative:       Therapeutic range for most indications is 2.0-3.0 INR,  or 2.5-3.5 for mechanical heart valves.    POC Glucose Once [[id number]]  (Abnormal) Collected:  11/16/18 1348    Specimen:  Blood Updated:  11/16/18 1408     Glucose 64 mg/dL      Comment: : [id number]  MATT Jha ID: [id number]       Basic Metabolic Panel [[id number]]  (Abnormal) Collected:  11/16/18 1343    Specimen:  Blood Updated:  11/16/18 1405     Glucose 64 mg/dL      BUN 94 mg/dL      Creatinine 6.33 mg/dL      Sodium 136 mmol/L      Potassium 4.1 mmol/L      Chloride 100 mmol/L      CO2 24.0 mmol/L      Calcium 8.4 mg/dL      eGFR  African Amer 11 mL/min/1.73      Comment: <15 Indicative of kidney failure.        eGFR Non African Amer -- mL/min/1.73      Comment: <15 Indicative of kidney failure.        BUN/Creatinine Ratio 14.8     Anion Gap 12.0 mmol/L     CBC & Differential [[id number]] Collected:  11/16/18 1343    Specimen:  Blood Updated:  11/16/18 1349    Narrative:       The following orders were created for panel order CBC & Differential.  Procedure                               Abnormality         Status                     ---------                               -----------         ------                     CBC Auto Differential[[id number]]        Abnormal            Final result                 Please view results for these tests on the individual orders.    CBC Auto Differential [[id number]]  (Abnormal) Collected:  11/16/18 1343    Specimen:  Blood Updated:  11/16/18 1349     WBC 8.21 10*3/mm3      RBC 3.68 10*6/mm3      Hemoglobin 10.7 g/dL      Hematocrit 32.2 %      MCV 87.5 fL      MCH 29.1 pg      MCHC 33.2 g/dL      RDW 13.7 %      RDW-SD 43.4 fl      MPV 9.1 fL      Platelets 302 10*3/mm3      Neutrophil % 74.3 %      Lymphocyte % 7.9 %      Monocyte % 7.7 %      Eosinophil % 9.7 %      Basophil % 0.2 %      Immature Grans % 0.2 %      Neutrophils, Absolute 6.09 10*3/mm3      Lymphocytes, Absolute 0.65 10*3/mm3      Monocytes, Absolute 0.63 10*3/mm3      Eosinophils, Absolute 0.80 10*3/mm3      Basophils, Absolute 0.02 10*3/mm3      Immature Grans, Absolute 0.02 10*3/mm3      nRBC 0.0 /100 WBC     Protein, Urine, 24 Hour - Urine, Clean Catch [[id number]]  (Abnormal) Collected:   11/16/18 0932    Specimen:  24 Hour Urine from Urine, Clean Catch Updated:  11/16/18 1237     Protein, 24H Urine 13,011.2 mg/24hours      Total Protein, Urine 684.8 mg/dL      24H Urine Volume 1,900 mL      Time (Hours) 24 hrs     Hepatitis B Surface Antigen [[id number]]  (Normal) Collected:  11/16/18 0541    Specimen:  Blood Updated:  11/16/18 1218     Hepatitis B Surface Ag Negative          Culture Data:   No results found for: BLOODCX  No results found for: URINECX  No results found for: RESPCX  No results found for: WOUNDCX  No results found for: STOOLCX  No components found for: BODYFLD    Radiology Data:   Imaging Results (last 24 hours)     Procedure Component Value Units Date/Time    IR insert non-tunneled CVC 5+ [[id number]] Resulted:  11/16/18 1304     Updated:  11/16/18 1304    US Guided Vascular Access [[id number]] Resulted:  11/16/18 1251     Updated:  11/16/18 1251    Narrative:       This procedure was auto-finalized with no dictation required.          I have reviewed the patient's current medications.     Assessment/Plan     Active Hospital Problems    Diagnosis   • **Congestive heart failure (CMS/HCC)     Added automatically from request for surgery [id number]     • CHF (congestive heart failure) (CMS/HCC)     1. A/C sys CHF: worse. receiving dialysis today.  Also will need AICD before discharge    2. Hypertensive crisis: improved, blood pressure better today  3. A/CKD: worse.  Dialysis today.  Per nephrology  4. NSTEMI: s/p LHC with severe CAD and cardiology recommended medical management.  5. IDDM: borderline low sugars.  Stop long acting. continue SSI                  Tristan Noonan MD   11/17/18   11:25 AM

## 2018-11-17 NOTE — PROGRESS NOTES
[facility] Medicine Services  INPATIENT PROGRESS NOTE    Length of Stay: 1  Date of Admission: 11/14/2018  Primary Care Physician: John Hyde MD    Subjective   Chief Complaint: swelling  HPI:  Admitted for CHF, severe renal failure, nstemi.  Had C with severe CAD and cards recommended medical management.  Was started on dialysis today.  Will get ACID before discharge per Dr Yi    Review of Systems   Respiratory: Negative for shortness of breath.    Cardiovascular: Negative for chest pain.   Gastrointestinal: Negative for abdominal pain.        All pertinent negatives and positives are as above. All other systems have been reviewed and are negative unless otherwise stated.     Objective    Temp:  [96.4 °F (35.8 °C)-97.8 °F (36.6 °C)] 97.8 °F (36.6 °C)  Heart Rate:  [60-85] 85  Resp:  [18] 18  BP: (125-175)/() 162/87    Physical Exam   Constitutional: He appears well-developed.   HENT:   Head: Normocephalic and atraumatic.   Eyes: Pupils are equal, round, and reactive to light.   Neck: Normal range of motion.   Cardiovascular: Normal rate.   Pulmonary/Chest: He has decreased breath sounds. He has no wheezes.   Abdominal: Soft. There is no tenderness.   Musculoskeletal: He exhibits edema.   Neurological: He is alert.   Skin: Skin is warm and dry.   Psychiatric: He has a normal mood and affect.           Results Review:  I have reviewed the labs, radiology results, and diagnostic studies.    Laboratory Data:   Results from last 7 days   Lab Units  11/16/18   1343  11/16/18   0541  11/15/18   0517   SODIUM mmol/L  136*  137  138   POTASSIUM mmol/L  4.1  3.8  4.0   CHLORIDE mmol/L  100  100  103   CO2 mmol/L  24.0  26.0  24.0   BUN mg/dL  94*  97*  91*   CREATININE mg/dL  6.33*  5.90*  5.90*   GLUCOSE mg/dL  64  60  113*   CALCIUM mg/dL  8.4  8.2*  8.9   ANION GAP mmol/L  12.0  11.0  11.0     Estimated Creatinine Clearance: 16.3 mL/min (A) (by C-G formula  based on SCr of 6.33 mg/dL (H)).  Results from last 7 days   Lab Units  11/16/18   0541  11/15/18   0517   MAGNESIUM mg/dL   --   2.5*   PHOSPHORUS mg/dL  6.1*  5.2*         Results from last 7 days   Lab Units  11/16/18   1343  11/16/18   0541  11/15/18   0141   WBC 10*3/mm3  8.21  7.01  10.20*   HEMOGLOBIN g/dL  10.7*  10.3*  11.5*   HEMATOCRIT %  32.2*  30.9*  34.5*   PLATELETS 10*3/mm3  302  298  315     Results from last 7 days   Lab Units  11/16/18   1343   INR   1.14       Culture Data:   No results found for: BLOODCX  No results found for: URINECX  No results found for: RESPCX  No results found for: WOUNDCX  No results found for: STOOLCX  No components found for: BODYFLD    Radiology Data:   Imaging Results (last 24 hours)     Procedure Component Value Units Date/Time    IR insert non-tunneled CVC 5+ [[id number]] Resulted:  11/16/18 1304     Updated:  11/16/18 1304    US Guided Vascular Access [[id number]] Resulted:  11/16/18 1251     Updated:  11/16/18 1251    Narrative:       This procedure was auto-finalized with no dictation required.    XR Chest 1 View [[id number]] Collected:  11/15/18 2318     Updated:  11/15/18 2341    Narrative:       Exam AP portable chest    INDICATION: Hypoxia    COMPARISON: 11/15/2018 at 1:23 AM    FINDINGS: AP portable chest. The bony structures are intact. The  heart is enlarged. No acute infiltrate, pneumothorax or pleural  effusion.      Impression:       Cardiomegaly.    Electronically signed by:  Reji Avelar MD  11/15/2018 11:40 PM  CST Workstation: [id number]          I have reviewed the patient's current medications.     Assessment/Plan     Active Hospital Problems    Diagnosis   • **Congestive heart failure (CMS/HCC)     Added automatically from request for surgery [id number]     • CHF (congestive heart failure) (CMS/HCC)       Plan:    1. A/C sys CHF: worse.  Pt was started on dialysis today.  Will need AICD before DC. Cont fluid restriction, strict I/O, daily wts.   Cards following  2. Hypertensive crisis: improved, still not at goal.  Increase labetalol to 300mg bid  3. A/CKD: worse.  Dialysis today.  Per nephrology  4. NSTEMI: s/p LHC with severe CAD and cards recommended medical management.  5. IDDM: borderline low sugars.  Stop long acting.  Cont SSI    Signed     Dr Giuliano Kasper,    11/16/2018  7:43 PM

## 2018-11-18 LAB
ALBUMIN SERPL-MCNC: 2.9 G/DL (ref 3.4–4.8)
ANION GAP SERPL CALCULATED.3IONS-SCNC: 8 MMOL/L (ref 5–15)
BUN BLD-MCNC: 53 MG/DL (ref 7–21)
BUN/CREAT SERPL: 9 (ref 7–25)
CALCIUM SPEC-SCNC: 8.2 MG/DL (ref 8.4–10.2)
CHLORIDE SERPL-SCNC: 98 MMOL/L (ref 95–110)
CO2 SERPL-SCNC: 27 MMOL/L (ref 22–31)
CREAT BLD-MCNC: 5.89 MG/DL (ref 0.7–1.3)
GFR SERPL CREATININE-BSD FRML MDRD: 12 ML/MIN/1.73 (ref 56–130)
GFR SERPL CREATININE-BSD FRML MDRD: ABNORMAL ML/MIN/1.73 (ref 56–130)
GLUCOSE BLD-MCNC: 104 MG/DL (ref 60–100)
GLUCOSE BLDC GLUCOMTR-MCNC: 126 MG/DL (ref 70–130)
GLUCOSE BLDC GLUCOMTR-MCNC: 162 MG/DL (ref 70–130)
GLUCOSE BLDC GLUCOMTR-MCNC: 174 MG/DL (ref 70–130)
GLUCOSE BLDC GLUCOMTR-MCNC: 199 MG/DL (ref 70–130)
PHOSPHATE SERPL-MCNC: 6.4 MG/DL (ref 2.4–4.4)
POTASSIUM BLD-SCNC: 4.3 MMOL/L (ref 3.5–5.1)
SODIUM BLD-SCNC: 133 MMOL/L (ref 137–145)

## 2018-11-18 PROCEDURE — 82962 GLUCOSE BLOOD TEST: CPT

## 2018-11-18 PROCEDURE — 80069 RENAL FUNCTION PANEL: CPT | Performed by: INTERNAL MEDICINE

## 2018-11-18 PROCEDURE — 63710000001 INSULIN ASPART PER 5 UNITS: Performed by: FAMILY MEDICINE

## 2018-11-18 PROCEDURE — 25010000002 ONDANSETRON PER 1 MG: Performed by: FAMILY MEDICINE

## 2018-11-18 RX ORDER — ONDANSETRON 2 MG/ML
4 INJECTION INTRAMUSCULAR; INTRAVENOUS EVERY 6 HOURS PRN
Status: DISCONTINUED | OUTPATIENT
Start: 2018-11-18 | End: 2018-11-20

## 2018-11-18 RX ORDER — HYDROCODONE BITARTRATE AND ACETAMINOPHEN 7.5; 325 MG/1; MG/1
1 TABLET ORAL EVERY 6 HOURS PRN
Status: DISCONTINUED | OUTPATIENT
Start: 2018-11-18 | End: 2018-11-20

## 2018-11-18 RX ORDER — LISINOPRIL 20 MG/1
20 TABLET ORAL NIGHTLY
Status: DISCONTINUED | OUTPATIENT
Start: 2018-11-18 | End: 2018-11-20

## 2018-11-18 RX ADMIN — MELATONIN 6 MG: 3 TAB ORAL at 20:25

## 2018-11-18 RX ADMIN — RENAGEL 800 MG: 800 TABLET ORAL at 11:20

## 2018-11-18 RX ADMIN — SODIUM CHLORIDE, PRESERVATIVE FREE 3 ML: 5 INJECTION INTRAVENOUS at 08:07

## 2018-11-18 RX ADMIN — SODIUM CHLORIDE, PRESERVATIVE FREE 3 ML: 5 INJECTION INTRAVENOUS at 20:25

## 2018-11-18 RX ADMIN — LISINOPRIL 20 MG: 20 TABLET ORAL at 20:26

## 2018-11-18 RX ADMIN — ACETAMINOPHEN 650 MG: 325 TABLET ORAL at 15:09

## 2018-11-18 RX ADMIN — RENAGEL 800 MG: 800 TABLET ORAL at 08:06

## 2018-11-18 RX ADMIN — INSULIN ASPART 3 UNITS: 100 INJECTION, SOLUTION INTRAVENOUS; SUBCUTANEOUS at 20:24

## 2018-11-18 RX ADMIN — LABETALOL HCL 300 MG: 300 TABLET, FILM COATED ORAL at 20:25

## 2018-11-18 RX ADMIN — HYDROCODONE BITARTRATE AND ACETAMINOPHEN 1 TABLET: 7.5; 325 TABLET ORAL at 18:27

## 2018-11-18 RX ADMIN — HYDROCODONE BITARTRATE AND ACETAMINOPHEN 1 TABLET: 5; 325 TABLET ORAL at 05:21

## 2018-11-18 RX ADMIN — HYDROCODONE BITARTRATE AND ACETAMINOPHEN 1 TABLET: 7.5; 325 TABLET ORAL at 10:03

## 2018-11-18 RX ADMIN — INSULIN ASPART 3 UNITS: 100 INJECTION, SOLUTION INTRAVENOUS; SUBCUTANEOUS at 11:20

## 2018-11-18 RX ADMIN — SODIUM CHLORIDE, PRESERVATIVE FREE 3 ML: 5 INJECTION INTRAVENOUS at 08:08

## 2018-11-18 RX ADMIN — FAMOTIDINE 40 MG: 40 TABLET ORAL at 08:06

## 2018-11-18 RX ADMIN — ONDANSETRON 4 MG: 2 INJECTION INTRAMUSCULAR; INTRAVENOUS at 23:22

## 2018-11-18 RX ADMIN — RENAGEL 800 MG: 800 TABLET ORAL at 17:11

## 2018-11-18 RX ADMIN — INSULIN ASPART 3 UNITS: 100 INJECTION, SOLUTION INTRAVENOUS; SUBCUTANEOUS at 08:07

## 2018-11-18 RX ADMIN — AMLODIPINE BESYLATE 10 MG: 10 TABLET ORAL at 08:07

## 2018-11-18 RX ADMIN — LABETALOL HCL 300 MG: 300 TABLET, FILM COATED ORAL at 08:07

## 2018-11-18 RX ADMIN — ONDANSETRON 4 MG: 2 INJECTION INTRAMUSCULAR; INTRAVENOUS at 11:56

## 2018-11-18 NOTE — PROGRESS NOTES
"[facility]  [address] [place] [address]. [address]   - [phone]  F - [phone]     Progress Note          PATIENT  DEMOGRAPHICS   PATIENT NAME: Jamil Olivo                      PHYSICIAN: Mark Crawley MD  : 1965  MRN: [id number]   LOS: 3 days    Patient Care Team:  John Hyde MD as PCP - General (Family Medicine)  Subjective   SUBJECTIVE   Breathing better, c/o right him pain          Objective   OBJECTIVE   Vital Signs  Temp:  [97 °F (36.1 °C)-97.6 °F (36.4 °C)] 97 °F (36.1 °C)  Heart Rate:  [71-83] 74  Resp:  [18] 18  BP: (116-138)/(71-95) 121/77    Flowsheet Rows      First Filed Value   Admission Height  180.3 cm (71\") Documented at 2018 2335   Admission Weight  106 kg (232 lb 9.6 oz) Documented at 2018 2335           I/O last 3 completed shifts:  In: 1080 [P.O.:1080]  Out: 2650 [Urine:150; Other:2500]    PHYSICAL EXAM    Physical Exam   Constitutional: He is oriented to person, place, and time. He appears well-developed.   HENT:   Head: Normocephalic.   Eyes: Pupils are equal, round, and reactive to light.   Neck: JVD present.   Cardiovascular: Normal rate, regular rhythm and normal heart sounds.   Pulmonary/Chest: Effort normal and breath sounds normal.   Abdominal: Soft. Bowel sounds are normal.   Musculoskeletal: He exhibits edema.   Neurological: He is alert and oriented to person, place, and time.       RESULTS   Results Review:    Results from last 7 days   Lab Units  18   0526  18   0638  18   1343   SODIUM mmol/L  133*  134*  136*   POTASSIUM mmol/L  4.3  4.2  4.1   CHLORIDE mmol/L  98  96  100   CO2 mmol/L  27.0  26.0  24.0   BUN mg/dL  53*  73*  94*   CREATININE mg/dL  5.89*  5.82*  6.33*   CALCIUM mg/dL  8.2*  7.9*  8.4   GLUCOSE mg/dL  104*  123*  64       Estimated Creatinine Clearance: 17.3 mL/min (A) (by C-G formula based on SCr of 5.89 mg/dL (H)).    Results from last 7 days   Lab Units  18   0526  " 11/17/18   0638  11/16/18   0541  11/15/18   0517   MAGNESIUM mg/dL   --    --    --   2.5*   PHOSPHORUS mg/dL  6.4*  5.8*  6.1*  5.2*             Results from last 7 days   Lab Units  11/17/18   0638  11/16/18   1343  11/16/18   0541  11/15/18   0141   WBC 10*3/mm3  8.22  8.21  7.01  10.20*   HEMOGLOBIN g/dL  10.4*  10.7*  10.3*  11.5*   PLATELETS 10*3/mm3  297  302  298  315       Results from last 7 days   Lab Units  11/16/18   1343   INR   1.14         Imaging Results (last 24 hours)     ** No results found for the last 24 hours. **           MEDICATIONS      amLODIPine 10 mg Oral Q24H   doxercalciferol 2 mcg Intravenous Once per day on Mon Wed Fri   famotidine 40 mg Oral Daily   [START ON 11/19/2018] ferric gluconate (FERRLECIT) IVPB 125 mg Intravenous Once per day on Mon Wed Fri   insulin aspart 0-14 Units Subcutaneous 4x Daily AC & at Bedtime   labetalol 300 mg Oral Q12H   lisinopril 10 mg Oral Nightly   melatonin 6 mg Oral Nightly   sevelamer 800 mg Oral TID With Meals   sodium chloride 3 mL Intravenous Q12H   sodium chloride 3 mL Intravenous Q12H   Vitamin D3 50,000 Units Oral Q7 Days          Assessment/Plan   ASSESSMENT / PLAN      Congestive heart failure (CMS/[facility])    CHF (congestive heart failure) (CMS/[facility])    1.CKD 5 with the worsening creatinine and now fluid overload with the pulmonary edema and anasarca / ESRD.  Patient creatinine is up to 6.3. We started dialysis on 11/16/18. Hd m/w/f. Plan for tunnel cath and then discharge. Out pt arrangement is pending. PD catheter and training later.      13 gm of protein. 24 hr cr cl is 12 ml/min.     2.hypertension poorly controlled. Relatively better on labetalol  amlodipine and and lisinopril.  off tenex. Stop amlodipine as well and optimize lisinopril     3.CKD/minimal and bone disorder.  We'll do a renal diet for now.  keep renagel, vitamin d and hectorol    4. Anemia of ckd low t sat - on iv fe with hd.     5. CAD ICM EF 20%. 100% occlusion RCA and CX.  Plan for AICD                This document has been electronically signed by Mark Crawley MD on November 18, 2018 11:06 AM

## 2018-11-18 NOTE — PLAN OF CARE
Problem: Patient Care Overview  Goal: Plan of Care Review  Outcome: Ongoing (interventions implemented as appropriate)   11/17/18 1854   Coping/Psychosocial   Plan of Care Reviewed With patient   Plan of Care Review   Progress improving   OTHER   Outcome Summary dialysis today (2.5L off), BP controlled, hyperglycemia today. Pt has had some muscle cramps today.       Problem: Cardiac: Heart Failure (Adult)  Goal: Signs and Symptoms of Listed Potential Problems Will be Absent, Minimized or Managed (Cardiac: Heart Failure)  Outcome: Ongoing (interventions implemented as appropriate)      Problem: Hypertensive Disease/Crisis (Arterial) (Adult)  Goal: Signs and Symptoms of Listed Potential Problems Will be Absent, Minimized or Managed (Hypertensive Disease/Crisis)  Outcome: Ongoing (interventions implemented as appropriate)      Problem: Diabetes, Type 2 (Adult)  Goal: Signs and Symptoms of Listed Potential Problems Will be Absent, Minimized or Managed (Diabetes, Type 2)  Outcome: Ongoing (interventions implemented as appropriate)

## 2018-11-18 NOTE — PROGRESS NOTES
[facility] Medicine Services  INPATIENT PROGRESS NOTE    Length of Stay: 3  Date of Admission: 11/14/2018  Primary Care Physician: John Hyde MD    Subjective   Chief Complaint: right hip pain  HPI:    Has been having right hip pain.  Received dialysis yesterday.  No shortness of air.     Review of Systems   Respiratory: Negative for shortness of breath.    Genitourinary: Negative for dysuria.        All pertinent negatives and positives are as above. All other systems have been reviewed and are negative unless otherwise stated.     Objective    Temp:  [97 °F (36.1 °C)-97.6 °F (36.4 °C)] 97 °F (36.1 °C)  Heart Rate:  [71-83] 74  Resp:  [18] 18  BP: (116-138)/(71-95) 121/77  Physical Exam   Constitutional: He appears well-developed and well-nourished. No distress.   HENT:   Head: Normocephalic and atraumatic.   Cardiovascular: Normal rate.   Pulmonary/Chest: Effort normal. No respiratory distress. He has no wheezes.   Abdominal: Soft. He exhibits no distension.   Musculoskeletal: Normal range of motion. He exhibits no edema.   Neurological: He is alert. No cranial nerve deficit.   Skin: Skin is warm and dry. He is not diaphoretic.   Psychiatric: He has a normal mood and affect. His behavior is normal. Judgment and thought content normal.   Vitals reviewed.          Results Review:  I have reviewed the labs, radiology results, and diagnostic studies.    Laboratory Data:   Lab Results (last 24 hours)     Procedure Component Value Units Date/Time    POC Glucose Once [[id number]]  (Abnormal) Collected:  11/18/18 0642    Specimen:  Blood Updated:  11/18/18 0700     Glucose 162 mg/dL      Comment: RN NotifiedOperator: [id number] BG ELRITAMeter ID: [id number]       Renal Function Panel [[id number]]  (Abnormal) Collected:  11/18/18 0526    Specimen:  Blood Updated:  11/18/18 0646     Glucose 104 mg/dL      BUN 53 mg/dL      Creatinine 5.89 mg/dL      Sodium 133 mmol/L       Potassium 4.3 mmol/L      Chloride 98 mmol/L      CO2 27.0 mmol/L      Calcium 8.2 mg/dL      Albumin 2.90 g/dL      Phosphorus 6.4 mg/dL      Anion Gap 8.0 mmol/L      BUN/Creatinine Ratio 9.0     eGFR Non African Amer -- mL/min/1.73      Comment: <15 Indicative of kidney failure.        eGFR  African Amer 12 mL/min/1.73      Comment: <15 Indicative of kidney failure.       POC Glucose Once [[id number]]  (Abnormal) Collected:  11/17/18 1927    Specimen:  Blood Updated:  11/17/18 2125     Glucose 144 mg/dL      Comment: RN NotifiedOperator: [id number] BG ELRITAMeter ID: [id number]       POC Glucose Once [[id number]]  (Abnormal) Collected:  11/17/18 1544    Specimen:  Blood Updated:  11/17/18 1602     Glucose 264 mg/dL      Comment: RN NotifiedOperator: [id number] TOMA TIFFANYMeter ID: [id number]       POC Glucose Once [[id number]]  (Normal) Collected:  11/17/18 1205    Specimen:  Blood Updated:  11/17/18 1312     Glucose 118 mg/dL      Comment: RN NotifiedOperator: [id number] TOMA TIFFANYMeter ID: [id number]       Vitamin B12 [[id number]]  (Normal) Collected:  11/17/18 0638    Specimen:  Blood Updated:  11/17/18 0911     Vitamin B-12 555 pg/mL     Folate [[id number]]  (Normal) Collected:  11/17/18 0638    Specimen:  Blood Updated:  11/17/18 0911     Folate 8.35 ng/mL           Culture Data:   No results found for: BLOODCX  No results found for: URINECX  No results found for: RESPCX  No results found for: WOUNDCX  No results found for: STOOLCX  No components found for: BODYFLD    Radiology Data:   Imaging Results (last 24 hours)     ** No results found for the last 24 hours. **          I have reviewed the patient's current medications.     Assessment/Plan     Active Hospital Problems    Diagnosis   • **Congestive heart failure (CMS/HCC)     Added automatically from request for surgery [id number]     • CHF (congestive heart failure) (CMS/HCC)     1. A/C sys CHF: worse. received dialysis yesterday.  Also will  need AICD before discharge     2. Hypertensive crisis: improved, blood pressure better today  3. A/CKD: worse.  Dialysis today.  Per nephrology  4. NSTEMI: s/p LHC with severe CAD and cardiology recommended medical management.  5. IDDM: borderline low sugars.  Stop long acting. continue SSI  6. Right hip pain- will start on norco prn                Tristan Noonan MD   11/18/18   9:01 AM

## 2018-11-18 NOTE — PLAN OF CARE
Problem: Patient Care Overview  Goal: Plan of Care Review  Outcome: Ongoing (interventions implemented as appropriate)      Problem: Cardiac: Heart Failure (Adult)  Goal: Signs and Symptoms of Listed Potential Problems Will be Absent, Minimized or Managed (Cardiac: Heart Failure)  Outcome: Ongoing (interventions implemented as appropriate)      Problem: Hypertensive Disease/Crisis (Arterial) (Adult)  Goal: Signs and Symptoms of Listed Potential Problems Will be Absent, Minimized or Managed (Hypertensive Disease/Crisis)  Outcome: Ongoing (interventions implemented as appropriate)      Problem: Diabetes, Type 2 (Adult)  Goal: Signs and Symptoms of Listed Potential Problems Will be Absent, Minimized or Managed (Diabetes, Type 2)  Outcome: Ongoing (interventions implemented as appropriate)

## 2018-11-18 NOTE — PLAN OF CARE
Problem: Patient Care Overview  Goal: Plan of Care Review   11/18/18 2274   Coping/Psychosocial   Plan of Care Reviewed With patient   Plan of Care Review   Progress no change   OTHER   Outcome Summary Pt c/o pain from R hip and leg post HC. Pt also c/o nausea and 1 episode of vomiting today. Pt scheduled for Dialsyis in am.       Problem: Cardiac: Heart Failure (Adult)  Goal: Signs and Symptoms of Listed Potential Problems Will be Absent, Minimized or Managed (Cardiac: Heart Failure)  Outcome: Ongoing (interventions implemented as appropriate)      Problem: Hypertensive Disease/Crisis (Arterial) (Adult)  Goal: Signs and Symptoms of Listed Potential Problems Will be Absent, Minimized or Managed (Hypertensive Disease/Crisis)  Outcome: Ongoing (interventions implemented as appropriate)      Problem: Diabetes, Type 2 (Adult)  Goal: Signs and Symptoms of Listed Potential Problems Will be Absent, Minimized or Managed (Diabetes, Type 2)  Outcome: Ongoing (interventions implemented as appropriate)

## 2018-11-19 ENCOUNTER — APPOINTMENT (OUTPATIENT)
Dept: ULTRASOUND IMAGING | Facility: HOSPITAL | Age: 53
End: 2018-11-19

## 2018-11-19 ENCOUNTER — PREP FOR SURGERY (OUTPATIENT)
Dept: OTHER | Facility: HOSPITAL | Age: 53
End: 2018-11-19

## 2018-11-19 ENCOUNTER — APPOINTMENT (OUTPATIENT)
Dept: GENERAL RADIOLOGY | Facility: HOSPITAL | Age: 53
End: 2018-11-19

## 2018-11-19 ENCOUNTER — ANESTHESIA EVENT (OUTPATIENT)
Dept: CARDIOLOGY | Facility: HOSPITAL | Age: 53
End: 2018-11-19

## 2018-11-19 DIAGNOSIS — I25.5 ISCHEMIC CARDIOMYOPATHY: Primary | ICD-10-CM

## 2018-11-19 PROBLEM — N18.6 ESRF (END STAGE RENAL FAILURE) (HCC): Chronic | Status: ACTIVE | Noted: 2018-11-19

## 2018-11-19 PROBLEM — I25.10 CAD (CORONARY ARTERY DISEASE): Chronic | Status: ACTIVE | Noted: 2018-11-19

## 2018-11-19 LAB
ANION GAP SERPL CALCULATED.3IONS-SCNC: 13 MMOL/L (ref 5–15)
BUN BLD-MCNC: 71 MG/DL (ref 7–21)
BUN/CREAT SERPL: 8.6 (ref 7–25)
CALCIUM SPEC-SCNC: 8.2 MG/DL (ref 8.4–10.2)
CHLORIDE SERPL-SCNC: 94 MMOL/L (ref 95–110)
CO2 SERPL-SCNC: 26 MMOL/L (ref 22–31)
CREAT BLD-MCNC: 8.21 MG/DL (ref 0.7–1.3)
GFR SERPL CREATININE-BSD FRML MDRD: 8 ML/MIN/1.73 (ref 56–130)
GFR SERPL CREATININE-BSD FRML MDRD: ABNORMAL ML/MIN/1.73 (ref 56–130)
GLUCOSE BLD-MCNC: 206 MG/DL (ref 60–100)
GLUCOSE BLDC GLUCOMTR-MCNC: 127 MG/DL (ref 70–130)
GLUCOSE BLDC GLUCOMTR-MCNC: 138 MG/DL (ref 70–130)
GLUCOSE BLDC GLUCOMTR-MCNC: 180 MG/DL (ref 70–130)
GLUCOSE BLDC GLUCOMTR-MCNC: 197 MG/DL (ref 70–130)
POTASSIUM BLD-SCNC: 5.4 MMOL/L (ref 3.5–5.1)
SODIUM BLD-SCNC: 133 MMOL/L (ref 137–145)

## 2018-11-19 PROCEDURE — 63710000001 INSULIN ASPART PER 5 UNITS: Performed by: FAMILY MEDICINE

## 2018-11-19 PROCEDURE — 25010000002 MIDAZOLAM PER 1 MG: Performed by: THORACIC SURGERY (CARDIOTHORACIC VASCULAR SURGERY)

## 2018-11-19 PROCEDURE — C1750 CATH, HEMODIALYSIS,LONG-TERM: HCPCS | Performed by: THORACIC SURGERY (CARDIOTHORACIC VASCULAR SURGERY)

## 2018-11-19 PROCEDURE — 25010000002 DOXERCALCIFEROL PER 1 MCG: Performed by: INTERNAL MEDICINE

## 2018-11-19 PROCEDURE — 99233 SBSQ HOSP IP/OBS HIGH 50: CPT | Performed by: INTERNAL MEDICINE

## 2018-11-19 PROCEDURE — 77001 FLUOROGUIDE FOR VEIN DEVICE: CPT | Performed by: THORACIC SURGERY (CARDIOTHORACIC VASCULAR SURGERY)

## 2018-11-19 PROCEDURE — 82962 GLUCOSE BLOOD TEST: CPT

## 2018-11-19 PROCEDURE — 76937 US GUIDE VASCULAR ACCESS: CPT

## 2018-11-19 PROCEDURE — 36558 INSERT TUNNELED CV CATH: CPT | Performed by: THORACIC SURGERY (CARDIOTHORACIC VASCULAR SURGERY)

## 2018-11-19 PROCEDURE — 80048 BASIC METABOLIC PNL TOTAL CA: CPT | Performed by: INTERNAL MEDICINE

## 2018-11-19 PROCEDURE — C1894 INTRO/SHEATH, NON-LASER: HCPCS | Performed by: THORACIC SURGERY (CARDIOTHORACIC VASCULAR SURGERY)

## 2018-11-19 PROCEDURE — 5A1D70Z PERFORMANCE OF URINARY FILTRATION, INTERMITTENT, LESS THAN 6 HOURS PER DAY: ICD-10-PCS | Performed by: FAMILY MEDICINE

## 2018-11-19 PROCEDURE — 25010000002 FENTANYL CITRATE (PF) 100 MCG/2ML SOLUTION: Performed by: THORACIC SURGERY (CARDIOTHORACIC VASCULAR SURGERY)

## 2018-11-19 PROCEDURE — 25010000002 NA FERRIC GLUC CPLX PER 12.5 MG: Performed by: INTERNAL MEDICINE

## 2018-11-19 PROCEDURE — 25010000002 HEPARIN (PORCINE) PER 1000 UNITS: Performed by: INTERNAL MEDICINE

## 2018-11-19 RX ORDER — SODIUM CHLORIDE 0.9 % (FLUSH) 0.9 %
3-10 SYRINGE (ML) INJECTION AS NEEDED
Status: CANCELLED | OUTPATIENT
Start: 2018-11-20

## 2018-11-19 RX ORDER — SODIUM CHLORIDE 9 MG/ML
50 INJECTION, SOLUTION INTRAVENOUS CONTINUOUS
Status: CANCELLED | OUTPATIENT
Start: 2018-11-20

## 2018-11-19 RX ORDER — BUPIVACAINE HCL/0.9 % NACL/PF 0.1 %
2 PLASTIC BAG, INJECTION (ML) EPIDURAL ONCE
Status: CANCELLED | OUTPATIENT
Start: 2018-11-20

## 2018-11-19 RX ORDER — ACETAMINOPHEN AND CODEINE PHOSPHATE 300; 30 MG/1; MG/1
2 TABLET ORAL EVERY 6 HOURS PRN
Status: DISCONTINUED | OUTPATIENT
Start: 2018-11-19 | End: 2018-11-21 | Stop reason: HOSPADM

## 2018-11-19 RX ORDER — CLOPIDOGREL BISULFATE 75 MG/1
75 TABLET ORAL DAILY
Status: DISCONTINUED | OUTPATIENT
Start: 2018-11-19 | End: 2018-11-21 | Stop reason: HOSPADM

## 2018-11-19 RX ORDER — SODIUM CHLORIDE 0.9 % (FLUSH) 0.9 %
3 SYRINGE (ML) INJECTION EVERY 12 HOURS SCHEDULED
Status: CANCELLED | OUTPATIENT
Start: 2018-11-20

## 2018-11-19 RX ORDER — ISOSORBIDE MONONITRATE 30 MG/1
30 TABLET, EXTENDED RELEASE ORAL
Status: DISCONTINUED | OUTPATIENT
Start: 2018-11-19 | End: 2018-11-21 | Stop reason: HOSPADM

## 2018-11-19 RX ORDER — LABETALOL 100 MG/1
100 TABLET, FILM COATED ORAL EVERY 12 HOURS SCHEDULED
Status: DISCONTINUED | OUTPATIENT
Start: 2018-11-19 | End: 2018-11-21 | Stop reason: HOSPADM

## 2018-11-19 RX ORDER — HEPARIN SODIUM 1000 [USP'U]/ML
2000 INJECTION, SOLUTION INTRAVENOUS; SUBCUTANEOUS AS NEEDED
Status: DISCONTINUED | OUTPATIENT
Start: 2018-11-19 | End: 2018-11-20

## 2018-11-19 RX ORDER — FENTANYL CITRATE 50 UG/ML
INJECTION, SOLUTION INTRAMUSCULAR; INTRAVENOUS AS NEEDED
Status: DISCONTINUED | OUTPATIENT
Start: 2018-11-19 | End: 2018-11-19 | Stop reason: HOSPADM

## 2018-11-19 RX ORDER — MIDAZOLAM HYDROCHLORIDE 1 MG/ML
INJECTION INTRAMUSCULAR; INTRAVENOUS AS NEEDED
Status: DISCONTINUED | OUTPATIENT
Start: 2018-11-19 | End: 2018-11-19 | Stop reason: HOSPADM

## 2018-11-19 RX ORDER — CYCLOBENZAPRINE HCL 5 MG
5 TABLET ORAL 3 TIMES DAILY PRN
Status: DISCONTINUED | OUTPATIENT
Start: 2018-11-19 | End: 2018-11-21 | Stop reason: HOSPADM

## 2018-11-19 RX ORDER — DIGOXIN 125 MCG
125 TABLET ORAL
Status: DISCONTINUED | OUTPATIENT
Start: 2018-11-19 | End: 2018-11-19

## 2018-11-19 RX ORDER — ALBUMIN (HUMAN) 12.5 G/50ML
12.5 SOLUTION INTRAVENOUS AS NEEDED
Status: ACTIVE | OUTPATIENT
Start: 2018-11-19 | End: 2018-11-19

## 2018-11-19 RX ADMIN — SODIUM CHLORIDE, PRESERVATIVE FREE 3 ML: 5 INJECTION INTRAVENOUS at 23:04

## 2018-11-19 RX ADMIN — SODIUM CHLORIDE, PRESERVATIVE FREE 3 ML: 5 INJECTION INTRAVENOUS at 11:30

## 2018-11-19 RX ADMIN — ACETAMINOPHEN AND CODEINE PHOSPHATE 2 TABLET: 300; 30 TABLET ORAL at 17:52

## 2018-11-19 RX ADMIN — ISOSORBIDE MONONITRATE 30 MG: 30 TABLET, EXTENDED RELEASE ORAL at 12:27

## 2018-11-19 RX ADMIN — DIGOXIN 125 MCG: 125 TABLET ORAL at 12:27

## 2018-11-19 RX ADMIN — LISINOPRIL 20 MG: 20 TABLET ORAL at 23:03

## 2018-11-19 RX ADMIN — ACETAMINOPHEN AND CODEINE PHOSPHATE 2 TABLET: 300; 30 TABLET ORAL at 23:22

## 2018-11-19 RX ADMIN — MELATONIN 6 MG: 3 TAB ORAL at 23:03

## 2018-11-19 RX ADMIN — INSULIN ASPART 3 UNITS: 100 INJECTION, SOLUTION INTRAVENOUS; SUBCUTANEOUS at 23:03

## 2018-11-19 RX ADMIN — LABETALOL HYDROCHLORIDE 100 MG: 100 TABLET, FILM COATED ORAL at 23:03

## 2018-11-19 RX ADMIN — CLOPIDOGREL BISULFATE 75 MG: 75 TABLET ORAL at 13:40

## 2018-11-19 RX ADMIN — SODIUM CHLORIDE, PRESERVATIVE FREE 3 ML: 5 INJECTION INTRAVENOUS at 11:29

## 2018-11-19 RX ADMIN — CYCLOBENZAPRINE HYDROCHLORIDE 5 MG: 5 TABLET, FILM COATED ORAL at 12:27

## 2018-11-19 RX ADMIN — SODIUM CHLORIDE 125 MG: 9 INJECTION, SOLUTION INTRAVENOUS at 09:30

## 2018-11-19 RX ADMIN — HEPARIN SODIUM 2000 UNITS: 1000 INJECTION, SOLUTION INTRAVENOUS; SUBCUTANEOUS at 11:07

## 2018-11-19 RX ADMIN — RENAGEL 800 MG: 800 TABLET ORAL at 17:20

## 2018-11-19 RX ADMIN — DOXERCALCIFEROL 2 MCG: 4 INJECTION, SOLUTION INTRAVENOUS at 09:30

## 2018-11-19 NOTE — PLAN OF CARE
Problem: Patient Care Overview  Goal: Plan of Care Review  Outcome: Ongoing (interventions implemented as appropriate)      Problem: Hypertensive Disease/Crisis (Arterial) (Adult)  Goal: Signs and Symptoms of Listed Potential Problems Will be Absent, Minimized or Managed (Hypertensive Disease/Crisis)  Outcome: Ongoing (interventions implemented as appropriate)      Problem: Diabetes, Type 2 (Adult)  Goal: Signs and Symptoms of Listed Potential Problems Will be Absent, Minimized or Managed (Diabetes, Type 2)  Outcome: Ongoing (interventions implemented as appropriate)

## 2018-11-19 NOTE — PROGRESS NOTES
Progress Note  Kee Crockett MD  Hospitalist    Date of visit: 11/19/2018     LOS: 4 days   Patient Care Team:  John Hyde MD as PCP - General (Family Medicine)    Chief Complaint: dyspnea    Subjective     Interval History:     Patient Complaints: dyspnea / some better after dialysis    History taken from: patient    Medication Review:   Current Facility-Administered Medications   Medication Dose Route Frequency Provider Last Rate Last Dose   • acetaminophen (TYLENOL) tablet 650 mg  650 mg Oral Q6H PRN Jethro Borges DO   650 mg at 11/18/18 1509   • acetaminophen-codeine (TYLENOL #3) 300-30 MG per tablet 2 tablet  2 tablet Oral Q6H PRN Kee Crockett MD   2 tablet at 11/19/18 1752   • albumin human 25 % IV SOLN 12.5 g  12.5 g Intravenous PRN Mark Crawley MD       • clopidogrel (PLAVIX) tablet 75 mg  75 mg Oral Daily Robin Yi MD   75 mg at 11/19/18 1340   • cyclobenzaprine (FLEXERIL) tablet 5 mg  5 mg Oral TID PRN Kee Crockett MD   5 mg at 11/19/18 1227   • dextrose (D50W) 25 g/ 50mL Intravenous Solution 25 g  25 g Intravenous Q15 Min PRN Jethro Borges DO       • dextrose (GLUTOSE) oral gel 15 g  15 g Oral Q15 Min PRN Jethro Borges DO       • doxercalciferol (HECTOROL) injection 2 mcg  2 mcg Intravenous Once per day on Mon Wed Fri Mark Crawley MD   2 mcg at 11/19/18 0930   • famotidine (PEPCID) tablet 40 mg  40 mg Oral Daily Jethro Borges DO   40 mg at 11/18/18 0806   • ferric gluconate (FERRLECIT) 125 mg in sodium chloride 0.9 % 110 mL IVPB  125 mg Intravenous Once per day on Mon Wed Fri Mark Crawley  mL/hr at 11/19/18 0930 125 mg at 11/19/18 0930   • glucagon (human recombinant) (GLUCAGEN DIAGNOSTIC) injection 1 mg  1 mg Subcutaneous PRN Jethro Borges DO       • heparin (porcine) injection 2,000 Units  2,000 Units Intracatheter PRN Mark Crawley MD       • HYDROcodone-acetaminophen (NORCO) 7.5-325 MG per tablet 1 tablet  1 tablet Oral Q6H  PRN Tristan Noonan MD   1 tablet at 11/18/18 1827   • insulin aspart (novoLOG) injection 0-14 Units  0-14 Units Subcutaneous 4x Daily AC & at Bedtime Jethro Borges DO   3 Units at 11/18/18 2024   • isosorbide mononitrate (IMDUR) 24 hr tablet 30 mg  30 mg Oral Q24H Robin Yi MD   30 mg at 11/19/18 1227   • labetalol (NORMODYNE) tablet 100 mg  100 mg Oral Q12H Mark Crawley MD       • lisinopril (PRINIVIL,ZESTRIL) tablet 20 mg  20 mg Oral Nightly Mark Crawley MD   20 mg at 11/18/18 2026   • melatonin tablet 6 mg  6 mg Oral Nightly Jethro Borges DO   6 mg at 11/18/18 2025   • muscle rub (Chris-Monae) 10-15 % cream   Topical Q1H PRN Tristan Noonan MD       • ondansetron (ZOFRAN) injection 4 mg  4 mg Intravenous Q6H PRN Tristan Noonan MD   4 mg at 11/18/18 2322   • sevelamer (RENAGEL) tablet 800 mg  800 mg Oral TID With Meals Mark Crawley MD   800 mg at 11/19/18 1720   • sodium chloride 0.9 % flush 1-10 mL  1-10 mL Intravenous PRN Robin Yi MD       • sodium chloride 0.9 % flush 3 mL  3 mL Intravenous Q12H Jethro Borges DO   3 mL at 11/19/18 1129   • sodium chloride 0.9 % flush 3 mL  3 mL Intravenous Q12H Robin Yi MD   3 mL at 11/19/18 1130   • sodium chloride 0.9 % flush 3-10 mL  3-10 mL Intravenous PRN Jethro Borges DO       • Vitamin D3 50,000 Units  50,000 Units Oral Q7 Days Makr Crawley MD   50,000 Units at 11/16/18 1349       Review of Systems:   Review of Systems   Constitutional: Positive for fatigue. Negative for fever.   Respiratory: Positive for shortness of breath. Negative for cough, wheezing and stridor.    Gastrointestinal: Positive for nausea. Negative for abdominal distention, abdominal pain, anal bleeding, diarrhea, rectal pain and vomiting.   Genitourinary: Negative for discharge, enuresis, frequency, genital sores, penile swelling and urgency.   Musculoskeletal: Positive for back pain. Negative for arthralgias and gait problem.    Skin: Positive for pallor. Negative for color change and rash.   Neurological: Positive for weakness. Negative for tremors, seizures, facial asymmetry and headaches.   Psychiatric/Behavioral: Positive for behavioral problems. Negative for agitation and confusion.   All other systems reviewed and are negative.      Objective     Vital Signs  Temp:  [97.3 °F (36.3 °C)-98.7 °F (37.1 °C)] 98.7 °F (37.1 °C)  Heart Rate:  [73-86] 84  Resp:  [18] 18  BP: (107-134)/(62-74) 118/74    Physical Exam:  Physical Exam   Constitutional: He is oriented to person, place, and time. He appears ill. No distress.   HENT:   Head: Normocephalic and atraumatic.   Eyes: EOM are normal. Pupils are equal, round, and reactive to light.   Neck: Normal range of motion. Neck supple.   Cardiovascular: Normal rate and regular rhythm.   Pulmonary/Chest: Effort normal and breath sounds normal. No stridor. No respiratory distress.   Abdominal: Soft. Bowel sounds are normal. He exhibits no distension. There is no tenderness.   Musculoskeletal: Normal range of motion. He exhibits no edema or tenderness.   Neurological: He is alert and oriented to person, place, and time. No cranial nerve deficit. Coordination normal.   Skin: Skin is warm and dry. No rash noted. There is pallor.   Psychiatric: He has a normal mood and affect. His behavior is normal.   Vitals reviewed.       Results Review:    Lab Results (last 24 hours)     Procedure Component Value Units Date/Time    POC Glucose Once [[id number]]  (Abnormal) Collected:  11/19/18 1528    Specimen:  Blood Updated:  11/19/18 1646     Glucose 197 mg/dL      Comment: RN NotifiedOperator: [id number] ELOISE GlympseYMeter ID: [id number]       POC Glucose Once [[id number]]  (Normal) Collected:  11/19/18 1123    Specimen:  Blood Updated:  11/19/18 1229     Glucose 127 mg/dL      Comment: RN NotifiedOperator: [id number] ELOISE BRANDYMeter ID: [id number]       Basic Metabolic Panel [[id number]]  (Abnormal) Collected:   11/19/18 0844    Specimen:  Blood Updated:  11/19/18 0912     Glucose 206 mg/dL      BUN 71 mg/dL      Creatinine 8.21 mg/dL      Sodium 133 mmol/L      Potassium 5.4 mmol/L      Chloride 94 mmol/L      CO2 26.0 mmol/L      Calcium 8.2 mg/dL      eGFR  African Amer 8 mL/min/1.73      Comment: <15 Indicative of kidney failure.        eGFR Non African Amer -- mL/min/1.73      Comment: <15 Indicative of kidney failure.        BUN/Creatinine Ratio 8.6     Anion Gap 13.0 mmol/L     POC Glucose Once [[id number]]  (Abnormal) Collected:  11/19/18 0615    Specimen:  Blood Updated:  11/19/18 0646     Glucose 138 mg/dL      Comment: RN NotifiedOperator: [id number] VIKA HWTLINMeter ID: [id number]       POC Glucose Once [[id number]]  (Abnormal) Collected:  11/18/18 1944    Specimen:  Blood Updated:  11/18/18 2104     Glucose 199 mg/dL      Comment: RN NotifiedOperator: [id number] VIKA HWTLINMeter ID: [id number]             Imaging Results (last 24 hours)     Procedure Component Value Units Date/Time    XR Chest Post CVA Port [[id number]] Collected:  11/19/18 1655     Updated:  11/19/18 1719    Narrative:         EXAM:         Radiograph(s), Chest   VIEWS:   Frontal  ; 1       DATE/TIME:  11/19/2018 5:16 PM CST                INDICATION:   tunnel cath, I50.9 Heart failure, unspecified  I13.11 Hypertensive heart and chronic kidney disease without  heart failure, with stage 5 chronic kidney disease, or end stage  renal disease N18.5 Chronic kidney disease, stage 5 N18.4 Chronic  kidney disease, stage 4 (severe) I25.5 Ischemic cardiomyopathy    COMPARISON:  CXR: 11/15/18             FINDINGS:             - lines/tubes:    There is a right approach tunneled catheter in  standard position.     - cardiac:         Moderate cardiac silhouette enlargement,  unchanged.         - mediastinum: contour within normal limits         - lungs:         no focal air space process, pulmonary  interstitial edema, nodule(s)/mass             -  pleura:         No evidence of a pneumothorax.                  - osseous:         unremarkable for age                  - misc.:         Impression:       CONCLUSION:        1. Stable post procedural examination.                                                             Electronically signed by:  ALISHA Haro MD  11/19/2018 5:17  PM CST Workstation: [id number]    US Guided Vascular Access [[id number]] Resulted:  11/19/18 1700     Updated:  11/19/18 1700    Narrative:       This procedure was auto-finalized with no dictation required.    IR insert non-tunneled CVC 5+ [[id number]] Resulted:  11/19/18 1648     Updated:  11/19/18 1649    Narrative:       OPERATIVE NOTE  Jamil Cordon Olivo  1965 11/14/2018 - 11/16/2018  PREOP DIAGNOSES:  Acute kidney injury requiring short term hemodialysis.    POSTOP DIAGNOSES:   Acute kidney injury requiring short term hemodialysis.    PROCEDURE:   Placement non-tunneled central venous catheter (13Fr Trialysis)  Vascular ultrasound guidance    SURGEON:  Bravo Conway MD    ASSISTANT:  Sania SHARPE     ANESTHESIA: Local 1% lidocaine    COMPLICATIONS: None    DESCRIPTION OF OPERATION: In Angio suite, patient placed in supine   position, prepped and draped in sterile fashion.  Vascular ultrasound was   used to identify the right common femoral vein which was 10mm in diameter   and patent.  Cannulated via modified Seldinger technique with mini stick   under ultrasound guidance.  Exchanged for a 0.035 guidewire and serial   dilators.  A 13Fr Trialysis catheter was placed, aspirated and flushed   without difficulty with Hep-Lock solution.  1000U/mL heparin instilled   into lumens. Catheter secured to the skin with 2-0 Nylon suture.  Sterile   dressing applied. Patient tolerated procedure well. Dialysis notified.                   Assessment/Plan       Acute on chronic systolic heart failure (CMS/HCC)    Diabetes mellitus (CMS/HCC)    ESRF (end stage renal failure) (CMS/HCC)     CAD (coronary artery disease)    Hypertension    Ischemic cardiomyopathy    Continue with dialysis / scheduled for AICD placement tomorrow.    Kee Crockett MD  11/19/18  7:43 PM

## 2018-11-19 NOTE — PROGRESS NOTES
"[facility]  [address] [place] [address]. [address]   - [phone]  F - [phone]     Progress Note          PATIENT  DEMOGRAPHICS   PATIENT NAME: Jamil Olivo                      PHYSICIAN: Mark Crawley MD  : 1965  MRN: [id number]   LOS: 4 days    Patient Care Team:  John Hyde MD as PCP - General (Family Medicine)  Subjective   SUBJECTIVE   On dialysis now - tolerating it well bp is stable         Objective   OBJECTIVE   Vital Signs  Temp:  [97.3 °F (36.3 °C)-98.7 °F (37.1 °C)] 98.7 °F (37.1 °C)  Heart Rate:  [73-86] 84  Resp:  [18] 18  BP: ()/(57-70) 120/70    Flowsheet Rows      First Filed Value   Admission Height  180.3 cm (71\") Documented at 2018 2335   Admission Weight  106 kg (232 lb 9.6 oz) Documented at 2018 2335           I/O last 3 completed shifts:  In: 1770 [P.O.:1770]  Out: 200 [Urine:200]    PHYSICAL EXAM    Physical Exam   Constitutional: He is oriented to person, place, and time. He appears well-developed.   HENT:   Head: Normocephalic.   Eyes: Pupils are equal, round, and reactive to light.   Neck: JVD present.   Cardiovascular: Normal rate, regular rhythm and normal heart sounds.   Pulmonary/Chest: Effort normal and breath sounds normal.   Abdominal: Soft. Bowel sounds are normal.   Musculoskeletal: He exhibits edema.   Neurological: He is alert and oriented to person, place, and time.       RESULTS   Results Review:    Results from last 7 days   Lab Units  18   0844  18   0526  18   0638   SODIUM mmol/L  133*  133*  134*   POTASSIUM mmol/L  5.4*  4.3  4.2   CHLORIDE mmol/L  94*  98  96   CO2 mmol/L  26.0  27.0  26.0   BUN mg/dL  71*  53*  73*   CREATININE mg/dL  8.21*  5.89*  5.82*   CALCIUM mg/dL  8.2*  8.2*  7.9*   GLUCOSE mg/dL  206*  104*  123*       Estimated Creatinine Clearance: 12.5 mL/min (A) (by C-G formula based on SCr of 8.21 mg/dL (H)).    Results from last 7 days   Lab Units  18   " 0526  11/17/18   0638  11/16/18   0541  11/15/18   0517   MAGNESIUM mg/dL   --    --    --   2.5*   PHOSPHORUS mg/dL  6.4*  5.8*  6.1*  5.2*             Results from last 7 days   Lab Units  11/17/18   0638  11/16/18   1343  11/16/18   0541  11/15/18   0141   WBC 10*3/mm3  8.22  8.21  7.01  10.20*   HEMOGLOBIN g/dL  10.4*  10.7*  10.3*  11.5*   PLATELETS 10*3/mm3  297  302  298  315       Results from last 7 days   Lab Units  11/16/18   1343   INR   1.14         Imaging Results (last 24 hours)     ** No results found for the last 24 hours. **           MEDICATIONS      doxercalciferol 2 mcg Intravenous Once per day on Mon Wed Fri   famotidine 40 mg Oral Daily   ferric gluconate (FERRLECIT) IVPB 125 mg Intravenous Once per day on Mon Wed Fri   insulin aspart 0-14 Units Subcutaneous 4x Daily AC & at Bedtime   labetalol 300 mg Oral Q12H   lisinopril 20 mg Oral Nightly   melatonin 6 mg Oral Nightly   sevelamer 800 mg Oral TID With Meals   sodium chloride 3 mL Intravenous Q12H   sodium chloride 3 mL Intravenous Q12H   Vitamin D3 50,000 Units Oral Q7 Days          Assessment/Plan   ASSESSMENT / PLAN      Congestive heart failure (CMS/[facility])    CHF (congestive heart failure) (CMS/[facility])    1.CKD 5 with the worsening creatinine and now fluid overload with the pulmonary edema and anasarca / ESRD.  Patient creatinine is up to 6.3 peak. We started dialysis on 11/16/18. Hd m/w/f. Plan for tunnel cath and then discharge. Out pt arrangement is pending. PD catheter and training later.      13 gm of protein. 24 hr cr cl is 12 ml/min.     2.hypertension poorly controlled. Relatively better and on low side lower labetalol to 200mg bid. Keep lisinopril off hydralazine and amlodipine and tenex.      3.CKD/minimal and bone disorder.  We'll do a renal diet for now.  keep renagel, vitamin d and hectorol    4. Anemia of ckd low t sat - on iv fe with hd.     5. CAD ICM EF 20%. 100% occlusion RCA and CX. Plan for AICD                This  document has been electronically signed by Mark Crawley MD on November 19, 2018 9:34 AM

## 2018-11-19 NOTE — PROGRESS NOTES
Cardiology Progress Note     LOS: 4 days   Patient Care Team:  John Hyde MD as PCP - General (Family Medicine)    Subjective:    Chart reviewed. Patient seen and examined. Patient just returned from hemodialysis.  Patient complains of generalized fatigue weakness and hip discomfort.  Patient denies any symptoms of chest pain or shortness of breath.  Patient denies any lower extremity edema or any palpitation.  Patient telemetry monitor has not revealed off any evidence of any arrhythmia.  Patient is to undergo AICD placement by Dr. Haque.    Objective:  Temp:  [97.3 °F (36.3 °C)-98.7 °F (37.1 °C)] 98.2 °F (36.8 °C)  Heart Rate:  [73-86] 84  Resp:  [18] 18  BP: ()/(57-73) 134/73    Intake/Output Summary (Last 24 hours) at 11/19/2018 1136  Last data filed at 11/19/2018 0400  Gross per 24 hour   Intake 1290 ml   Output --   Net 1290 ml       Physical Exam:   General Appearance:    Alert, oriented, cooperative, in no acute distress.   Head:    Normocephalic, atraumatic, without obvious abnormality   Eyes:              ADIN. Lids and lashes normal, conjunctivae and sclerae normal, no icterus, no pallor.   Ears:    Ears appear intact with no abnormalities noted.   Throat:   Mucous membranes pink and moist.   Neck:   Supple, trachea midline, no carotid bruit, no organomegaly or JVD.   Lungs:     Clear to auscultation and percussion, respirations. regular, even and unlabored. No wheezes, rales, or rhonchi.    Heart:    Regular rhythm and normal rate, normal S1 and S2, no      murmur, no gallop, no rub, no click.   Abdomen:     Soft, non-tender, non-distended, no guarding, no rebound tenderness. Normal bowel sounds in all four quadrants, no masses, liver and spleen nonpalpable.    Genitalia:    Deferred.   Extremities:   Moves all extremities well, no edema, no cyanosis, no       redness, no clubbing.   Pulses:   Pulses palpable and equal bilaterally.   Skin:   Moist and warm. No bleeding, bruising or rash.    Neurologic/Psychiatric:   Alert and oriented to person, place, and time.  Motor, power and tone in upper and lower extremities are grossly intact.  No focal neurological deficits. Normal cognitive function. No psychomotor reaction or tangential thought. No depression, homicidal ideations and suicidal ideations.          Results Review:    Results from last 7 days   Lab Units  11/19/18   0844   SODIUM mmol/L  133*   POTASSIUM mmol/L  5.4*   CHLORIDE mmol/L  94*   CO2 mmol/L  26.0   BUN mg/dL  71*   CREATININE mg/dL  8.21*   CALCIUM mg/dL  8.2*   GLUCOSE mg/dL  206*     Results from last 7 days   Lab Units  11/15/18   0704  11/15/18   0517  11/15/18   0141   TROPONIN I ng/mL  0.081*  0.098*  0.092*         Results from last 7 days   Lab Units  11/17/18   0638   WBC 10*3/mm3  8.22   HEMOGLOBIN g/dL  10.4*   HEMATOCRIT %  31.7*   PLATELETS 10*3/mm3  297     Results from last 7 days   Lab Units  11/16/18   1343   INR   1.14     Results from last 7 days   Lab Units  11/15/18   0517   MAGNESIUM mg/dL  2.5*                   ECHO:  Results for orders placed during the hospital encounter of 03/23/17   STAT Adult Transthoracic Echo Complete    Narrative · The left ventricular cavity is moderately dilated.  · Left ventricular wall thickness is consistent with a thin walled   ventricle.  · Left ventricular function is severely decreased.  · Mild to moderate tricuspid valve regurgitation is present.  · Mild mitral valve regurgitation is present  · Left atrial cavity size is borderline dilated.          No orders to display        Medication Review:   Current Facility-Administered Medications   Medication Dose Route Frequency Provider Last Rate Last Dose   • acetaminophen (TYLENOL) tablet 650 mg  650 mg Oral Q6H PRN Jethro Borges DO   650 mg at 11/18/18 1509   • cyclobenzaprine (FLEXERIL) tablet 5 mg  5 mg Oral TID PRN Kee Crockett MD       • dextrose (D50W) 25 g/ 50mL Intravenous Solution 25 g  25 g Intravenous Q15  Min PRN Jethro Borges DO       • dextrose (GLUTOSE) oral gel 15 g  15 g Oral Q15 Min PRN Jethro Borges DO       • doxercalciferol (HECTOROL) injection 2 mcg  2 mcg Intravenous Once per day on Mon Wed Fri Mark Crawley MD   2 mcg at 11/19/18 0930   • famotidine (PEPCID) tablet 40 mg  40 mg Oral Daily Jethro Borges DO   40 mg at 11/18/18 0806   • ferric gluconate (FERRLECIT) 125 mg in sodium chloride 0.9 % 110 mL IVPB  125 mg Intravenous Once per day on Mon Wed Fri Mark Crawley  mL/hr at 11/19/18 0930 125 mg at 11/19/18 0930   • glucagon (human recombinant) (GLUCAGEN DIAGNOSTIC) injection 1 mg  1 mg Subcutaneous PRN Jethro Borges DO       • heparin (porcine) injection 2,000 Units  2,000 Units Intracatheter PRN Mark Crawley MD   2,000 Units at 11/19/18 1107   • HYDROcodone-acetaminophen (NORCO) 7.5-325 MG per tablet 1 tablet  1 tablet Oral Q6H PRN Tristan Noonan MD   1 tablet at 11/18/18 1827   • insulin aspart (novoLOG) injection 0-14 Units  0-14 Units Subcutaneous 4x Daily AC & at Bedtime Jethro Borges DO   3 Units at 11/18/18 2024   • labetalol (NORMODYNE) tablet 100 mg  100 mg Oral Q12H Mark Crawley MD       • lisinopril (PRINIVIL,ZESTRIL) tablet 20 mg  20 mg Oral Nightly Mark Crawley MD   20 mg at 11/18/18 2026   • melatonin tablet 6 mg  6 mg Oral Nightly Jethro Borges DO   6 mg at 11/18/18 2025   • muscle rub (Chris-Monae) 10-15 % cream   Topical Q1H PRN Tristan Noonan MD       • ondansetron (ZOFRAN) injection 4 mg  4 mg Intravenous Q6H PRN Tristan Noonan MD   4 mg at 11/18/18 2322   • sevelamer (RENAGEL) tablet 800 mg  800 mg Oral TID With Meals Mark Crawley MD   800 mg at 11/18/18 1711   • sodium chloride 0.9 % flush 1-10 mL  1-10 mL Intravenous PRN Robin Yi MD       • sodium chloride 0.9 % flush 3 mL  3 mL Intravenous Q12H Jethro Borges DO   3 mL at 11/19/18 1129   • sodium chloride 0.9 % flush 3 mL  3 mL  Intravenous Q12H Robin Yi MD   3 mL at 11/19/18 1130   • sodium chloride 0.9 % flush 3-10 mL  3-10 mL Intravenous PRN Jethro Borges DO       • Vitamin D3 50,000 Units  50,000 Units Oral Q7 Days Mark Crawley MD   50,000 Units at 11/16/18 1349       Assessment and Plan:      Congestive heart failure (CMS/HCC)    CHF (congestive heart failure) (CMS/HCC)  1.  Ischemic cardiomyopathy with left ventricular systolic dysfunction with an ejection fraction of 20-25%.  Patient coronary angiogram done last week had revealed 100% occlusion of the native right coronary artery and circumflex artery.  Patient has not complained of having any symptoms of chest pain suggestive of angina.  Patient at the present time would be treated medically.  Patient is to undergo an AICD placement by Dr. Haque.  Patient would be started on a low dose of aspirin.  Patient would be started on digoxin 0.125 mg daily.  2.  Atherosclerotic coronary artery disease.  Patient has 100% occlusion of the native right coronary artery and 100% occlusion of the circumflex artery.  Patient has not complained of having any symptoms of substernal chest pain suggestive of angina.  Patient would be treated medically.  Patient would be started on isosorbide 30 mg daily  3.  Arterial hypertension.  Patient blood pressure is currently well controlled on the present dose of the lisinopril and the labetalol.  4.  End-stage renal disease.  Patient creatinine is 8.2.  Patient is currently on hemodialysis.  5.  Anemia.  Patient's hemoglobin is 10.4.  Patient has not had any hemoptysis hematuria bright blood per rectum.    The above plan of management were discussed with the patient and family.            Robin Yi MD  11/19/18  11:36 AM      Time: Time spent on face-to-face interaction 20 minutes\    Dictated utilizing Dragon dictation.

## 2018-11-19 NOTE — PLAN OF CARE
Problem: Patient Care Overview  Goal: Plan of Care Review  Outcome: Ongoing (interventions implemented as appropriate)   11/19/18 6237   Coping/Psychosocial   Plan of Care Reviewed With patient   Plan of Care Review   Progress no change   OTHER   Outcome Summary dialysis completed 11/19/2018; 500cc given during dialysis; possible AICD placement tomorrow

## 2018-11-19 NOTE — PROGRESS NOTES
Detailed discussion with Jamil Olivo regarding situation, options and plans.  PREMA requiring intermediate term hemodialysis.  Placement of  tunnelled hemodialysis catheter with cuff is advisable.  Place catheter into vein in neck, tunneled under skin exiting onto anterior chest wall, using ultrasound and fluoroscopic guidance.    Risks including, but not limited to Mortality, Major morbidity <1%, bleeding, transfusion, infection, pneumothorax, blood vessel injury.  Benefits: access for hemodialysis.  Options:  direct cannulation,  tunnel catheter discussed.  Understands and wishes to proceed.      Placement  tunnel catheter.  Vascular ultrasound guidance.  Fluoroscopic guidance.  Local, IV sedation.  11/19/2018

## 2018-11-19 NOTE — CONSULTS
Cardiology at [facility]  Cardiovascular Consultation Note      Jamil Olivo  303/[address]  [id number]  1965    DATE OF ADMISSION: 11/14/2018  DATE OF CONSULTATION:  11/19/2018    John Hyde MD  Treatment Team:   Attending Provider: Jethro Borges DO  Consulting Physician: Robin Yi MD  Consulting Physician: Mark Crawley MD  Consulting Physician: Garcia Haque MD  Surgeon: Robin Yi MD  Admitting Provider: Jethro Borges DO    No chief complaint on file.      Chief complaint/ Reason for Consultation:      History of Present Illness  Patient's Body mass index is 31.36 kg/m². BMI is above normal parameters. Recommendations include: exercise counseling, nutrition counseling and referral to primary care.  53 y.o. male     Body mass index is 32.36 kg/m².  underwent cardiac catheterization with history of congestive heart failure and LV dysfunction.  Catheter revealed chronically occluded RCA and left circumflex with good collateral and no intervention  was performed.  I was requested to evaluate this patient for primary prevention with ischemic  cardiomyopathy and persistent LV dysfunction his ejection fractions in September 2017 was 20%  with a past medical history significant for arterial hypertension, hypertensive heart disease, non-insulin-dependent diabetes, chronic kidney disease secondary to diabetes, history of congestive heart failure, left ventricular systolic dysfunction with an ejection fraction of 25%, mild-to-moderate tricuspid regurgitation, obesity.  patient had undergone a nuclear Cardiolite stress test in July 2017 which was negative for any evidence of any stress-induced ischemia.     Review of the record indicated patient on previous hospitalization had symptoms of chest pain with positive troponin suggestive for non-Q myocardial infarction and was found to have left ventricular systolic dysfunction but did not undergo any invasive  evaluation as the patient had chronic kidney disease but was not on hemodialysis.     Patient now presented to [facility] with increasing shortness of breath and had positive troponin.  Patient was evaluated by nephrology for elevated creatinine and is to undergo hemodialysis. Patient also noted to be in congestive heart failure and currently being dialyzed with improvement in shortness of breath.     Patient 10 point review of system except for what is stated in the history of present illness is negative.              Past Medical History:  1.   Negative Lexiscan Cardiolite stress test for any evidence of any stress-induced ischemia.  2.  Left ventricular systolic dysfunction with an ejection fraction of 20-25%.  3.  Mild-to-moderate tricuspid regurgitation.  4.  Arterial hypertension.  5.  Hypertensive heart disease.  6.  Insulin-requiring diabetes.  7.  Diabetic nephropathy.  8.  Obesity  9.  Chronic kidney disease with a creatinine of 5.9.  10.  Asthmatic bronchitis              Past Surgical History:  1.  Ophthalmic surgery.        Family History:No history of premature atherosclerotic coronary artery disease              Social History: No history of tobacco or alcohol intake.        Cardiac Risk Factors:   1. Male   2. Arterial Hypertension  3. Diabetes  4. Obesity              Impression CARDIAC CATH 11/16/18  1.  100% occluded circumflex artery filling in from left to left collateral.  2.  100% occluded proximal right coronary artery filling in from left-to-right collateral.  3.  No evidence of any obstructive disease noted in the left anterior descending artery.  4.  Left ventricular enlargement with global hypokinesis with an ejection fraction of 20%     Recommendations: Patient currently is not having any symptoms of substernal chest pain.  Patient has ischemic cardiomyopathy and left ventricular systolic dysfunction.  Patient at the present time has been recommended evaluation for an AICD.   Should the patient have symptoms of chest pain patient would be evaluated for percutaneous intervention for the chronic occlusion on the right and the circumflex artery.  Have discussed with Dr. Haque would consider AICD implantation on Monday.  Past Medical History:   Diagnosis Date   • Asthma    • CHF (congestive heart failure) (CMS/[facility])    • Diabetes mellitus (CMS/[facility])    • Diabetic neuropathy (CMS/[facility]) 2014   • Hypertension    • Kidney disease 2018    pt states that he is close to needing to be on dialysis       Past Surgical History:   Procedure Laterality Date   • EYE SURGERY Bilateral 12/2017       Allergies   Allergen Reactions   • Motrin [Ibuprofen] Hives       No current facility-administered medications on file prior to encounter.      Current Outpatient Medications on File Prior to Encounter   Medication Sig Dispense Refill   • acetaminophen (TYLENOL) 325 MG tablet Take 2 tablets by mouth Every 6 (Six) Hours As Needed for Mild Pain (1-3) or Headache. 1 bottle 1   • albuterol (PROVENTIL HFA;VENTOLIN HFA) 108 (90 BASE) MCG/ACT inhaler Inhale 2 puffs Every 4 (Four) Hours As Needed for Wheezing. 1 inhaler 1   • furosemide (LASIX) 40 MG tablet Take 40 mg by mouth 2 (Two) Times a Day.     • guanFACINE (TENEX) 1 MG tablet Take 1 mg by mouth Every Night.     • hydrALAZINE (APRESOLINE) 50 MG tablet Take 75 mg by mouth 3 (Three) Times a Day.     • insulin glargine (LANTUS) 100 UNIT/ML injection Inject 30 Units under the skin into the appropriate area as directed Every Night.     • isosorbide mononitrate (IMDUR) 60 MG 24 hr tablet Take 1 tablet by mouth Daily. (Patient taking differently: Take 10 mg by mouth 3 (Three) Times a Day.) 30 tablet 1   • labetalol (NORMODYNE) 100 MG tablet Take 50 mg by mouth 3 (Three) Times a Day.     • metOLazone (ZAROXOLYN) 10 MG tablet Take 10 mg by mouth Daily.     • amLODIPine (NORVASC) 10 MG tablet Take 10 mg by mouth Daily.     • benazepril (LOTENSIN) 20 MG tablet Take 40 mg by  mouth Daily.     • carvedilol (COREG) 25 MG tablet Take 25 mg by mouth 2 (Two) Times a Day With Meals.     • insulin detemir (LEVEMIR) 100 UNIT/ML injection Inject 30 Units under the skin Every Night. 10 mL 1       Social History     Socioeconomic History   • Marital status:      Spouse name: Not on file   • Number of children: Not on file   • Years of education: Not on file   • Highest education level: Not on file   Social Needs   • Financial resource strain: Not on file   • Food insecurity - worry: Not on file   • Food insecurity - inability: Not on file   • Transportation needs - medical: Not on file   • Transportation needs - non-medical: Not on file   Occupational History   • Not on file   Tobacco Use   • Smoking status: Never Smoker   • Smokeless tobacco: Never Used   Substance and Sexual Activity   • Alcohol use: No   • Drug use: No   • Sexual activity: Defer   Other Topics Concern   • Not on file   Social History Narrative   • Not on file           REVIEW OF SYSTEMS:   ROS  Constitutional: Positive for activity change, appetite change,   HENT: Negative for congestion, ear discharge, ear pain, hearing loss, rhinorrhea, sneezing, sore throat and trouble swallowing.    Eyes: Negative for photophobia, pain, discharge and visual disturbance.   Respiratory: Negative for cough, wheezing.  Positive  For exertional dyspnea functional class II at the time of evaluation  Cardiovascular: Negative for chest pain and palpitations.  positive for swelling of his lower extremities.  Gastrointestinal: Negative for abdominal pain, blood in stool, diarrhea, nausea and vomiting.   Endocrine: Negative for polydipsia and polyuria.   Genitourinary: Negative for difficulty urinating, dysuria.  Skin: Negative for rash.  negative for itching.  Neurological: Negative for dizziness, syncope, weakness         Objective:     Vitals:    11/19/18 0738 11/19/18 0739 11/19/18 0751 11/19/18 1121   BP: 120/70   134/73   BP Location: Left  "arm   Left arm   Patient Position: Lying   Lying   Pulse: 86  84 84   Resp: 18   18   Temp: 98.7 °F (37.1 °C)   98.2 °F (36.8 °C)   TempSrc: Temporal   Temporal   SpO2: (!) 84% 92%  96%   Weight:       Height:         Body mass index is 31.36 kg/m².  Flowsheet Rows      First Filed Value   Admission Height  180.3 cm (71\") Documented at 11/14/2018 2335   Admission Weight  106 kg (232 lb 9.6 oz) Documented at 11/14/2018 2335          Intake/Output Summary (Last 24 hours) at 11/19/2018 1416  Last data filed at 11/19/2018 0400  Gross per 24 hour   Intake 810 ml   Output --   Net 810 ml         Physical Exam   Physical Exam  General:Patient is oriented to person, place, and time. Patient appears well-developed and well-nourished. No distress.   HEENT: Head: Normocephalic and atraumatic. Right Ear: External ear normal.   Left Ear: External ear normal.  Eyes: Conjunctivae and EOM are normal. Right eye exhibits no discharge. Left eye exhibits no discharge.   Neck: Normal range of motion. Neck supple. No JVD present. No tracheal deviation present. No thyromegaly present.   Heart: Normal rate, regular rhythm, normal heart sounds and intact distal pulses.  Exam reveals no gallop and no friction rub. No murmur heard.  Pulmonary: Effort normal and breath sounds normal.  The patient has mild crackles noted at the bases bilaterally.    Abdominal: Soft, Bowel sounds are normal. No distension and no mass. There is no tenderness. There is no rebound and no guarding. No hernia.   Musculoskeletal: Patient has 2+ pitting edema in the feet,ankles extending up the extremities to the level of the proximal calf bilaterally.    Neurological: Patient is alert and oriented to person, place, and time.    Skin: Skin is warm. No rash noted. Patient is not diaphoretic      Radiology Review    US Guided Vascular Access   Final Result      XR Chest 1 View   Final Result   Cardiomegaly.      Electronically signed by:  Reji Avelar MD  11/15/2018 " 11:40 PM   CST Workstation: [id number]      US Renal Bilateral   Final Result   1. There is suggestion of mildly increased renal cortical   echogenicity when compared to the spleen and liver. This suggests   sequela of renal parenchymal disease. No hydronephrosis.   2. Question of mildly increased echogenicity of the liver which   may be related to diffuse hepatic steatosis.   3. Mild bladder wall thickening. This could be related to chronic   bladder outlet obstruction. Acute cystitis would be a less likely   possibility.      Electronically signed by:  Phillip Monae MD  11/15/2018 2:29   PM CST Workstation: [id number]      XR Chest 1 View   Final Result   Cardiomegaly with mild venous congestion      Electronically signed by:  Reji Avelar MD  11/15/2018 1:47 AM   CST Workstation: [id number]      IR insert non-tunneled CVC 5+    (Results Pending)   US Guided Vascular Access    (Results Pending)   XR Chest Post CVA Port    (Results Pending)       Lab Results:  Lab Results (last 24 hours)     Procedure Component Value Units Date/Time    POC Glucose Once [[id number]]  (Normal) Collected:  11/19/18 1123    Specimen:  Blood Updated:  11/19/18 1229     Glucose 127 mg/dL      Comment: RN NotifiedOperator: [id number] ELOISE Sanchez ID: [id number]       Basic Metabolic Panel [[id number]]  (Abnormal) Collected:  11/19/18 0844    Specimen:  Blood Updated:  11/19/18 0912     Glucose 206 mg/dL      BUN 71 mg/dL      Creatinine 8.21 mg/dL      Sodium 133 mmol/L      Potassium 5.4 mmol/L      Chloride 94 mmol/L      CO2 26.0 mmol/L      Calcium 8.2 mg/dL      eGFR  African Amer 8 mL/min/1.73      Comment: <15 Indicative of kidney failure.        eGFR Non African Amer -- mL/min/1.73      Comment: <15 Indicative of kidney failure.        BUN/Creatinine Ratio 8.6     Anion Gap 13.0 mmol/L     POC Glucose Once [[id number]]  (Abnormal) Collected:  11/19/18 0615    Specimen:  Blood Updated:  11/19/18 0646      Glucose 138 mg/dL      Comment: RN NotifiedOperator: [id number] VIKA CAITLINMeter ID: [id number]       POC Glucose Once [[id number]]  (Abnormal) Collected:  11/18/18 1944    Specimen:  Blood Updated:  11/18/18 2104     Glucose 199 mg/dL      Comment: RN NotifiedOperator: [id number] VIKA CAITLINMeter ID: [id number]       POC Glucose Once [[id number]]  (Normal) Collected:  11/18/18 1506    Specimen:  Blood Updated:  11/18/18 1542     Glucose 126 mg/dL      Comment: Result Not ConfirmedOperator: [id number] TOMA ANDERSONeter ID: [id number]             I personally viewed and interpreted the patient's EKG/Telemetry data       Assessment/Plan:       #1 ischemic cardiomyopathy with persistent LV dysfunction #3 congestive heart failure with functional class 2-3 #4-0 chronic renal insufficiency on dialysis #5 hypertension with hypertensive heart disease    53 years old patient known to Dr. Yi with a documented history of persistent LV dysfunction no underwent cardiac catheterization after initiation of dialysis noted a chronically occluded RCA and left circumflex with good collateral.  Given the persistent LV dysfunction and patent medical management was requested to evaluate this patient for ICD implantation for primary prevention.  Agreed with Dr. Yi given the persistent LV dysfunction and ischemic cardiomyopathy.  Family present in the room.  Procedure risk discussed with the patient and the family.  Risk included but nonlimiting infection, bleeding, stroke, pneumothorax, hematoma, myocardial infarction.  Understand willing to proceed forward.  Given normal QRS duration and good sinus rate I will proceed with single-chamber ICD implantations.  At present patient being treated with isosorbide with history of CAD, labetalol and lisinopril for management of hypertension with hypertensive heart disease, insulin for management of diabetes and Plavix with history of CAD.  I will discontinued and digoxin given  the renal failure on dialysis. Risk  Factor lifestyle modification discussed    Thank you for allowing me to participate in the care of Jamil Olivo. Feel free to contact me directly with any further questions or concerns.    Garcia Haque MD  11/19/18  2:16 PM.      EMR Dragon/Transcription disclaimer:   Much of this encounter note is an electronic transcription/translation of spoken language to printed text. The electronic translation of spoken language may permit erroneous, or at times, nonsensical words or phrases to be inadvertently transcribed; Although I have reviewed the note for such errors, some may still exist.

## 2018-11-20 ENCOUNTER — APPOINTMENT (OUTPATIENT)
Dept: GENERAL RADIOLOGY | Facility: HOSPITAL | Age: 53
End: 2018-11-20

## 2018-11-20 ENCOUNTER — ANESTHESIA (OUTPATIENT)
Dept: CARDIOLOGY | Facility: HOSPITAL | Age: 53
End: 2018-11-20

## 2018-11-20 LAB
ANION GAP SERPL CALCULATED.3IONS-SCNC: 10 MMOL/L (ref 5–15)
BUN BLD-MCNC: 59 MG/DL (ref 7–21)
BUN/CREAT SERPL: 7.4 (ref 7–25)
CALCIUM SPEC-SCNC: 7.9 MG/DL (ref 8.4–10.2)
CHLORIDE SERPL-SCNC: 98 MMOL/L (ref 95–110)
CO2 SERPL-SCNC: 27 MMOL/L (ref 22–31)
CREAT BLD-MCNC: 8.01 MG/DL (ref 0.7–1.3)
DEPRECATED RDW RBC AUTO: 45.6 FL (ref 35.1–43.9)
ERYTHROCYTE [DISTWIDTH] IN BLOOD BY AUTOMATED COUNT: 13.9 % (ref 11.5–14.5)
GFR SERPL CREATININE-BSD FRML MDRD: 9 ML/MIN/1.73 (ref 56–130)
GFR SERPL CREATININE-BSD FRML MDRD: ABNORMAL ML/MIN/1.73 (ref 56–130)
GLUCOSE BLD-MCNC: 94 MG/DL (ref 60–100)
GLUCOSE BLDC GLUCOMTR-MCNC: 106 MG/DL (ref 70–130)
GLUCOSE BLDC GLUCOMTR-MCNC: 115 MG/DL (ref 70–130)
GLUCOSE BLDC GLUCOMTR-MCNC: 93 MG/DL (ref 70–130)
HCT VFR BLD AUTO: 32.4 % (ref 39–49)
HGB BLD-MCNC: 10.4 G/DL (ref 13.7–17.3)
MCH RBC QN AUTO: 28.7 PG (ref 26.5–34)
MCHC RBC AUTO-ENTMCNC: 32.1 G/DL (ref 31.5–36.3)
MCV RBC AUTO: 89.5 FL (ref 80–98)
PLATELET # BLD AUTO: 259 10*3/MM3 (ref 150–450)
PMV BLD AUTO: 8.5 FL (ref 8–12)
POTASSIUM BLD-SCNC: 4.9 MMOL/L (ref 3.5–5.1)
RBC # BLD AUTO: 3.62 10*6/MM3 (ref 4.37–5.74)
SODIUM BLD-SCNC: 135 MMOL/L (ref 137–145)
WBC NRBC COR # BLD: 10.99 10*3/MM3 (ref 3.2–9.8)

## 2018-11-20 PROCEDURE — 93641 EP EVL 1/2CHMB PAC CVDFB TST: CPT | Performed by: INTERNAL MEDICINE

## 2018-11-20 PROCEDURE — C1895 LEAD, AICD, ENDO DUAL COIL: HCPCS | Performed by: INTERNAL MEDICINE

## 2018-11-20 PROCEDURE — 25010000002 ONDANSETRON PER 1 MG: Performed by: FAMILY MEDICINE

## 2018-11-20 PROCEDURE — C1894 INTRO/SHEATH, NON-LASER: HCPCS | Performed by: INTERNAL MEDICINE

## 2018-11-20 PROCEDURE — 71045 X-RAY EXAM CHEST 1 VIEW: CPT

## 2018-11-20 PROCEDURE — 25010000002 PROPOFOL 10 MG/ML EMULSION: Performed by: NURSE ANESTHETIST, CERTIFIED REGISTERED

## 2018-11-20 PROCEDURE — 33249 INSJ/RPLCMT DEFIB W/LEAD(S): CPT | Performed by: INTERNAL MEDICINE

## 2018-11-20 PROCEDURE — 80048 BASIC METABOLIC PNL TOTAL CA: CPT | Performed by: INTERNAL MEDICINE

## 2018-11-20 PROCEDURE — 82962 GLUCOSE BLOOD TEST: CPT

## 2018-11-20 PROCEDURE — 25010000002 PROPOFOL 1000 MG/ML EMULSION: Performed by: NURSE ANESTHETIST, CERTIFIED REGISTERED

## 2018-11-20 PROCEDURE — C1722 AICD, SINGLE CHAMBER: HCPCS | Performed by: INTERNAL MEDICINE

## 2018-11-20 PROCEDURE — 63710000001 INSULIN ASPART PER 5 UNITS: Performed by: FAMILY MEDICINE

## 2018-11-20 PROCEDURE — 85027 COMPLETE CBC AUTOMATED: CPT | Performed by: INTERNAL MEDICINE

## 2018-11-20 PROCEDURE — 25010000002 GENTAMICIN PER 80 MG: Performed by: NURSE ANESTHETIST, CERTIFIED REGISTERED

## 2018-11-20 PROCEDURE — 25010000002 MIDAZOLAM PER 1 MG: Performed by: NURSE ANESTHETIST, CERTIFIED REGISTERED

## 2018-11-20 DEVICE — ICD FORTIFY ASSURA NEXT GEN VR 40 DF4: Type: IMPLANTABLE DEVICE | Status: FUNCTIONAL

## 2018-11-20 DEVICE — LD DEFIB DURATA SJ4 58CM 7120Q58: Type: IMPLANTABLE DEVICE | Status: FUNCTIONAL

## 2018-11-20 RX ORDER — ONDANSETRON 2 MG/ML
4 INJECTION INTRAMUSCULAR; INTRAVENOUS EVERY 6 HOURS PRN
Status: DISCONTINUED | OUTPATIENT
Start: 2018-11-20 | End: 2018-11-21 | Stop reason: HOSPADM

## 2018-11-20 RX ORDER — SODIUM CHLORIDE 0.9 % (FLUSH) 0.9 %
3 SYRINGE (ML) INJECTION EVERY 12 HOURS SCHEDULED
Status: DISCONTINUED | OUTPATIENT
Start: 2018-11-20 | End: 2018-11-20 | Stop reason: HOSPADM

## 2018-11-20 RX ORDER — KETAMINE HYDROCHLORIDE 100 MG/ML
INJECTION INTRAMUSCULAR; INTRAVENOUS AS NEEDED
Status: DISCONTINUED | OUTPATIENT
Start: 2018-11-20 | End: 2018-11-20 | Stop reason: SURG

## 2018-11-20 RX ORDER — SODIUM CHLORIDE 0.9 % (FLUSH) 0.9 %
3 SYRINGE (ML) INJECTION EVERY 12 HOURS SCHEDULED
Status: DISCONTINUED | OUTPATIENT
Start: 2018-11-20 | End: 2018-11-21 | Stop reason: HOSPADM

## 2018-11-20 RX ORDER — SODIUM CHLORIDE 0.9 % (FLUSH) 0.9 %
3-10 SYRINGE (ML) INJECTION AS NEEDED
Status: DISCONTINUED | OUTPATIENT
Start: 2018-11-20 | End: 2018-11-21 | Stop reason: HOSPADM

## 2018-11-20 RX ORDER — SODIUM CHLORIDE 0.9 % (FLUSH) 0.9 %
3-10 SYRINGE (ML) INJECTION AS NEEDED
Status: DISCONTINUED | OUTPATIENT
Start: 2018-11-20 | End: 2018-11-20 | Stop reason: HOSPADM

## 2018-11-20 RX ORDER — LISINOPRIL 10 MG/1
10 TABLET ORAL NIGHTLY
Status: DISCONTINUED | OUTPATIENT
Start: 2018-11-20 | End: 2018-11-21 | Stop reason: HOSPADM

## 2018-11-20 RX ORDER — GLYCOPYRROLATE 0.2 MG/ML
INJECTION INTRAMUSCULAR; INTRAVENOUS AS NEEDED
Status: DISCONTINUED | OUTPATIENT
Start: 2018-11-20 | End: 2018-11-20 | Stop reason: SURG

## 2018-11-20 RX ORDER — MIDAZOLAM HYDROCHLORIDE 1 MG/ML
INJECTION INTRAMUSCULAR; INTRAVENOUS AS NEEDED
Status: DISCONTINUED | OUTPATIENT
Start: 2018-11-20 | End: 2018-11-20 | Stop reason: SURG

## 2018-11-20 RX ORDER — BUPIVACAINE HCL/0.9 % NACL/PF 0.1 %
2 PLASTIC BAG, INJECTION (ML) EPIDURAL ONCE
Status: COMPLETED | OUTPATIENT
Start: 2018-11-20 | End: 2018-11-20

## 2018-11-20 RX ORDER — ALBUMIN (HUMAN) 12.5 G/50ML
12.5 SOLUTION INTRAVENOUS AS NEEDED
Status: DISCONTINUED | OUTPATIENT
Start: 2018-11-21 | End: 2018-11-21 | Stop reason: HOSPADM

## 2018-11-20 RX ORDER — HEPARIN SODIUM 1000 [USP'U]/ML
2000 INJECTION, SOLUTION INTRAVENOUS; SUBCUTANEOUS AS NEEDED
Status: DISCONTINUED | OUTPATIENT
Start: 2018-11-21 | End: 2018-11-21 | Stop reason: HOSPADM

## 2018-11-20 RX ORDER — GENTAMICIN SULFATE 40 MG/ML
INJECTION, SOLUTION INTRAMUSCULAR; INTRAVENOUS AS NEEDED
Status: DISCONTINUED | OUTPATIENT
Start: 2018-11-20 | End: 2018-11-20 | Stop reason: SURG

## 2018-11-20 RX ORDER — SEVELAMER HYDROCHLORIDE 800 MG/1
1600 TABLET, FILM COATED ORAL
Status: DISCONTINUED | OUTPATIENT
Start: 2018-11-20 | End: 2018-11-21 | Stop reason: HOSPADM

## 2018-11-20 RX ORDER — PROPOFOL 10 MG/ML
VIAL (ML) INTRAVENOUS AS NEEDED
Status: DISCONTINUED | OUTPATIENT
Start: 2018-11-20 | End: 2018-11-20 | Stop reason: SURG

## 2018-11-20 RX ORDER — LIDOCAINE HYDROCHLORIDE 20 MG/ML
INJECTION, SOLUTION INFILTRATION; PERINEURAL AS NEEDED
Status: DISCONTINUED | OUTPATIENT
Start: 2018-11-20 | End: 2018-11-20 | Stop reason: HOSPADM

## 2018-11-20 RX ORDER — GLYCOPYRROLATE 0.2 MG/ML
INJECTION INTRAMUSCULAR; INTRAVENOUS AS NEEDED
Status: DISCONTINUED | OUTPATIENT
Start: 2018-11-20 | End: 2018-11-20

## 2018-11-20 RX ORDER — SODIUM CHLORIDE 9 MG/ML
50 INJECTION, SOLUTION INTRAVENOUS CONTINUOUS
Status: DISCONTINUED | OUTPATIENT
Start: 2018-11-20 | End: 2018-11-21 | Stop reason: HOSPADM

## 2018-11-20 RX ADMIN — KETAMINE HYDROCHLORIDE 10 MG: 100 INJECTION INTRAMUSCULAR; INTRAVENOUS at 12:07

## 2018-11-20 RX ADMIN — SODIUM CHLORIDE: 900 INJECTION, SOLUTION INTRAVENOUS at 11:46

## 2018-11-20 RX ADMIN — INSULIN ASPART 3 UNITS: 100 INJECTION, SOLUTION INTRAVENOUS; SUBCUTANEOUS at 20:12

## 2018-11-20 RX ADMIN — ONDANSETRON 4 MG: 2 INJECTION INTRAMUSCULAR; INTRAVENOUS at 12:45

## 2018-11-20 RX ADMIN — LABETALOL HYDROCHLORIDE 100 MG: 100 TABLET, FILM COATED ORAL at 20:12

## 2018-11-20 RX ADMIN — MELATONIN 6 MG: 3 TAB ORAL at 20:12

## 2018-11-20 RX ADMIN — GLYCOPYRROLATE 100 MCG: 0.2 INJECTION, SOLUTION INTRAMUSCULAR; INTRAVENOUS at 12:23

## 2018-11-20 RX ADMIN — MIDAZOLAM HYDROCHLORIDE 1 MG: 2 INJECTION, SOLUTION INTRAMUSCULAR; INTRAVENOUS at 11:53

## 2018-11-20 RX ADMIN — ISOSORBIDE MONONITRATE 30 MG: 30 TABLET, EXTENDED RELEASE ORAL at 08:12

## 2018-11-20 RX ADMIN — FAMOTIDINE 40 MG: 40 TABLET ORAL at 08:12

## 2018-11-20 RX ADMIN — PROPOFOL 80 MCG/KG/MIN: 10 INJECTION, EMULSION INTRAVENOUS at 12:02

## 2018-11-20 RX ADMIN — GENTAMICIN SULFATE 80 MG: 40 INJECTION, SOLUTION INTRAMUSCULAR; INTRAVENOUS at 12:17

## 2018-11-20 RX ADMIN — LISINOPRIL 10 MG: 10 TABLET ORAL at 20:12

## 2018-11-20 RX ADMIN — Medication 2 G: at 11:48

## 2018-11-20 RX ADMIN — SODIUM CHLORIDE, PRESERVATIVE FREE 3 ML: 5 INJECTION INTRAVENOUS at 08:12

## 2018-11-20 RX ADMIN — PROPOFOL 10 MG: 10 INJECTION, EMULSION INTRAVENOUS at 13:07

## 2018-11-20 RX ADMIN — PROPOFOL 20 MG: 10 INJECTION, EMULSION INTRAVENOUS at 13:03

## 2018-11-20 RX ADMIN — LABETALOL HYDROCHLORIDE 100 MG: 100 TABLET, FILM COATED ORAL at 08:12

## 2018-11-20 RX ADMIN — RENAGEL 1600 MG: 800 TABLET ORAL at 17:53

## 2018-11-20 RX ADMIN — KETAMINE HYDROCHLORIDE 10 MG: 100 INJECTION INTRAMUSCULAR; INTRAVENOUS at 12:20

## 2018-11-20 RX ADMIN — MIDAZOLAM HYDROCHLORIDE 1 MG: 2 INJECTION, SOLUTION INTRAMUSCULAR; INTRAVENOUS at 11:47

## 2018-11-20 NOTE — PROGRESS NOTES
Progress Note  Kee Crockett MD  Hospitalist    Date of visit: 11/20/2018     LOS: 5 days   Patient Care Team:  John Hyde MD as PCP - General (Family Medicine)    Chief Complaint: dyspnea    Subjective     Interval History:     Patient Complaints: dyspnea / some better after dialysis    History taken from: patient    Medication Review:   Current Facility-Administered Medications   Medication Dose Route Frequency Provider Last Rate Last Dose   • acetaminophen (TYLENOL) tablet 650 mg  650 mg Oral Q6H PRN Jethro Borges DO   650 mg at 11/18/18 1509   • acetaminophen-codeine (TYLENOL #3) 300-30 MG per tablet 2 tablet  2 tablet Oral Q6H PRN Kee Crockett MD   2 tablet at 11/19/18 2322   • ceFAZolin (ANCEF) 1 g/100 mL 0.9% NS IVPB (mbp)  1 g Intravenous Once Garcia Haque MD       • ceFAZolin in 0.9% normal saline (ANCEF) IVPB solution 2 g  2 g Intravenous Once Garcia Haque MD       • clopidogrel (PLAVIX) tablet 75 mg  75 mg Oral Daily Robin Yi MD   Stopped at 11/20/18 0807   • cyclobenzaprine (FLEXERIL) tablet 5 mg  5 mg Oral TID PRN Kee Crockett MD   5 mg at 11/19/18 1227   • dextrose (D50W) 25 g/ 50mL Intravenous Solution 25 g  25 g Intravenous Q15 Min PRN Jethro Borges DO       • dextrose (GLUTOSE) oral gel 15 g  15 g Oral Q15 Min PRN Jethro Borges DO       • doxercalciferol (HECTOROL) injection 2 mcg  2 mcg Intravenous Once per day on Mon Wed Fri Mark Crawley MD   2 mcg at 11/19/18 0930   • famotidine (PEPCID) tablet 40 mg  40 mg Oral Daily Jethro Borges DO   40 mg at 11/20/18 0812   • ferric gluconate (FERRLECIT) 125 mg in sodium chloride 0.9 % 110 mL IVPB  125 mg Intravenous Once per day on Mon Wed Fri Mark Crawley  mL/hr at 11/19/18 0930 125 mg at 11/19/18 0930   • gentamicin (GARAMYCIN) 80 mg in sodium chloride 0.9 % 100 mL IVPB  80 mg Intravenous Once Garcia Haque MD       • glucagon (human recombinant) (GLUCAGEN DIAGNOSTIC) injection  1 mg  1 mg Subcutaneous PRN Jethro Borges DO       • heparin (porcine) injection 2,000 Units  2,000 Units Intracatheter PRN Mark Crawley MD       • HYDROcodone-acetaminophen (NORCO) 7.5-325 MG per tablet 1 tablet  1 tablet Oral Q6H PRN Tristan Noonan MD   1 tablet at 11/18/18 1827   • insulin aspart (novoLOG) injection 0-14 Units  0-14 Units Subcutaneous 4x Daily AC & at Bedtime Jethro Borges DO   3 Units at 11/19/18 2303   • isosorbide mononitrate (IMDUR) 24 hr tablet 30 mg  30 mg Oral Q24H Robin Yi MD   30 mg at 11/20/18 0812   • labetalol (NORMODYNE) tablet 100 mg  100 mg Oral Q12H Mark Crawley MD   100 mg at 11/20/18 0812   • lisinopril (PRINIVIL,ZESTRIL) tablet 10 mg  10 mg Oral Nightly Mark Crawley MD       • melatonin tablet 6 mg  6 mg Oral Nightly Jethro Borges DO   6 mg at 11/19/18 2303   • muscle rub (Chris-Monae) 10-15 % cream   Topical Q1H PRN Tristan Noonan MD       • ondansetron (ZOFRAN) injection 4 mg  4 mg Intravenous Q6H PRN Tristan Noonan MD   4 mg at 11/18/18 2322   • sevelamer (RENAGEL) tablet 1,600 mg  1,600 mg Oral TID With Meals Mark Crawley MD       • sodium chloride 0.9 % flush 1-10 mL  1-10 mL Intravenous PRN Robin Yi MD       • sodium chloride 0.9 % flush 3 mL  3 mL Intravenous Q12H Jethro Borges DO   3 mL at 11/20/18 0812   • sodium chloride 0.9 % flush 3 mL  3 mL Intravenous Q12H Robin Yi MD   3 mL at 11/19/18 2304   • sodium chloride 0.9 % flush 3 mL  3 mL Intravenous Q12H Garcia Haque MD       • sodium chloride 0.9 % flush 3-10 mL  3-10 mL Intravenous PRN Jethro Borges DO       • sodium chloride 0.9 % flush 3-10 mL  3-10 mL Intravenous PRN Garcia Haque MD       • sodium chloride 0.9 % infusion  50 mL/hr Intravenous Continuous Garcia Haque MD       • Vitamin D3 50,000 Units  50,000 Units Oral Q7 Days Mark Crawley MD   50,000 Units at 11/16/18 2097       Review of Systems:   Review of  Systems   Constitutional: Positive for fatigue. Negative for fever.   Respiratory: Positive for shortness of breath. Negative for cough, wheezing and stridor.    Gastrointestinal: Positive for nausea. Negative for abdominal distention, abdominal pain, anal bleeding, diarrhea, rectal pain and vomiting.   Genitourinary: Negative for discharge, enuresis, frequency, genital sores, penile swelling and urgency.   Musculoskeletal: Positive for back pain. Negative for arthralgias and gait problem.   Skin: Positive for pallor. Negative for color change and rash.   Neurological: Positive for weakness. Negative for tremors, seizures, facial asymmetry and headaches.   Psychiatric/Behavioral: Positive for behavioral problems. Negative for agitation and confusion.   All other systems reviewed and are negative.      Objective     Vital Signs  Temp:  [97.8 °F (36.6 °C)-98.9 °F (37.2 °C)] 97.8 °F (36.6 °C)  Heart Rate:  [80-84] 82  Resp:  [16-18] 16  BP: (116-135)/(70-80) 135/80    Physical Exam:  Physical Exam   Constitutional: He is oriented to person, place, and time. He appears ill. No distress.   HENT:   Head: Normocephalic and atraumatic.   Eyes: EOM are normal. Pupils are equal, round, and reactive to light.   Neck: Normal range of motion. Neck supple.   Cardiovascular: Normal rate and regular rhythm.   Pulmonary/Chest: Effort normal and breath sounds normal. No stridor. No respiratory distress.   Abdominal: Soft. Bowel sounds are normal. He exhibits no distension. There is no tenderness.   Musculoskeletal: Normal range of motion. He exhibits no edema or tenderness.   Neurological: He is alert and oriented to person, place, and time. No cranial nerve deficit. Coordination normal.   Skin: Skin is warm and dry. No rash noted. There is pallor.   Psychiatric: He has a normal mood and affect. His behavior is normal.   Vitals reviewed.       Results Review:    Lab Results (last 24 hours)     Procedure Component Value Units  Date/Time    POC Glucose Once [[id number]]  (Normal) Collected:  11/20/18 1014    Specimen:  Blood Updated:  11/20/18 1037     Glucose 93 mg/dL      Comment: RN NotifiedOperator: [id number] TOMA WHITEYMeter ID: [id number]       Basic Metabolic Panel [[id number]]  (Abnormal) Collected:  11/20/18 0955    Specimen:  Blood Updated:  11/20/18 1033     Glucose 94 mg/dL      BUN 59 mg/dL      Creatinine 8.01 mg/dL      Sodium 135 mmol/L      Potassium 4.9 mmol/L      Chloride 98 mmol/L      CO2 27.0 mmol/L      Calcium 7.9 mg/dL      eGFR  African Amer 9 mL/min/1.73      Comment: <15 Indicative of kidney failure.        eGFR Non African Amer -- mL/min/1.73      Comment: <15 Indicative of kidney failure.        BUN/Creatinine Ratio 7.4     Anion Gap 10.0 mmol/L     CBC (No Diff) [[id number]]  (Abnormal) Collected:  11/20/18 0955    Specimen:  Blood Updated:  11/20/18 1005     WBC 10.99 10*3/mm3      RBC 3.62 10*6/mm3      Hemoglobin 10.4 g/dL      Hematocrit 32.4 %      MCV 89.5 fL      MCH 28.7 pg      MCHC 32.1 g/dL      RDW 13.9 %      RDW-SD 45.6 fl      MPV 8.5 fL      Platelets 259 10*3/mm3     POC Glucose Once [[id number]]  (Normal) Collected:  11/20/18 0609    Specimen:  Blood Updated:  11/20/18 0631     Glucose 106 mg/dL      Comment: RN NotifiedOperator: [id number] VIKA FUENTESINMeter ID: [id number]       POC Glucose Once [[id number]]  (Abnormal) Collected:  11/19/18 1946    Specimen:  Blood Updated:  11/19/18 2139     Glucose 180 mg/dL      Comment: RN NotifiedOperator: [id number] VIKA FUENTESINMeter ID: [id number]       POC Glucose Once [[id number]]  (Abnormal) Collected:  11/19/18 1528    Specimen:  Blood Updated:  11/19/18 1646     Glucose 197 mg/dL      Comment: RN NotifiedOperator: [id number] ELOISE AUGUSTeter ID: [id number]       POC Glucose Once [[id number]]  (Normal) Collected:  11/19/18 1123    Specimen:  Blood Updated:  11/19/18 1229     Glucose 127 mg/dL      Comment: RN NotifiedOperator: [id number]  ELOISE Sanchez ID: [id number]             Imaging Results (last 24 hours)     Procedure Component Value Units Date/Time    XR Chest Post CVA Port [[id number]] Collected:  11/19/18 1655     Updated:  11/19/18 1719    Narrative:         EXAM:         Radiograph(s), Chest   VIEWS:   Frontal  ; 1       DATE/TIME:  11/19/2018 5:16 PM CST                INDICATION:   tunnel cath, I50.9 Heart failure, unspecified  I13.11 Hypertensive heart and chronic kidney disease without  heart failure, with stage 5 chronic kidney disease, or end stage  renal disease N18.5 Chronic kidney disease, stage 5 N18.4 Chronic  kidney disease, stage 4 (severe) I25.5 Ischemic cardiomyopathy    COMPARISON:  CXR: 11/15/18             FINDINGS:             - lines/tubes:    There is a right approach tunneled catheter in  standard position.     - cardiac:         Moderate cardiac silhouette enlargement,  unchanged.         - mediastinum: contour within normal limits         - lungs:         no focal air space process, pulmonary  interstitial edema, nodule(s)/mass             - pleura:         No evidence of a pneumothorax.                  - osseous:         unremarkable for age                  - misc.:         Impression:       CONCLUSION:        1. Stable post procedural examination.                                                             Electronically signed by:  ALISHA Haro MD  11/19/2018 5:17  PM CST Workstation: [id number]    US Guided Vascular Access [[id number]] Resulted:  11/19/18 1700     Updated:  11/19/18 1700    Narrative:       This procedure was auto-finalized with no dictation required.    IR insert non-tunneled CVC 5+ [[id number]] Resulted:  11/19/18 1648     Updated:  11/19/18 1649    Narrative:       OPERATIVE NOTE  Jamil Olivo  1965 11/14/2018 - 11/16/2018  PREOP DIAGNOSES:  Acute kidney injury requiring short term hemodialysis.    POSTOP DIAGNOSES:   Acute kidney injury requiring short term  hemodialysis.    PROCEDURE:   Placement non-tunneled central venous catheter (13Fr Trialysis)  Vascular ultrasound guidance    SURGEON:  Bravo Conway MD    ASSISTANT:  Sania Nichols RT     ANESTHESIA: Local 1% lidocaine    COMPLICATIONS: None    DESCRIPTION OF OPERATION: In Angio suite, patient placed in supine   position, prepped and draped in sterile fashion.  Vascular ultrasound was   used to identify the right common femoral vein which was 10mm in diameter   and patent.  Cannulated via modified Seldinger technique with mini stick   under ultrasound guidance.  Exchanged for a 0.035 guidewire and serial   dilators.  A 13Fr Trialysis catheter was placed, aspirated and flushed   without difficulty with Hep-Lock solution.  1000U/mL heparin instilled   into lumens. Catheter secured to the skin with 2-0 Nylon suture.  Sterile   dressing applied. Patient tolerated procedure well. Dialysis notified.                   Assessment/Plan       Acute on chronic systolic heart failure (CMS/HCC)    Diabetes mellitus (CMS/HCC)    ESRF (end stage renal failure) (CMS/HCC)    CAD (coronary artery disease)    Hypertension    Ischemic cardiomyopathy    Continue with dialysis / scheduled for AICD placement later today.    Kee Crockett MD  11/20/18  11:07 AM

## 2018-11-20 NOTE — PROGRESS NOTES
"[facility]  [address] [place] [address]. [address]   - [phone]  F - [phone]     Progress Note          PATIENT  DEMOGRAPHICS   PATIENT NAME: Jamil Olivo                      PHYSICIAN: Mark Crawley MD  : 1965  MRN: [id number]   LOS: 5 days    Patient Care Team:  John Hyde MD as PCP - General (Family Medicine)  Subjective   SUBJECTIVE   Was hypotensive yesterday with hd now doing well . Plan for aicd today         Objective   OBJECTIVE   Vital Signs  Temp:  [97.8 °F (36.6 °C)-98.9 °F (37.2 °C)] 97.8 °F (36.6 °C)  Heart Rate:  [80-84] 82  Resp:  [16-18] 16  BP: (116-135)/(70-80) 135/80    Flowsheet Rows      First Filed Value   Admission Height  180.3 cm (71\") Documented at 2018 2335   Admission Weight  106 kg (232 lb 9.6 oz) Documented at 2018 2335           I/O last 3 completed shifts:  In: 690 [P.O.:690]  Out: -     PHYSICAL EXAM    Physical Exam   Constitutional: He is oriented to person, place, and time. He appears well-developed.   HENT:   Head: Normocephalic.   Eyes: Pupils are equal, round, and reactive to light.   Neck: No JVD present.   Cardiovascular: Normal rate, regular rhythm and normal heart sounds.   Pulmonary/Chest: Effort normal and breath sounds normal.   Abdominal: Soft. Bowel sounds are normal.   Musculoskeletal: He exhibits edema.   Neurological: He is alert and oriented to person, place, and time.       RESULTS   Results Review:    Results from last 7 days   Lab Units  18   0955  18   0844  18   0526   SODIUM mmol/L  135*  133*  133*   POTASSIUM mmol/L  4.9  5.4*  4.3   CHLORIDE mmol/L  98  94*  98   CO2 mmol/L  27.0  26.0  27.0   BUN mg/dL  59*  71*  53*   CREATININE mg/dL  8.01*  8.21*  5.89*   CALCIUM mg/dL  7.9*  8.2*  8.2*   GLUCOSE mg/dL  94  206*  104*       Estimated Creatinine Clearance: 12.8 mL/min (A) (by C-G formula based on SCr of 8.01 mg/dL (H)).    Results from last 7 days   Lab Units  " 11/18/18   0526  11/17/18   0638  11/16/18   0541  11/15/18   0517   MAGNESIUM mg/dL   --    --    --   2.5*   PHOSPHORUS mg/dL  6.4*  5.8*  6.1*  5.2*             Results from last 7 days   Lab Units  11/20/18   0955  11/17/18   0638  11/16/18   1343  11/16/18   0541  11/15/18   0141   WBC 10*3/mm3  10.99*  8.22  8.21  7.01  10.20*   HEMOGLOBIN g/dL  10.4*  10.4*  10.7*  10.3*  11.5*   PLATELETS 10*3/mm3  259  297  302  298  315       Results from last 7 days   Lab Units  11/16/18   1343   INR   1.14         Imaging Results (last 24 hours)     Procedure Component Value Units Date/Time    XR Chest Post CVA Port [[id number]] Collected:  11/19/18 1655     Updated:  11/19/18 1719    Narrative:         EXAM:         Radiograph(s), Chest   VIEWS:   Frontal  ; 1       DATE/TIME:  11/19/2018 5:16 PM CST                INDICATION:   tunnel cath, I50.9 Heart failure, unspecified  I13.11 Hypertensive heart and chronic kidney disease without  heart failure, with stage 5 chronic kidney disease, or end stage  renal disease N18.5 Chronic kidney disease, stage 5 N18.4 Chronic  kidney disease, stage 4 (severe) I25.5 Ischemic cardiomyopathy    COMPARISON:  CXR: 11/15/18             FINDINGS:             - lines/tubes:    There is a right approach tunneled catheter in  standard position.     - cardiac:         Moderate cardiac silhouette enlargement,  unchanged.         - mediastinum: contour within normal limits         - lungs:         no focal air space process, pulmonary  interstitial edema, nodule(s)/mass             - pleura:         No evidence of a pneumothorax.                  - osseous:         unremarkable for age                  - misc.:         Impression:       CONCLUSION:        1. Stable post procedural examination.                                                             Electronically signed by:  ALISHA Haro MD  11/19/2018 5:17  PM CST Workstation: [id number]    US Guided Vascular Access [[id number]]  Resulted:  11/19/18 1700     Updated:  11/19/18 1700    Narrative:       This procedure was auto-finalized with no dictation required.    IR insert non-tunneled CVC 5+ [[id number]] Resulted:  11/19/18 1648     Updated:  11/19/18 1649    Narrative:       OPERATIVE NOTE  Jamil Olivo  1965 11/14/2018 - 11/16/2018  PREOP DIAGNOSES:  Acute kidney injury requiring short term hemodialysis.    POSTOP DIAGNOSES:   Acute kidney injury requiring short term hemodialysis.    PROCEDURE:   Placement non-tunneled central venous catheter (13Fr Trialysis)  Vascular ultrasound guidance    SURGEON:  Bravo Conway MD    ASSISTANT:  Sania SHARPE     ANESTHESIA: Local 1% lidocaine    COMPLICATIONS: None    DESCRIPTION OF OPERATION: In Angio suite, patient placed in supine   position, prepped and draped in sterile fashion.  Vascular ultrasound was   used to identify the right common femoral vein which was 10mm in diameter   and patent.  Cannulated via modified Seldinger technique with mini stick   under ultrasound guidance.  Exchanged for a 0.035 guidewire and serial   dilators.  A 13Fr Trialysis catheter was placed, aspirated and flushed   without difficulty with Hep-Lock solution.  1000U/mL heparin instilled   into lumens. Catheter secured to the skin with 2-0 Nylon suture.  Sterile   dressing applied. Patient tolerated procedure well. Dialysis notified.                    MEDICATIONS      ceFAZolin 2 g Intravenous Once   clopidogrel 75 mg Oral Daily   doxercalciferol 2 mcg Intravenous Once per day on Mon Wed Fri   famotidine 40 mg Oral Daily   ferric gluconate (FERRLECIT) IVPB 125 mg Intravenous Once per day on Mon Wed Fri   gentamicin 80 mg Intravenous Once   insulin aspart 0-14 Units Subcutaneous 4x Daily AC & at Bedtime   isosorbide mononitrate 30 mg Oral Q24H   labetalol 100 mg Oral Q12H   lisinopril 20 mg Oral Nightly   melatonin 6 mg Oral Nightly   sevelamer 800 mg Oral TID With Meals   sodium chloride 3 mL  Intravenous Q12H   sodium chloride 3 mL Intravenous Q12H   sodium chloride 3 mL Intravenous Q12H   Vitamin D3 50,000 Units Oral Q7 Days       sodium chloride 50 mL/hr       Assessment/Plan   ASSESSMENT / PLAN      Acute on chronic systolic heart failure (CMS/HCC)    Diabetes mellitus (CMS/HCC)    Hypertension    Ischemic cardiomyopathy    ESRF (end stage renal failure) (CMS/HCC)    CAD (coronary artery disease)    1.CKD 5 with the worsening creatinine and now fluid overload with the pulmonary edema and anasarca / ESRD.  Patient creatinine is up to 6.3 peak. We started dialysis on 11/16/18. Hd m/w/f here and out patient TTS (bc of holiday this week is Friday and Saturday) appreciate dr Hoang help with tunnel cath. Groin cath is out. PD catheter and training later. edw is around 222-223 lbs and will avoid large uf tomorrow     13 gm of protein. 24 hr cr cl is 12 ml/min.     2.hypertension poorly controlled earlier now much better and low lower lisinopril to 10mg hs. Avoid labetalol in am of dialysis. off hydralazine and amlodipine and tenex.      3.CKD/minimal and bone disorder.  We'll do a renal diet for now.  keep renagel and increase it to 2 tab, keep vitamin d and hectorol    4. Anemia of ckd low t sat - on iv fe with hd.     5. CAD ICM EF 20%. 100% occlusion RCA and CX. Plan for AICD    6. dispo dc tomorrow post dialysis                This document has been electronically signed by Mark Crawley MD on November 20, 2018 10:34 AM

## 2018-11-20 NOTE — ANESTHESIA PREPROCEDURE EVALUATION
Anesthesia Evaluation     Patient summary reviewed and Nursing notes reviewed   no history of anesthetic complications:  NPO Solid Status: > 8 hours  NPO Liquid Status: > 8 hours           Airway   Mallampati: III  TM distance: >3 FB  Neck ROM: full  possible difficult intubation  Dental    (+) poor dentation    Comment: Upper and lower dentition in poor repair with grounded surfaces.    Pulmonary - normal exam    breath sounds clear to auscultation  (+) asthma, decreased breath sounds,   (-) not a smoker    ROS comment:      FINDINGS:             - lines/tubes:    There is a right approach tunneled catheter in  standard position.     - cardiac:         Moderate cardiac silhouette enlargement,  unchanged.         - mediastinum: contour within normal limits         - lungs:         no focal air space process, pulmonary  interstitial edema, nodule(s)/mass             - pleura:         No evidence of a pneumothorax.                  - osseous:         unremarkable for age                  - misc.:       IMPRESSION:  CONCLUSION:        1. Stable post procedural examination.                                                              Electronically signed by:  ALISHA Haro MD  11/19/2018 5:17  PM CST Workstation: [id number]  Cardiovascular - normal exam    ECG reviewed  PT is on anticoagulation therapy  Patient on routine beta blocker and Beta blocker given within 24 hours of surgery  Rhythm: irregular  Rate: normal    (+) hypertension poorly controlled 2 medications or greater, CAD, CHF,   (-) murmur    ROS comment:  Impression   1.  100% occluded circumflex artery filling in from left to left collateral.  2.  100% occluded proximal right coronary artery filling in from left-to-right collateral.  3.  No evidence of any obstructive disease noted in the left anterior descending artery.  4.  Left ventricular enlargement with global hypokinesis with an ejection fraction of 20%     Recommendations: Patient currently is  not having any symptoms of substernal chest pain.  Patient has ischemic cardiomyopathy and left ventricular systolic dysfunction.  Patient at the present time has been recommended evaluation for an AICD.  Should the patient have symptoms of chest pain patient would be evaluated for percutaneous intervention for the chronic occlusion on the right and the circumflex artery.  Have discussed with Dr. Haque would consider AICD implantation on Monday.        Robin Yi MD  11/16/2018Left Ventricle Left ventricular function is severely decreased. Calculated EF = 14.7%. Estimated EF was in agreement with the calculated EF. Estimated EF appears to be in the range of 21 - 25%. Normal left ventricular mass noted. Global left ventricular wall motion appears abnormal. The left ventricular cavity is moderately dilated. Left ventricular wall thickness is consistent with a thin walled ventricle. Septal wall motion is abnormal. Left ventricular diastolic function is normal. There is no evidence of a left ventricular mass or thrombus present.  Right Ventricle Normal cavity size noted.  Left Atrium Left atrial cavity size is borderline dilated. No evidence of a left atrial thrombus present. No evidence of a left atrial mass present. There is no spontaneous echo contrast present.  Right Atrium Right atrial cavity size is borderline dilated. No evidence of a right atrial thrombus present. No evidence of a right atrial mass present.  Aortic Valve The aortic valve is abnormal in structure.  Mitral Valve The mitral valve is abnormal in structure. There is mild anterior leaflet thickening present. There is posterior leaflet thickening present.   Mild mitral valve regurgitation is present.  Tricuspid Valve The tricuspid valve is normal. No tricuspid valve stenosis is present. Mild to moderate tricuspid valve regurgitation is present. Estimated right ventricular systolic pressure from tricuspid regurgitation is moderately elevated (45-55  mmHg).  Pulmonic Valve The pulmonic valve was not assessed.  Greater Vessels No dilation of the aortic root is present.                                                                              Neuro/Psych- negative ROS  GI/Hepatic/Renal/Endo    (+) obesity,  GERD well controlled,  renal disease (Creatinine 8.21) ESRD and CRI, diabetes mellitus type 2 using insulin,     ROS Comment: HGB 10.4 HCT 32.4 11/20/18    Musculoskeletal (-) negative ROS    Abdominal   (+) obese,    Substance History - negative use     OB/GYN negative ob/gyn ROS         Other - negative ROS                       Anesthesia Plan    ASA 4     MAC and general     intravenous induction   Anesthetic plan, all risks, benefits, and alternatives have been provided, discussed and informed consent has been obtained with: patient and spouse/significant other.

## 2018-11-20 NOTE — ANESTHESIA POSTPROCEDURE EVALUATION
Patient: Jamil Olivo    Procedure Summary     Date:  11/20/18 Room / Location:  [facility] CATH LAB  / [facility] CATH INVASIVE LOCATION    Anesthesia Start:  1147 Anesthesia Stop:  1321    Procedure:  ICD SC new (N/A ) Diagnosis:       Ischemic cardiomyopathy      (ischemic caediomyopathy)    Provider:  Garcia Haque MD Provider:  Aleksandr Helms MD    Anesthesia Type:  MAC, general ASA Status:  4          Anesthesia Type: MAC, general  Last vitals  BP   135/80 (11/20/18 0717)   Temp   97.8 °F (36.6 °C) (11/20/18 0717)   Pulse   82 (11/20/18 0817)   Resp   16 (11/20/18 0717)     SpO2   91 % (11/20/18 0717)     Post Anesthesia Care and Evaluation    Patient location during evaluation: bedside  Patient participation: complete - patient participated  Level of consciousness: awake and awake and alert  Pain score: 0  Pain management: satisfactory to patient  Airway patency: patent  Anesthetic complications: No anesthetic complications  PONV Status: none  Cardiovascular status: acceptable and stable  Respiratory status: acceptable, room air and spontaneous ventilation  Hydration status: acceptable

## 2018-11-20 NOTE — PLAN OF CARE
Problem: Patient Care Overview  Goal: Plan of Care Review  Outcome: Ongoing (interventions implemented as appropriate)   11/20/18 1533   Coping/Psychosocial   Plan of Care Reviewed With patient   Plan of Care Review   Progress no change   OTHER   Outcome Summary VSS; AICD placed 11/20/2018; dialysis planned for 11/21/2018; will continue to monitor

## 2018-11-20 NOTE — PLAN OF CARE
Problem: Patient Care Overview  Goal: Plan of Care Review  Outcome: Ongoing (interventions implemented as appropriate)    Goal: Individualization and Mutuality  Outcome: Ongoing (interventions implemented as appropriate)    Goal: Discharge Needs Assessment  Outcome: Ongoing (interventions implemented as appropriate)    Goal: Interprofessional Rounds/Family Conf  Outcome: Ongoing (interventions implemented as appropriate)      Problem: Cardiac: Heart Failure (Adult)  Goal: Signs and Symptoms of Listed Potential Problems Will be Absent, Minimized or Managed (Cardiac: Heart Failure)  Outcome: Ongoing (interventions implemented as appropriate)      Problem: Hypertensive Disease/Crisis (Arterial) (Adult)  Goal: Signs and Symptoms of Listed Potential Problems Will be Absent, Minimized or Managed (Hypertensive Disease/Crisis)  Outcome: Ongoing (interventions implemented as appropriate)      Problem: Diabetes, Type 2 (Adult)  Goal: Signs and Symptoms of Listed Potential Problems Will be Absent, Minimized or Managed (Diabetes, Type 2)  Outcome: Ongoing (interventions implemented as appropriate)      Problem: Pain, Acute (Adult)  Goal: Identify Related Risk Factors and Signs and Symptoms  Outcome: Ongoing (interventions implemented as appropriate)    Goal: Acceptable Pain Control/Comfort Level  Outcome: Ongoing (interventions implemented as appropriate)

## 2018-11-21 VITALS
BODY MASS INDEX: 31.78 KG/M2 | HEART RATE: 90 BPM | HEIGHT: 71 IN | OXYGEN SATURATION: 93 % | TEMPERATURE: 97.5 F | WEIGHT: 227 LBS | SYSTOLIC BLOOD PRESSURE: 132 MMHG | RESPIRATION RATE: 18 BRPM | DIASTOLIC BLOOD PRESSURE: 84 MMHG

## 2018-11-21 LAB
ALBUMIN SERPL-MCNC: 3 G/DL (ref 3.4–4.8)
ANION GAP SERPL CALCULATED.3IONS-SCNC: 10 MMOL/L (ref 5–15)
BUN BLD-MCNC: 72 MG/DL (ref 7–21)
BUN/CREAT SERPL: 8.3 (ref 7–25)
CALCIUM SPEC-SCNC: 8.2 MG/DL (ref 8.4–10.2)
CHLORIDE SERPL-SCNC: 98 MMOL/L (ref 95–110)
CO2 SERPL-SCNC: 25 MMOL/L (ref 22–31)
CREAT BLD-MCNC: 8.65 MG/DL (ref 0.7–1.3)
GFR SERPL CREATININE-BSD FRML MDRD: 8 ML/MIN/1.73 (ref 56–130)
GFR SERPL CREATININE-BSD FRML MDRD: ABNORMAL ML/MIN/1.73 (ref 56–130)
GLUCOSE BLD-MCNC: 106 MG/DL (ref 60–100)
GLUCOSE BLDC GLUCOMTR-MCNC: 133 MG/DL (ref 70–130)
GLUCOSE BLDC GLUCOMTR-MCNC: 139 MG/DL (ref 70–130)
GLUCOSE BLDC GLUCOMTR-MCNC: 170 MG/DL (ref 70–130)
PHOSPHATE SERPL-MCNC: 8.1 MG/DL (ref 2.4–4.4)
POTASSIUM BLD-SCNC: 5 MMOL/L (ref 3.5–5.1)
SODIUM BLD-SCNC: 133 MMOL/L (ref 137–145)
WHOLE BLOOD HOLD SPECIMEN: NORMAL

## 2018-11-21 PROCEDURE — 80069 RENAL FUNCTION PANEL: CPT | Performed by: INTERNAL MEDICINE

## 2018-11-21 PROCEDURE — 5A1D70Z PERFORMANCE OF URINARY FILTRATION, INTERMITTENT, LESS THAN 6 HOURS PER DAY: ICD-10-PCS | Performed by: FAMILY MEDICINE

## 2018-11-21 PROCEDURE — 99024 POSTOP FOLLOW-UP VISIT: CPT | Performed by: INTERNAL MEDICINE

## 2018-11-21 PROCEDURE — 25010000002 DOXERCALCIFEROL PER 1 MCG: Performed by: INTERNAL MEDICINE

## 2018-11-21 PROCEDURE — 25010000002 NA FERRIC GLUC CPLX PER 12.5 MG: Performed by: INTERNAL MEDICINE

## 2018-11-21 PROCEDURE — 82962 GLUCOSE BLOOD TEST: CPT

## 2018-11-21 RX ORDER — LABETALOL 100 MG/1
100 TABLET, FILM COATED ORAL 2 TIMES DAILY
Qty: 60 TABLET | Refills: 1 | Status: SHIPPED | OUTPATIENT
Start: 2018-11-21 | End: 2019-07-25 | Stop reason: ALTCHOICE

## 2018-11-21 RX ORDER — SEVELAMER HYDROCHLORIDE 800 MG/1
1600 TABLET, FILM COATED ORAL
Qty: 180 TABLET | Refills: 1 | Status: SHIPPED | OUTPATIENT
Start: 2018-11-21 | End: 2020-11-03

## 2018-11-21 RX ORDER — CLOPIDOGREL BISULFATE 75 MG/1
75 TABLET ORAL DAILY
Qty: 30 TABLET | Refills: 1 | Status: SHIPPED | OUTPATIENT
Start: 2018-11-22

## 2018-11-21 RX ORDER — LANOLIN ALCOHOL/MO/W.PET/CERES
6 CREAM (GRAM) TOPICAL NIGHTLY
Start: 2018-11-21 | End: 2019-04-15 | Stop reason: ALTCHOICE

## 2018-11-21 RX ORDER — ACETAMINOPHEN AND CODEINE PHOSPHATE 300; 30 MG/1; MG/1
2 TABLET ORAL EVERY 6 HOURS PRN
Qty: 30 TABLET | Refills: 0 | Status: SHIPPED | OUTPATIENT
Start: 2018-11-21 | End: 2018-11-29

## 2018-11-21 RX ORDER — CHOLECALCIFEROL (VITAMIN D3) 1250 MCG
50000 CAPSULE ORAL
Qty: 4 CAPSULE | Refills: 1 | Status: SHIPPED | OUTPATIENT
Start: 2018-11-23

## 2018-11-21 RX ORDER — CYCLOBENZAPRINE HCL 5 MG
5 TABLET ORAL 3 TIMES DAILY PRN
Qty: 90 TABLET | Refills: 1 | Status: SHIPPED | OUTPATIENT
Start: 2018-11-21 | End: 2019-11-26

## 2018-11-21 RX ORDER — LISINOPRIL 10 MG/1
10 TABLET ORAL NIGHTLY
Qty: 30 TABLET | Refills: 1 | Status: SHIPPED | OUTPATIENT
Start: 2018-11-21 | End: 2023-03-31

## 2018-11-21 RX ORDER — DOXERCALCIFEROL 2 UG/ML
2 INJECTION, SOLUTION INTRAVENOUS 3 TIMES WEEKLY
Start: 2018-11-21 | End: 2019-11-26

## 2018-11-21 RX ADMIN — ACETAMINOPHEN AND CODEINE PHOSPHATE 2 TABLET: 300; 30 TABLET ORAL at 11:24

## 2018-11-21 RX ADMIN — FAMOTIDINE 40 MG: 40 TABLET ORAL at 08:26

## 2018-11-21 RX ADMIN — ACETAMINOPHEN AND CODEINE PHOSPHATE 2 TABLET: 300; 30 TABLET ORAL at 00:27

## 2018-11-21 RX ADMIN — SODIUM CHLORIDE, PRESERVATIVE FREE 3 ML: 5 INJECTION INTRAVENOUS at 08:26

## 2018-11-21 RX ADMIN — DOXERCALCIFEROL 2 MCG: 4 INJECTION, SOLUTION INTRAVENOUS at 16:00

## 2018-11-21 RX ADMIN — LABETALOL HYDROCHLORIDE 100 MG: 100 TABLET, FILM COATED ORAL at 08:26

## 2018-11-21 RX ADMIN — RENAGEL 1600 MG: 800 TABLET ORAL at 11:21

## 2018-11-21 RX ADMIN — SODIUM CHLORIDE, PRESERVATIVE FREE 3 ML: 5 INJECTION INTRAVENOUS at 08:27

## 2018-11-21 RX ADMIN — RENAGEL 1600 MG: 800 TABLET ORAL at 08:26

## 2018-11-21 RX ADMIN — SODIUM CHLORIDE 125 MG: 9 INJECTION, SOLUTION INTRAVENOUS at 13:54

## 2018-11-21 RX ADMIN — ISOSORBIDE MONONITRATE 30 MG: 30 TABLET, EXTENDED RELEASE ORAL at 08:26

## 2018-11-21 RX ADMIN — CLOPIDOGREL BISULFATE 75 MG: 75 TABLET ORAL at 08:26

## 2018-11-21 NOTE — PROGRESS NOTES
LOS: 6 days   Patient Care Team:  John Hyde MD as PCP - General (Family Medicine)    Chief Complaint: admitted for evaluations of congestive heart failure in leading to cardiac catheterization with chronically occluded CAD  Ischemic cardiomyopathy with a persistent LV dysfunction status post ICD implantation       Review of Systems:   ROS  Constitutional: Positive for fatigue. Negative for fever.   Respiratory: Positive for shortness of breath. Negative for cough, wheezing and stridor.    Gastrointestinal:. Negative for abdominal distention, abdominal pain, anal bleeding, diarrhea, rectal pain and vomiting.   Genitourinary: Negative for discharge, enuresis, frequency, genital sores, penile swelling and urgency.   Musculoskeletal: Positive for back pain. Negative for arthralgias and gait problem.   Skin Negative for color change and rash.   Neurological: Negative for tremors, seizures, facial asymmetry and headaches.   Psychiatric/Behavioral: Negative for agitation and confusion.   All other systems reviewed and are negative.        Objective     Current Facility-Administered Medications   Medication Dose Route Frequency Provider Last Rate Last Dose   • acetaminophen (TYLENOL) tablet 650 mg  650 mg Oral Q6H PRN Jethro Borges DO   650 mg at 11/18/18 1509   • acetaminophen-codeine (TYLENOL #3) 300-30 MG per tablet 2 tablet  2 tablet Oral Q6H PRN Kee Crockett MD   2 tablet at 11/21/18 0027   • albumin human 25 % IV SOLN 12.5 g  12.5 g Intravenous PRN Mark Crawley MD       • ceFAZolin (ANCEF) 1 g/100 mL 0.9% NS IVPB (mbp)  1 g Intravenous Once Garcia Haque MD       • clopidogrel (PLAVIX) tablet 75 mg  75 mg Oral Daily Robin Yi MD   75 mg at 11/21/18 0826   • cyclobenzaprine (FLEXERIL) tablet 5 mg  5 mg Oral TID PRN Kee Crockett MD   5 mg at 11/19/18 1227   • dextrose (D50W) 25 g/ 50mL Intravenous Solution 25 g  25 g Intravenous Q15 Min PRN Jethro Borges DO       • dextrose  (GLUTOSE) oral gel 15 g  15 g Oral Q15 Min PRN Jethro Borges DO       • doxercalciferol (HECTOROL) injection 2 mcg  2 mcg Intravenous Once per day on Mon Wed Fri Mark Crawley MD   2 mcg at 11/19/18 0930   • famotidine (PEPCID) tablet 40 mg  40 mg Oral Daily Jethro Borges DO   40 mg at 11/21/18 0826   • ferric gluconate (FERRLECIT) 125 mg in sodium chloride 0.9 % 110 mL IVPB  125 mg Intravenous Once per day on Mon Wed Fri Mark Crawley  mL/hr at 11/19/18 0930 125 mg at 11/19/18 0930   • glucagon (human recombinant) (GLUCAGEN DIAGNOSTIC) injection 1 mg  1 mg Subcutaneous PRN Jethro Borges DO       • heparin (porcine) injection 2,000 Units  2,000 Units Intracatheter PRN Mark Crawley MD       • insulin aspart (novoLOG) injection 0-14 Units  0-14 Units Subcutaneous 4x Daily AC & at Bedtime Jethro Borges DO   3 Units at 11/20/18 2012   • isosorbide mononitrate (IMDUR) 24 hr tablet 30 mg  30 mg Oral Q24H Robin Yi MD   30 mg at 11/21/18 0826   • labetalol (NORMODYNE) tablet 100 mg  100 mg Oral Q12H Mark Crawley MD   100 mg at 11/21/18 0826   • lisinopril (PRINIVIL,ZESTRIL) tablet 10 mg  10 mg Oral Nightly Mark Crawley MD   10 mg at 11/20/18 2012   • melatonin tablet 6 mg  6 mg Oral Nightly Jethro Borges DO   6 mg at 11/20/18 2012   • muscle rub (Chris-Monae) 10-15 % cream   Topical Q1H PRN Tristan Noonan MD       • ondansetron (ZOFRAN) injection 4 mg  4 mg Intravenous Q6H PRN Garcia Haque MD       • sevelamer (RENAGEL) tablet 1,600 mg  1,600 mg Oral TID With Meals Mark Crawley MD   1,600 mg at 11/21/18 0826   • sodium chloride 0.9 % flush 3 mL  3 mL Intravenous Q12H Jethro Borges DO   3 mL at 11/21/18 0827   • sodium chloride 0.9 % flush 3 mL  3 mL Intravenous Q12H Garcia Haque MD   3 mL at 11/21/18 0826   • sodium chloride 0.9 % flush 3-10 mL  3-10 mL Intravenous PRN Jethro Borges DO       • sodium chloride 0.9 % flush 3-10 mL   "3-10 mL Intravenous PRN Garcia Haque MD       • sodium chloride 0.9 % infusion  50 mL/hr Intravenous Continuous Garcia Haque MD       • Vitamin D3 50,000 Units  50,000 Units Oral Q7 Days Mark Crawley MD   50,000 Units at 11/16/18 1349       Vital Sign Min/Max for last 24 hours  Temp  Min: 97.4 °F (36.3 °C)  Max: 98.7 °F (37.1 °C)   BP  Min: 122/71  Max: 146/94   Pulse  Min: 80  Max: 90   Resp  Min: 16  Max: 18   SpO2  Min: 90 %  Max: 95 %   No Data Recorded   Weight  Min: 103 kg (227 lb)  Max: 103 kg (227 lb)     Flowsheet Rows      First Filed Value   Admission Height  180.3 cm (71\") Documented at 11/14/2018 2335   Admission Weight  106 kg (232 lb 9.6 oz) Documented at 11/14/2018 2335            Intake/Output Summary (Last 24 hours) at 11/21/2018 0906  Last data filed at 11/21/2018 0343  Gross per 24 hour   Intake 300 ml   Output 200 ml   Net 100 ml       Physical Exam:     General Appearance:    Alert, cooperative, in no acute distress   Lungs:     Clear to auscultation,respirations regular, even and                  unlabored    Heart:    Regular rhythm and normal rate, normal S1 and S2, no            murmur, no gallop, no rub, no click   Chest Wall:    No abnormalities observed   Abdomen:     Normal bowel sounds, no masses, no organomegaly, soft        non-tender, non-distended, no guarding, no rebound                tenderness   Extremities:   Moves all extremities well, no edema, no cyanosis, no             redness   Pulses:   Pulses palpable and equal bilaterally   Skin:   No bleeding, bruising or rash        Results Review:   I reviewed the patient's new clinical results.  Lab Results   Component Value Date    WBC 10.99 (H) 11/20/2018    HGB 10.4 (L) 11/20/2018    HCT 32.4 (L) 11/20/2018    MCV 89.5 11/20/2018     11/20/2018     Lab Results   Component Value Date    GLUCOSE 106 (H) 11/21/2018    BUN 72 (H) 11/21/2018    CREATININE 8.65 (H) 11/21/2018    EGFRIFNONA  11/21/2018      Comment: "      <15 Indicative of kidney failure.    EGFRIFAFRI 8 (L) 11/21/2018    BCR 8.3 11/21/2018    CO2 25.0 11/21/2018    CALCIUM 8.2 (L) 11/21/2018    ALBUMIN 3.00 (L) 11/21/2018    AST 47 03/26/2017    ALT 25 03/26/2017     Lab Results   Component Value Date    TSH 1.280 03/24/2017     Lab Results   Component Value Date    INR 1.14 11/16/2018    PROTIME 14.3 11/16/2018     No results found for: PTT    EKG:     Telemetry:    Ejection Fraction  Lab Results   Component Value Date    EF 21 07/28/2017       Echo EF Estimated  No results found for: ECHOEFEST      Present on Admission:  • Hypertension  • Diabetes mellitus (CMS/HCC)  • Acute on chronic systolic heart failure (CMS/HCC)  • ESRF (end stage renal failure) (CMS/HCC)  • Ischemic cardiomyopathy  • CAD (coronary artery disease)    Assessment/Plan   Ischemic cardiomyopathy with a persistent LV dysfunction #2 congestive heart failure multifactorial in etiology #3 chronic renal insufficiency on dialysis    Chest x-ray no pneumothorax reported.  Slight no hematoma or infection.  Posthospital management discussed with the patient and the family.  Keep it dry for one week, wound checkup on Tuesday in one week, do not lift her left arm and diarrhea for 30 days.  And I will see him in 3 months.  Patient is on appropriate medical management and treatment to continue.    Garcia Haque MD  11/21/18  9:06 AM      EMR Dragon/Transcription disclaimer:   Much of this encounter note is an electronic transcription/translation of spoken language to printed text. The electronic translation of spoken language may permit erroneous, or at times, nonsensical words or phrases to be inadvertently transcribed; Although I have reviewed the note for such errors, some may still exist.

## 2018-11-21 NOTE — PROGRESS NOTES
"[facility]  [address] [place] [address]. [address]   - [phone]  F - [phone]     Progress Note          PATIENT  DEMOGRAPHICS   PATIENT NAME: Jamil Olivo                      PHYSICIAN: Mark Crawley MD  : 1965  MRN: [id number]   LOS: 6 days    Patient Care Team:  John Hyde MD as PCP - General (Family Medicine)  Subjective   SUBJECTIVE   Doing well no distress or soa. bp is in 140s today         Objective   OBJECTIVE   Vital Signs  Temp:  [97.4 °F (36.3 °C)-98.7 °F (37.1 °C)] 98.5 °F (36.9 °C)  Heart Rate:  [80-90] 84  Resp:  [16-18] 18  BP: (122-146)/(71-96) 142/92    Flowsheet Rows      First Filed Value   Admission Height  180.3 cm (71\") Documented at 2018 2335   Admission Weight  106 kg (232 lb 9.6 oz) Documented at 2018 2335           I/O last 3 completed shifts:  In: 540 [P.O.:240; I.V.:300]  Out: 200 [Urine:200]    PHYSICAL EXAM    Physical Exam   Constitutional: He is oriented to person, place, and time. He appears well-developed.   HENT:   Head: Normocephalic.   Eyes: Pupils are equal, round, and reactive to light.   Neck: No JVD present.   Cardiovascular: Normal rate, regular rhythm and normal heart sounds.   Pulmonary/Chest: Effort normal and breath sounds normal.   Abdominal: Soft. Bowel sounds are normal.   Musculoskeletal: He exhibits edema.   Neurological: He is alert and oriented to person, place, and time.       RESULTS   Results Review:    Results from last 7 days   Lab Units  18   0557  18   0955  18   0844   SODIUM mmol/L  133*  135*  133*   POTASSIUM mmol/L  5.0  4.9  5.4*   CHLORIDE mmol/L  98  98  94*   CO2 mmol/L  25.0  27.0  26.0   BUN mg/dL  72*  59*  71*   CREATININE mg/dL  8.65*  8.01*  8.21*   CALCIUM mg/dL  8.2*  7.9*  8.2*   GLUCOSE mg/dL  106*  94  206*       Estimated Creatinine Clearance: 11.9 mL/min (A) (by C-G formula based on SCr of 8.65 mg/dL (H)).    Results from last 7 days   Lab Units "  11/21/18   0557  11/18/18   0526  11/17/18   0638   11/15/18   0517   MAGNESIUM mg/dL   --    --    --    --   2.5*   PHOSPHORUS mg/dL  8.1*  6.4*  5.8*   < >  5.2*    < > = values in this interval not displayed.             Results from last 7 days   Lab Units  11/20/18   0955  11/17/18   0638  11/16/18   1343  11/16/18   0541  11/15/18   0141   WBC 10*3/mm3  10.99*  8.22  8.21  7.01  10.20*   HEMOGLOBIN g/dL  10.4*  10.4*  10.7*  10.3*  11.5*   PLATELETS 10*3/mm3  259  297  302  298  315       Results from last 7 days   Lab Units  11/16/18   1343   INR   1.14         Imaging Results (last 24 hours)     Procedure Component Value Units Date/Time    XR Chest 1 View [[id number]] Collected:  11/20/18 1342     Updated:  11/20/18 1402    Narrative:       PROCEDURE: Portable chest x-ray    TECHNIQUE: Single AP view of the chest    COMPARISON: 11/19/2018    HISTORY: Post ICD placement    FINDINGS:     Life-support devices: Left-sided automatic implantable cardiac  defibrillator noted with its lead projecting over the right  ventricle. Right-sided hemodialysis catheter appears to be stable  in position with its tip in the right atrium.    Lungs/pleura: No overt pulmonary vascular congestion, focal  pulmonary parenchymal opacity, pleural effusion or pneumothorax.    Heart, hilar and mediastinal structures: Cardiac silhouette is  enlarged      Impression:       CONCLUSION:  Post left-sided automatic implantable cardiac defibrillator  placement.  No pneumothorax    Electronically signed by:  Andre Mehta MD  11/20/2018 2:01 PM  CST Workstation: [id number]           MEDICATIONS      ceFAZolin 1 g Intravenous Once   clopidogrel 75 mg Oral Daily   doxercalciferol 2 mcg Intravenous Once per day on Mon Wed Fri   famotidine 40 mg Oral Daily   ferric gluconate (FERRLECIT) IVPB 125 mg Intravenous Once per day on Mon Wed Fri   insulin aspart 0-14 Units Subcutaneous 4x Daily AC & at Bedtime   isosorbide mononitrate 30 mg Oral Q24H    labetalol 100 mg Oral Q12H   lisinopril 10 mg Oral Nightly   melatonin 6 mg Oral Nightly   sevelamer 1,600 mg Oral TID With Meals   sodium chloride 3 mL Intravenous Q12H   sodium chloride 3 mL Intravenous Q12H   Vitamin D3 50,000 Units Oral Q7 Days       sodium chloride 50 mL/hr       Assessment/Plan   ASSESSMENT / PLAN      Acute on chronic systolic heart failure (CMS/HCC)    Diabetes mellitus (CMS/HCC)    Hypertension    Ischemic cardiomyopathy    ESRF (end stage renal failure) (CMS/HCC)    CAD (coronary artery disease)    1.CKD 5 with the worsening creatinine and now fluid overload with the pulmonary edema and anasarca / ESRD.  Patient creatinine is up to 6.3 peak. We started dialysis on 11/16/18. Hd m/w/f here and out patient TTS (bc of holiday it will be Friday this week and Monday and Thursday  next week) appreciate dr Hoang help with tunnel cath. Groin cath is out. PD catheter and training later (prob in jan 2019). edw is around 222-223 lbs and will avoid large uf today     13 gm of protein. 24 hr cr cl is 12 ml/min.     2.hypertension poorly controlled earlier now much better and low. keep lisinopril to 10mg hs. Avoid labetalol in am of dialysis. off hydralazine and amlodipine and tenex.      3.CKD/minimal and bone disorder.  We'll do a renal diet for now.  keep renagel and increase it to 2 tab, keep vitamin d and hectorol    4. Anemia of ckd low t sat - on iv fe with hd.     5. CAD ICM EF 20%. 100% occlusion RCA and CX. Plan for AICD    6. dispo dc today post dialysis    Dr Guzmán will follow him in out patient clinic                This document has been electronically signed by Mark Crawley MD on November 21, 2018 9:31 AM

## 2018-11-21 NOTE — PLAN OF CARE
Problem: Patient Care Overview  Goal: Plan of Care Review  Outcome: Ongoing (interventions implemented as appropriate)   11/21/18 0423   Coping/Psychosocial   Plan of Care Reviewed With patient   Plan of Care Review   Progress improving   OTHER   Outcome Summary d/c after dialysis today        Problem: Cardiac: Heart Failure (Adult)  Goal: Signs and Symptoms of Listed Potential Problems Will be Absent, Minimized or Managed (Cardiac: Heart Failure)  Outcome: Ongoing (interventions implemented as appropriate)      Problem: Diabetes, Type 2 (Adult)  Goal: Signs and Symptoms of Listed Potential Problems Will be Absent, Minimized or Managed (Diabetes, Type 2)  Outcome: Ongoing (interventions implemented as appropriate)      Problem: Pain, Acute (Adult)  Goal: Acceptable Pain Control/Comfort Level  Outcome: Ongoing (interventions implemented as appropriate)

## 2018-11-21 NOTE — DISCHARGE SUMMARY
Discharge Summary  Kee Crockett MD  Hospitalist     Date of Discharge:  11/21/2018    Discharge Diagnosis:      Acute on chronic systolic heart failure (CMS/[facility])    Ischemic cardiomyopathy    ESRF (end stage renal failure) (CMS/[facility])    Hypertension    CAD (coronary artery disease)    Diabetes mellitus (CMS/[facility])      Presenting Problem/History of Present Illness  Shortness of breath and leg edema    Hospital Course  Patient is a 53 y.o. male admitted for shortness of breath/lower extremity edema and weight gain. On admission he was found to have a blood pressure over 200/130, a creatinine of 5.7 and a significantly diminished urinary output.    A cardiac catheter obtained during this admission showed extensive coronary artery disease with a poor LV EF of 20% or less. His overall condition, blood pressure values and symptoms improved with the help of hemodialysis. Given his poor ejection fraction the patient was fitted with an AICD.    His vital signs are stable, his O2 sat on room air is anywhere between 90-92%.  He will continue with hemodialysis 3 times a week (Mondays Wednesdays and Fridays).    Procedures Performed  Procedure(s): AICD placement / dialysis catheter placement / cardiac cath    Consults:   Consults     Date and Time Order Name Status Description    11/16/2018 1242 Inpatient Cardiology Consult Completed     11/15/2018 0046 Inpatient Nephrology Consult Completed     11/15/2018 0046 Inpatient Cardiology Consult Completed           Pertinent Test Results: Extensive coronary artery disease/poor LV EF (20%) on the cardiac cath performed 11/16/18    Condition on Discharge:  Stable    Vital Signs  Temp:  [97.4 °F (36.3 °C)-98.7 °F (37.1 °C)] 98.5 °F (36.9 °C)  Heart Rate:  [80-90] 84  Resp:  [16-18] 18  BP: (122-142)/(71-96) 142/92    Physical Exam:  Physical Exam   Constitutional: He is oriented to person, place, and time. He appears well-developed and well-nourished. No distress.   HENT:   Head:  Normocephalic and atraumatic.   Eyes: EOM are normal. Pupils are equal, round, and reactive to light. No scleral icterus.   Neck: Normal range of motion. Neck supple.   Cardiovascular: Normal rate and regular rhythm. Exam reveals no friction rub.   No murmur heard.  Pulmonary/Chest: Effort normal and breath sounds normal. No stridor. No respiratory distress. He has no wheezes.   Abdominal: Soft. Bowel sounds are normal. He exhibits no distension. There is no tenderness.   Musculoskeletal: He exhibits edema (minimal). He exhibits no tenderness.   Neurological: He is alert and oriented to person, place, and time. No cranial nerve deficit. Coordination normal.   Skin: Skin is warm and dry. No rash noted. There is pallor.   Psychiatric: He has a normal mood and affect. His behavior is normal.   Vitals reviewed.      Discharge Disposition  Home or Self Care    Discharge Medications     Discharge Medications      New Medications      Instructions Start Date   acetaminophen-codeine 300-30 MG per tablet  Commonly known as:  TYLENOL #3   2 tablets, Oral, Every 6 Hours PRN      clopidogrel 75 MG tablet  Commonly known as:  PLAVIX   75 mg, Oral, Daily      cyclobenzaprine 5 MG tablet  Commonly known as:  FLEXERIL   5 mg, Oral, 3 Times Daily PRN      doxercalciferol 4 MCG/2ML injection  Commonly known as:  HECTOROL   2 mcg, Intravenous, 3 Times Weekly      ferric gluconate 125 mg in sodium chloride 0.9 % 100 mL IVPB   125 mg, Intravenous, 3 Times Weekly      lisinopril 10 MG tablet  Commonly known as:  PRINIVIL,ZESTRIL   10 mg, Oral, Nightly      melatonin 3 MG tablet   6 mg, Oral, Nightly      sevelamer 800 MG tablet  Commonly known as:  RENAGEL   1,600 mg, Oral, 3 Times Daily With Meals      Vitamin D3 88477 units capsule   50,000 Units, Oral, Every 7 Days         Changes to Medications      Instructions Start Date   isosorbide mononitrate 60 MG 24 hr tablet  Commonly known as:  IMDUR  What changed:    · how much to  take  · when to take this   60 mg, Oral, Every 24 Hours Scheduled      labetalol 100 MG tablet  Commonly known as:  NORMODYNE  What changed:    · how much to take  · when to take this   100 mg, Oral, 2 Times Daily         Continue These Medications      Instructions Start Date   albuterol 108 (90 Base) MCG/ACT inhaler  Commonly known as:  PROVENTIL HFA;VENTOLIN HFA   2 puffs, Inhalation, Every 4 Hours PRN      carvedilol 25 MG tablet  Commonly known as:  COREG   25 mg, Oral, 2 Times Daily With Meals      hydrALAZINE 50 MG tablet  Commonly known as:  APRESOLINE   75 mg, Oral, 3 Times Daily      insulin detemir 100 UNIT/ML injection  Commonly known as:  LEVEMIR   30 Units, Subcutaneous, Nightly         Stop These Medications    acetaminophen 325 MG tablet  Commonly known as:  TYLENOL     amLODIPine 10 MG tablet  Commonly known as:  NORVASC     benazepril 20 MG tablet  Commonly known as:  LOTENSIN     furosemide 40 MG tablet  Commonly known as:  LASIX     guanFACINE 1 MG tablet  Commonly known as:  TENEX     insulin glargine 100 UNIT/ML injection  Commonly known as:  LANTUS     metOLazone 10 MG tablet  Commonly known as:  ZAROXOLYN            Discharge Diet:   Diet Instructions     Diet: Cardiac, Renal      Discharge Diet:   Cardiac  Renal             Activity at Discharge:   Activity Instructions     Activity as Tolerated            Follow-up Appointments  Future Appointments   Date Time Provider Department [place]   11/27/2018  9:00 AM [facility] NURSE ALISHA [facility] CARD [place]   2/22/2019  9:45 AM Garcia Haque MD [facility] GONZAL[facility] None     Additional Instructions for the Follow-ups that You Need to Schedule     Discharge Follow-up with PCP   As directed       Currently Documented PCP:    John Hyde MD    PCP Phone Number:    [phone]     Follow Up Details:  in 1 week         Discharge Follow-up with Specified Provider: dialysis in 2 days   As directed      To:  dialysis in 2 days                Kee Crockett  MD  11/21/18  2:47 PM

## 2018-11-22 ENCOUNTER — READMISSION MANAGEMENT (OUTPATIENT)
Dept: CALL CENTER | Facility: HOSPITAL | Age: 53
End: 2018-11-22

## 2018-11-22 NOTE — OUTREACH NOTE
Prep Survey      Responses   Facility patient discharged from?  [place]   Is patient eligible?  Yes   Discharge diagnosis  Acute on chronic systolic heart failure    Does the patient have one of the following disease processes/diagnoses(primary or secondary)?  CHF   Does the patient have Home health ordered?  No   Is there a DME ordered?  No   Medication alerts for this patient  Plavix    Prep survey completed?  Yes          Stacia Ocampo RN

## 2018-11-22 NOTE — NURSING NOTE
Pt given post AICD care pamphlet   Pt instructed to keep dry and do not lift left arm above shoulder for 30 days   No lifting with left arm and pt is to wear his immobilizer so no leads are displaced

## 2018-11-23 ENCOUNTER — READMISSION MANAGEMENT (OUTPATIENT)
Dept: CALL CENTER | Facility: HOSPITAL | Age: 53
End: 2018-11-23

## 2018-11-23 NOTE — OUTREACH NOTE
CHF Week 1 Survey      Responses   Facility patient discharged from?  [facility]   Does the patient have one of the following disease processes/diagnoses(primary or secondary)?  CHF   Is there a successful TCM telephone encounter documented?  No   CHF Week 1 attempt successful?  Yes   Call start time  1451   Call end time  1508   General alerts for this patient  HD   Discharge diagnosis  Acute on chronic systolic heart failure    Medication alerts for this patient  Plavix    Meds reviewed with patient/caregiver?  Yes   Is the patient having any side effects they believe may be caused by any medication additions or changes?  No   Does the patient have all medications ordered at discharge?  No   What is keeping the patient from filling the prescriptions?  Lost script/didn't receive [pt thinks pharmacy did not get the order. he could not recall all of the meds]   Nursing Interventions  Nurse provided patient education   Notified Case Management  Script issues   Is the patient taking all medications as directed (includes completed medication regime)?  No   What is preventing the patient from taking all medications as directed?  Other   Nursing Interventions  Nurse provided patient education   Does the patient have a primary care provider?   Yes   Does the patient have an appointment with their PCP within 7 days of discharge?  Yes   Has the patient kept scheduled appointments due by today?  N/A   Comments  Going to HD m/w/fri and then will switch to a Tues/Thurs/sat schedule   Has home health visited the patient within 72 hours of discharge?  N/A   Psychosocial issues?  No   Did the patient receive a copy of their discharge instructions?  Yes   Nursing interventions  Reviewed instructions with patient   What is the patient's perception of their health status since discharge?  Improving   Nursing interventions  Nurse provided patient education   Is the patient weighing daily?  No   Is the patient able to teach back  Heart Failure diet management?  Yes   Is the patient able to teach back Heart Failure Zones?  Yes   Is the patient able to teach back signs and symptoms of worsening condition? (i.e. weight gain, shortness of air, etc.)  Yes   Is the patient/caregiver able to teach back the hierarchy of who to call/visit for symptoms/problems? PCP, Specialist, Home health nurse, Urgent Care, ED, 911  Yes   Additional teach back comments  Pt weighs at HD. Note sent to  as pt did not have all meds    CHF Week 1 call completed?  Yes          Roseline Bain RN

## 2018-11-30 ENCOUNTER — READMISSION MANAGEMENT (OUTPATIENT)
Dept: CALL CENTER | Facility: HOSPITAL | Age: 53
End: 2018-11-30

## 2018-11-30 NOTE — OUTREACH NOTE
CHF Week 2 Survey      Responses   Facility patient discharged from?  [facility]   Does the patient have one of the following disease processes/diagnoses(primary or secondary)?  CHF   Week 2 attempt successful?  Yes   Call start time  1208   Call end time  1211   General alerts for this patient  HD   Discharge diagnosis  Acute on chronic systolic heart failure    Meds reviewed with patient/caregiver?  Yes   Is the patient having any side effects they believe may be caused by any medication additions or changes?  No   Does the patient have all medications ordered at discharge?  Yes   Is the patient taking all medications as directed (includes completed medication regime)?  Yes   Medication comments  No med complaints this week.   Does the patient have a primary care provider?   Yes   Does the patient have an appointment with their PCP within 7 days of discharge?  Yes   Has the patient kept scheduled appointments due by today?  Yes   Has home health visited the patient within 72 hours of discharge?  N/A   Psychosocial issues?  No   Did the patient receive a copy of their discharge instructions?  Yes   Nursing interventions  Reviewed instructions with patient   What is the patient's perception of their health status since discharge?  Improving   Nursing interventions  Nurse provided patient education   Is the patient weighing daily?  Yes   Does the patient have scales?  Yes   Daily weight interventions  Education provided on importance of daily weight   Is the patient able to teach back Heart Failure diet management?  Yes   Is the patient able to teach back Heart Failure Zones?  Yes   Is the patient able to teach back signs and symptoms of worsening condition? (i.e. weight gain, shortness of air, etc.)  Yes   Is the patient/caregiver able to teach back the hierarchy of who to call/visit for symptoms/problems? PCP, Specialist, Home health nurse, Urgent Care, ED, 911  Yes   Additional teach back comments  CHF education  provided.   CHF Week 2 call completed?  Yes          Danis Doyle RN

## 2018-12-07 ENCOUNTER — READMISSION MANAGEMENT (OUTPATIENT)
Dept: CALL CENTER | Facility: HOSPITAL | Age: 53
End: 2018-12-07

## 2018-12-07 NOTE — OUTREACH NOTE
CHF Week 3 Survey      Responses   Facility patient discharged from?  [facility]   Does the patient have one of the following disease processes/diagnoses(primary or secondary)?  CHF   Week 3 attempt successful?  Yes   Call start time  1402   General alerts for this patient  HD   Discharge diagnosis  Acute on chronic systolic heart failure    Medication alerts for this patient  Plavix    Meds reviewed with patient/caregiver?  Yes   Is the patient having any side effects they believe may be caused by any medication additions or changes?  No   Does the patient have all medications ordered at discharge?  Yes   Is the patient taking all medications as directed (includes completed medication regime)?  Yes   Does the patient have a primary care provider?   Yes   Has the patient kept scheduled appointments due by today?  Yes   Comments  Dialysis Tues/Thurs/Sat   Has home health visited the patient within 72 hours of discharge?  N/A   Psychosocial issues?  No   Did the patient receive a copy of their discharge instructions?  Yes   Nursing interventions  Reviewed instructions with patient   What is the patient's perception of their health status since discharge?  Improving   Nursing interventions  Nurse provided patient education   Is the patient weighing daily?  No [weighing at dialysis]   Does the patient have scales?  Yes   Daily weight interventions  Education provided on importance of daily weight          Felicitas Kennedy RN

## 2018-12-15 ENCOUNTER — READMISSION MANAGEMENT (OUTPATIENT)
Dept: CALL CENTER | Facility: HOSPITAL | Age: 53
End: 2018-12-15

## 2018-12-15 NOTE — OUTREACH NOTE
Medical Week 4 Survey      Responses   Facility patient discharged from?  [facility]   Does the patient have one of the following disease processes/diagnoses(primary or secondary)?  CHF   Call start time  1030   Call end time  1033   General alerts for this patient  HD   Discharge diagnosis  Acute on chronic systolic heart failure    Meds reviewed with patient/caregiver?  Yes   Is the patient taking all medications as directed (includes completed medication regime)?  Yes   Has the patient kept scheduled appointments due by today?  Yes   Comments  Dialysis Tues/Thurs/Sat   Is the patient still receiving Home Health Services?  N/A   What is the patient's perception of their health status since discharge?  Improving   Is the patient/caregiver able to teach back the hierarchy of who to call/visit for symptoms/problems? PCP, Specialist, Home health nurse, Urgent Care, ED, 911  Yes   Additional teach back comments  Pt states he is doing well. No LE fluid, meds going well and he is only having issues with his neuropathy at this time.   Would the patient like one additional call?  No   Graduated  Yes   Did the patient feel the follow up calls were helpful during their recovery period?  Yes   Was the number of calls appropriate?  Yes          Roseline Bain RN

## 2019-01-11 ENCOUNTER — CONSULT (OUTPATIENT)
Dept: SURGERY | Facility: CLINIC | Age: 54
End: 2019-01-11

## 2019-01-11 VITALS
HEIGHT: 71 IN | BODY MASS INDEX: 31.53 KG/M2 | DIASTOLIC BLOOD PRESSURE: 90 MMHG | TEMPERATURE: 98 F | WEIGHT: 225.2 LBS | HEART RATE: 105 BPM | SYSTOLIC BLOOD PRESSURE: 160 MMHG

## 2019-01-11 DIAGNOSIS — N18.4 STAGE 4 CHRONIC KIDNEY DISEASE (HCC): Primary | ICD-10-CM

## 2019-01-11 PROCEDURE — 99203 OFFICE O/P NEW LOW 30 MIN: CPT | Performed by: SURGERY

## 2019-01-11 RX ORDER — BUPIVACAINE HCL/0.9 % NACL/PF 0.1 %
2 PLASTIC BAG, INJECTION (ML) EPIDURAL ONCE
Status: CANCELLED | OUTPATIENT
Start: 2019-01-21 | End: 2019-01-11

## 2019-01-11 RX ORDER — SODIUM CHLORIDE 9 MG/ML
50 INJECTION, SOLUTION INTRAVENOUS CONTINUOUS
Status: CANCELLED | OUTPATIENT
Start: 2019-01-21

## 2019-01-11 NOTE — PATIENT INSTRUCTIONS

## 2019-01-11 NOTE — PROGRESS NOTES
Chief Complaint   Patient presents with   • Chronic Renal Failure     Peritoneal dialysis catheter        HPI  Stage 4 renal failure requiring hemodialysis. He desires to start peritoneal dialysis. No previous abdominal surgery.  Past Medical History:   Diagnosis Date   • Asthma    • CAD (coronary artery disease) 11/19/2018   • CHF (congestive heart failure) (CMS/[facility])    • Diabetes mellitus (CMS/[facility])    • Diabetic neuropathy (CMS/[facility]) 2014   • Hypertension    • Kidney disease 2018    pt states that he is close to needing to be on dialysis       Past Surgical History:   Procedure Laterality Date   • CARDIAC CATHETERIZATION N/A 11/16/2018    Procedure: Left Heart Cath;  Surgeon: Robin Yi MD;  Location: [facility] CATH INVASIVE LOCATION;  Service: Cardiology   • CARDIAC ELECTROPHYSIOLOGY PROCEDURE N/A 11/20/2018    Procedure: ICD SC new;  Surgeon: Garcia Haque MD;  Location: [facility] CATH INVASIVE LOCATION;  Service: Cardiology   • EYE SURGERY Bilateral 12/2017   • INTERVENTIONAL RADIOLOGY PROCEDURE N/A 11/19/2018    Procedure: tunneled central venous catheter placement;  Surgeon: Kip Hoang MD;  Location: [facility] ANGIO INVASIVE LOCATION;  Service: Interventional Radiology         Current Outpatient Medications:   •  albuterol (PROVENTIL HFA;VENTOLIN HFA) 108 (90 BASE) MCG/ACT inhaler, Inhale 2 puffs Every 4 (Four) Hours As Needed for Wheezing., Disp: 1 inhaler, Rfl: 1  •  Cholecalciferol (VITAMIN D3) 52375 units capsule, Take 1 capsule by mouth Every 7 (Seven) Days., Disp: 4 capsule, Rfl: 1  •  clopidogrel (PLAVIX) 75 MG tablet, Take 1 tablet by mouth Daily., Disp: 30 tablet, Rfl: 1  •  cyclobenzaprine (FLEXERIL) 5 MG tablet, Take 1 tablet by mouth 3 (Three) Times a Day As Needed for Muscle Spasms (for pain)., Disp: 90 tablet, Rfl: 1  •  doxercalciferol (HECTOROL) 4 MCG/2ML injection, Infuse 1 mL into a venous catheter 3 (Three) Times a Week., Disp: , Rfl:   •  hydrALAZINE (APRESOLINE) 50 MG tablet,  Take 75 mg by mouth 3 (Three) Times a Day., Disp: , Rfl:   •  insulin detemir (LEVEMIR) 100 UNIT/ML injection, Inject 30 Units under the skin Every Night., Disp: 10 mL, Rfl: 1  •  labetalol (NORMODYNE) 100 MG tablet, Take 1 tablet by mouth 2 (Two) Times a Day., Disp: 60 tablet, Rfl: 1  •  lisinopril (PRINIVIL,ZESTRIL) 10 MG tablet, Take 1 tablet by mouth Every Night., Disp: 30 tablet, Rfl: 1  •  melatonin 3 MG tablet, Take 2 tablets by mouth Every Night., Disp: , Rfl:   •  sevelamer (RENAGEL) 800 MG tablet, Take 2 tablets by mouth 3 (Three) Times a Day With Meals., Disp: 180 tablet, Rfl: 1  •  carvedilol (COREG) 25 MG tablet, Take 25 mg by mouth 2 (Two) Times a Day With Meals., Disp: , Rfl:   •  isosorbide mononitrate (IMDUR) 60 MG 24 hr tablet, Take 1 tablet by mouth Daily. (Patient taking differently: Take 10 mg by mouth 3 (Three) Times a Day.), Disp: 30 tablet, Rfl: 1    Allergies   Allergen Reactions   • Motrin [Ibuprofen] Hives       Family History   Problem Relation Age of Onset   • Hypertension Mother    • Diabetes Mother    • Hypertension Father    • Diabetes Father    • Asthma Maternal Grandfather        Social History     Socioeconomic History   • Marital status:      Spouse name: Not on file   • Number of children: Not on file   • Years of education: Not on file   • Highest education level: Not on file   Social Needs   • Financial resource strain: Not on file   • Food insecurity - worry: Not on file   • Food insecurity - inability: Not on file   • Transportation needs - medical: Not on file   • Transportation needs - non-medical: Not on file   Occupational History   • Not on file   Tobacco Use   • Smoking status: Never Smoker   • Smokeless tobacco: Never Used   Substance and Sexual Activity   • Alcohol use: No   • Drug use: No   • Sexual activity: Defer   Other Topics Concern   • Not on file   Social History Narrative   • Not on file       Review of Systems   Constitutional: Negative for activity  change, appetite change, chills and fever.   HENT: Negative for hearing loss, nosebleeds and trouble swallowing.    Cardiovascular: Negative for chest pain, palpitations and leg swelling.   Gastrointestinal: Negative for abdominal distention, abdominal pain, anal bleeding, blood in stool, constipation, diarrhea, nausea, rectal pain and vomiting.   Endocrine: Negative for cold intolerance, heat intolerance, polydipsia and polyuria.   Genitourinary: Negative for decreased urine volume, difficulty urinating, dysuria, enuresis, frequency, hematuria and urgency.   Musculoskeletal: Negative for arthralgias, back pain, gait problem, myalgias and neck pain.   Skin: Negative for pallor, rash and wound.   Allergic/Immunologic: Negative for immunocompromised state.   Neurological: Negative for dizziness, seizures, weakness, light-headedness, numbness and headaches.   Psychiatric/Behavioral: Negative for agitation and behavioral problems. The patient is not nervous/anxious.        Physical Exam   Constitutional: He is oriented to person, place, and time. He appears well-developed and well-nourished.   HENT:   Head: Normocephalic and atraumatic.   Nose: Nose normal.   Eyes: Conjunctivae and EOM are normal. Right eye exhibits no discharge. Left eye exhibits no discharge.   Neck: Trachea normal, normal range of motion and phonation normal. Neck supple. No JVD present. No tracheal deviation and no edema present. No thyromegaly present.   Cardiovascular: Normal rate, regular rhythm and normal heart sounds. Exam reveals no gallop and no friction rub.   No murmur heard.  Pulmonary/Chest: Effort normal and breath sounds normal. No accessory muscle usage. No respiratory distress. He has no decreased breath sounds. He has no wheezes. He has no rales. He exhibits no tenderness.   Abdominal: Soft. He exhibits no distension, no fluid wave, no ascites, no pulsatile midline mass and no mass. There is no tenderness. There is no rebound and no  guarding. No hernia.   Musculoskeletal: Normal range of motion. He exhibits no edema, tenderness or deformity.   Lymphadenopathy:     He has no cervical adenopathy.        Left: No supraclavicular adenopathy present.   Neurological: He is alert and oriented to person, place, and time. He has normal strength. No cranial nerve deficit.   Skin: Skin is warm and dry. No rash noted. He is not diaphoretic. No erythema. No pallor.   Psychiatric: He has a normal mood and affect. His speech is normal and behavior is normal. Judgment and thought content normal. Cognition and memory are normal.   Vitals reviewed.        ASSESSMENT    Jamil was seen today for chronic renal failure.    Diagnoses and all orders for this visit:    Stage 4 chronic kidney disease (CMS/[facility])  -     ECG 12 Lead; Future  -     ceFAZolin (ANCEF) 2 g in Sodium chloride 0.9 % 100 mL IVPB; Infuse 2 g into a venous catheter 1 (One) Time.  -     Case Request; Standing  -     Sodium chloride 0.9 % infusion; Infuse 50 mL/hr into a venous catheter Continuous.  -     Case Request    Other orders  -     Follow anesthesia standing orders.  -     Provide NPO Instructions to Patient; Future  -     Follow anesthesia standing orders.; Standing  -     Clorhexidine skin prep; Future  -     Verify NPO Status; Standing  -     Obtain informed consent; Standing  -     SCD (sequential compression device)- to be placed on patient in Pre-op; Standing        PLAN    1. Laparoscopic possible open PD catheter placement    The risks of bleeding, leak, visceral injury, exit site infection, peritonitis, exit site infection, leak, catheter dysfunction, inadequate dialysis. are explained. Alternatives include continues hemodialysis. He understands and agrees.          This document has been electronically signed by Valerio Webster MD on January 13, 2019 6:12 PM

## 2019-03-04 ENCOUNTER — CLINICAL SUPPORT (OUTPATIENT)
Dept: CARDIOLOGY | Facility: CLINIC | Age: 54
End: 2019-03-04

## 2019-03-04 DIAGNOSIS — Z95.810 AICD (AUTOMATIC CARDIOVERTER/DEFIBRILLATOR) PRESENT: ICD-10-CM

## 2019-03-04 DIAGNOSIS — I25.5 ISCHEMIC CARDIOMYOPATHY: Primary | Chronic | ICD-10-CM

## 2019-03-05 PROCEDURE — 93289 INTERROG DEVICE EVAL HEART: CPT | Performed by: NURSE PRACTITIONER

## 2019-03-28 DIAGNOSIS — Z01.818 PREOPERATIVE TESTING: Primary | ICD-10-CM

## 2019-04-15 ENCOUNTER — OFFICE VISIT (OUTPATIENT)
Dept: CARDIAC SURGERY | Facility: CLINIC | Age: 54
End: 2019-04-15

## 2019-04-15 VITALS
DIASTOLIC BLOOD PRESSURE: 80 MMHG | TEMPERATURE: 98.2 F | HEART RATE: 57 BPM | HEIGHT: 71 IN | SYSTOLIC BLOOD PRESSURE: 135 MMHG | WEIGHT: 209 LBS | OXYGEN SATURATION: 98 % | BODY MASS INDEX: 29.26 KG/M2

## 2019-04-15 DIAGNOSIS — Z79.4 TYPE 2 DIABETES MELLITUS WITH DIABETIC POLYNEUROPATHY, WITH LONG-TERM CURRENT USE OF INSULIN (HCC): Chronic | ICD-10-CM

## 2019-04-15 DIAGNOSIS — I10 ESSENTIAL HYPERTENSION: Chronic | ICD-10-CM

## 2019-04-15 DIAGNOSIS — N18.6 ESRD (END STAGE RENAL DISEASE) ON DIALYSIS (HCC): Primary | ICD-10-CM

## 2019-04-15 DIAGNOSIS — E11.42 TYPE 2 DIABETES MELLITUS WITH DIABETIC POLYNEUROPATHY, WITH LONG-TERM CURRENT USE OF INSULIN (HCC): Chronic | ICD-10-CM

## 2019-04-15 DIAGNOSIS — Z99.2 ESRD (END STAGE RENAL DISEASE) ON DIALYSIS (HCC): Primary | ICD-10-CM

## 2019-04-15 DIAGNOSIS — I25.10 CORONARY ARTERY DISEASE INVOLVING NATIVE CORONARY ARTERY OF NATIVE HEART WITHOUT ANGINA PECTORIS: Chronic | ICD-10-CM

## 2019-04-15 PROCEDURE — 99215 OFFICE O/P EST HI 40 MIN: CPT | Performed by: THORACIC SURGERY (CARDIOTHORACIC VASCULAR SURGERY)

## 2019-04-15 RX ORDER — SODIUM CHLORIDE 9 MG/ML
50 INJECTION, SOLUTION INTRAVENOUS CONTINUOUS
Status: CANCELLED | OUTPATIENT
Start: 2019-04-24

## 2019-04-15 RX ORDER — INSULIN GLARGINE 100 [IU]/ML
30 INJECTION, SOLUTION SUBCUTANEOUS NIGHTLY
COMMUNITY

## 2019-04-15 RX ORDER — BUPIVACAINE HCL/0.9 % NACL/PF 0.1 %
2 PLASTIC BAG, INJECTION (ML) EPIDURAL ONCE
Status: CANCELLED | OUTPATIENT
Start: 2019-04-24 | End: 2019-04-15

## 2019-04-15 NOTE — PROGRESS NOTES
4/15/2019    Jamil Olivo  1965    Chief Complaint:    Chief Complaint   Patient presents with   • Hemodialysis Access       HPI:      PCP:  John Hyde MD  Cardiology:    Nephrology:      53 y.o. male with HTN(stable, increased risk stroke, rupture), Hyperlipidemia(stable, increased risk cardiovascular events), Diabetes Mellitus(stable, increased risk cardiovascular events) and Chronic Kidney Disease(stable, increased risk renal failure) .  never smoked.  ESRD on hemodialysis since 11/2018 via tunnel catheter.  Requires long term AV access for hemodialysis.  No other associated signs, symptoms or modifying factors.    4/2019 Upper extremity vein mapping:  RIGHT cephalic 1.1-4.3mm, basilic 1.4-2.6mm.  LEFT cephalic 2.6-5.2mm.  Distal radial 2.0mm.  High bifurcation brachial artery prox mid humerus.    3/2017 Echocardiogram:  EF 15% LA 54mm, LV 70mm, RVSP 52mmHg.  Mild TR.  Mild-moderate MR.  8/2017 Stress Test:  EF 20%, moderate to severe inferolateral basal ischemia.  Moderate inferior scar.  11/2018 Cardiac Catheterization:  LAD mild irregularities, %, %.  EF 20%.  11/2018 ICD placement (LEFT SCV)    The following portions of the patient's history were reviewed and updated as appropriate: allergies, current medications, past family history, past medical history, past social history, past surgical history and problem list.  Recent images independently reviewed.  Available laboratory values reviewed.    PMH:  Past Medical History:   Diagnosis Date   • Asthma    • CAD (coronary artery disease) 11/19/2018   • CHF (congestive heart failure) (CMS/[facility])    • Diabetes mellitus (CMS/[facility])    • Diabetic neuropathy (CMS/HCC) 2014   • Hypertension    • Kidney disease 2018    pt states that he is close to needing to be on dialysis     Family History   Problem Relation Age of Onset   • Hypertension Mother    • Diabetes Mother    • Hypertension Father    • Diabetes Father    • Asthma Maternal  Grandfather      Social History     Tobacco Use   • Smoking status: Never Smoker   • Smokeless tobacco: Never Used   Substance Use Topics   • Alcohol use: No   • Drug use: No       ALLERGIES:  Allergies   Allergen Reactions   • Motrin [Ibuprofen] Hives         MEDICATIONS:    Current Outpatient Medications:   •  albuterol (PROVENTIL HFA;VENTOLIN HFA) 108 (90 BASE) MCG/ACT inhaler, Inhale 2 puffs Every 4 (Four) Hours As Needed for Wheezing., Disp: 1 inhaler, Rfl: 1  •  Cholecalciferol (VITAMIN D3) 87288 units capsule, Take 1 capsule by mouth Every 7 (Seven) Days., Disp: 4 capsule, Rfl: 1  •  clopidogrel (PLAVIX) 75 MG tablet, Take 1 tablet by mouth Daily., Disp: 30 tablet, Rfl: 1  •  cyclobenzaprine (FLEXERIL) 5 MG tablet, Take 1 tablet by mouth 3 (Three) Times a Day As Needed for Muscle Spasms (for pain)., Disp: 90 tablet, Rfl: 1  •  doxercalciferol (HECTOROL) 4 MCG/2ML injection, Infuse 1 mL into a venous catheter 3 (Three) Times a Week., Disp: , Rfl:   •  hydrALAZINE (APRESOLINE) 50 MG tablet, Take 75 mg by mouth 3 (Three) Times a Day., Disp: , Rfl:   •  insulin glargine (LANTUS) 100 UNIT/ML injection, Inject  under the skin into the appropriate area as directed Daily., Disp: , Rfl:   •  labetalol (NORMODYNE) 100 MG tablet, Take 1 tablet by mouth 2 (Two) Times a Day., Disp: 60 tablet, Rfl: 1  •  lisinopril (PRINIVIL,ZESTRIL) 10 MG tablet, Take 1 tablet by mouth Every Night., Disp: 30 tablet, Rfl: 1  •  sevelamer (RENAGEL) 800 MG tablet, Take 2 tablets by mouth 3 (Three) Times a Day With Meals., Disp: 180 tablet, Rfl: 1    Review of Systems   Review of Systems   Constitution: Positive for malaise/fatigue. Negative for weakness and weight loss.   Cardiovascular: Negative for chest pain, claudication and dyspnea on exertion.   Respiratory: Negative for cough and shortness of breath.    Skin: Negative for color change and poor wound healing.   Neurological: Negative for dizziness and numbness.       Physical Exam    Physical Exam   Constitutional: He is oriented to person, place, and time. He is active and cooperative. He does not appear ill. No distress.   HENT:   Right Ear: Hearing normal.   Left Ear: Hearing normal.   Nose: No nasal deformity. No epistaxis.   Mouth/Throat: He does not have dentures. Normal dentition.   Cardiovascular: Normal rate and regular rhythm.   No murmur heard.  Pulses:       Carotid pulses are 2+ on the right side, and 2+ on the left side.       Radial pulses are 2+ on the right side, and 2+ on the left side.        Dorsalis pedis pulses are 2+ on the right side, and 2+ on the left side.   Pulmonary/Chest: Effort normal and breath sounds normal.   Abdominal: Soft. He exhibits no distension and no mass. There is no tenderness.   Musculoskeletal: He exhibits no deformity.   Gait normal.    Neurological: He is alert and oriented to person, place, and time. He has normal strength.   Skin: Skin is warm and dry. No cyanosis or erythema. No pallor.   No venous staining   Psychiatric: He has a normal mood and affect. His speech is normal. Judgment and thought content normal.     Results for REINA PARNELL (MRN [id number]) as of 4/15/2019 15:39  GFR 8 Ref. Range 11/21/2018 05:57   Creatinine Latest Ref Range: 0.70 - 1.30 mg/dL 8.65 (H)   BUN Latest Ref Range: 7 - 21 mg/dL 72 (H)     ASSESSMENT:  Reina was seen today for hemodialysis access.    Diagnoses and all orders for this visit:    ESRD (end stage renal disease) on dialysis (CMS/[facility])  -     Case Request; Standing  -     sodium chloride 0.9 % infusion  -     ceFAZolin (ANCEF) 2 g in sodium chloride 0.9 % 100 mL IVPB  -     Case Request    Essential hypertension    Coronary artery disease involving native coronary artery of native heart without angina pectoris    Type 2 diabetes mellitus with diabetic polyneuropathy, with long-term current use of insulin (CMS/[facility])    Other orders  -     Provide NPO Instructions to Patient; Future  -     Obtain  informed consent; Standing  -     Place sequential compression device; Standing  -     Potassium; Standing    PLAN:  Detailed discussion with Jamil Olivo regarding situation and options.  ESRD on hemodialysis.  Long term AV access for dialysis is advisable.  vein mapping shows adequate vein for fistula.  Multiple risk factors with severe comorbidities    Risks including but not limited to infection, bleeding, blood vessel and nerve injury, swelling, reduced circulation to distal extremity, need for revision and repeat intervention to maintain access.  Benefits:  avoidance of catheter, long term access for dialysis.  Options:  catheter, fistula vs graft, peritoneal dialysis, renal transplantation.  Understands and wishes to proceed.    LEFT radial artery to cephalic vein AV fistula  vascular ultrasound.  [facility].  SDS.  4/24/2019    Return after above studies complete  Obesity Class  0. Increased risk cardiovascular events, sleep and breathing disorders, joint issues, type 2 diabetes mellitus. Options for weight management, heart healthy diet, exercise programs, and associated health risks of obesity discussed.  Recommended regular physical activity, progressive walking program.  Continue current medications as directed.    Thank you for the opportunity to participate in this patient's care.    Copy to primary care provider.    EMR Dragon/Transcription disclaimer:   Much of this encounter note is an electronic transcription/translation of spoken language to printed text. The electronic translation of spoken language may permit erroneous, or at times, nonsensical words or phrases to be inadvertently transcribed; Although I have reviewed the note for such errors, some may still exist.

## 2019-04-15 NOTE — H&P (VIEW-ONLY)
4/15/2019    Jamil Olivo  1965    Chief Complaint:    Chief Complaint   Patient presents with   • Hemodialysis Access       HPI:      PCP:  John Hyde MD  Cardiology:    Nephrology:      53 y.o. male with HTN(stable, increased risk stroke, rupture), Hyperlipidemia(stable, increased risk cardiovascular events), Diabetes Mellitus(stable, increased risk cardiovascular events) and Chronic Kidney Disease(stable, increased risk renal failure) .  never smoked.  ESRD on hemodialysis since 11/2018 via tunnel catheter.  Requires long term AV access for hemodialysis.  No other associated signs, symptoms or modifying factors.    4/2019 Upper extremity vein mapping:  RIGHT cephalic 1.1-4.3mm, basilic 1.4-2.6mm.  LEFT cephalic 2.6-5.2mm.  Distal radial 2.0mm.  High bifurcation brachial artery prox mid humerus.    3/2017 Echocardiogram:  EF 15% LA 54mm, LV 70mm, RVSP 52mmHg.  Mild TR.  Mild-moderate MR.  8/2017 Stress Test:  EF 20%, moderate to severe inferolateral basal ischemia.  Moderate inferior scar.  11/2018 Cardiac Catheterization:  LAD mild irregularities, %, %.  EF 20%.  11/2018 ICD placement (LEFT SCV)    The following portions of the patient's history were reviewed and updated as appropriate: allergies, current medications, past family history, past medical history, past social history, past surgical history and problem list.  Recent images independently reviewed.  Available laboratory values reviewed.    PMH:  Past Medical History:   Diagnosis Date   • Asthma    • CAD (coronary artery disease) 11/19/2018   • CHF (congestive heart failure) (CMS/[facility])    • Diabetes mellitus (CMS/[facility])    • Diabetic neuropathy (CMS/HCC) 2014   • Hypertension    • Kidney disease 2018    pt states that he is close to needing to be on dialysis     Family History   Problem Relation Age of Onset   • Hypertension Mother    • Diabetes Mother    • Hypertension Father    • Diabetes Father    • Asthma Maternal  Grandfather      Social History     Tobacco Use   • Smoking status: Never Smoker   • Smokeless tobacco: Never Used   Substance Use Topics   • Alcohol use: No   • Drug use: No       ALLERGIES:  Allergies   Allergen Reactions   • Motrin [Ibuprofen] Hives         MEDICATIONS:    Current Outpatient Medications:   •  albuterol (PROVENTIL HFA;VENTOLIN HFA) 108 (90 BASE) MCG/ACT inhaler, Inhale 2 puffs Every 4 (Four) Hours As Needed for Wheezing., Disp: 1 inhaler, Rfl: 1  •  Cholecalciferol (VITAMIN D3) 70874 units capsule, Take 1 capsule by mouth Every 7 (Seven) Days., Disp: 4 capsule, Rfl: 1  •  clopidogrel (PLAVIX) 75 MG tablet, Take 1 tablet by mouth Daily., Disp: 30 tablet, Rfl: 1  •  cyclobenzaprine (FLEXERIL) 5 MG tablet, Take 1 tablet by mouth 3 (Three) Times a Day As Needed for Muscle Spasms (for pain)., Disp: 90 tablet, Rfl: 1  •  doxercalciferol (HECTOROL) 4 MCG/2ML injection, Infuse 1 mL into a venous catheter 3 (Three) Times a Week., Disp: , Rfl:   •  hydrALAZINE (APRESOLINE) 50 MG tablet, Take 75 mg by mouth 3 (Three) Times a Day., Disp: , Rfl:   •  insulin glargine (LANTUS) 100 UNIT/ML injection, Inject  under the skin into the appropriate area as directed Daily., Disp: , Rfl:   •  labetalol (NORMODYNE) 100 MG tablet, Take 1 tablet by mouth 2 (Two) Times a Day., Disp: 60 tablet, Rfl: 1  •  lisinopril (PRINIVIL,ZESTRIL) 10 MG tablet, Take 1 tablet by mouth Every Night., Disp: 30 tablet, Rfl: 1  •  sevelamer (RENAGEL) 800 MG tablet, Take 2 tablets by mouth 3 (Three) Times a Day With Meals., Disp: 180 tablet, Rfl: 1    Review of Systems   Review of Systems   Constitution: Positive for malaise/fatigue. Negative for weakness and weight loss.   Cardiovascular: Negative for chest pain, claudication and dyspnea on exertion.   Respiratory: Negative for cough and shortness of breath.    Skin: Negative for color change and poor wound healing.   Neurological: Negative for dizziness and numbness.       Physical Exam    Physical Exam   Constitutional: He is oriented to person, place, and time. He is active and cooperative. He does not appear ill. No distress.   HENT:   Right Ear: Hearing normal.   Left Ear: Hearing normal.   Nose: No nasal deformity. No epistaxis.   Mouth/Throat: He does not have dentures. Normal dentition.   Cardiovascular: Normal rate and regular rhythm.   No murmur heard.  Pulses:       Carotid pulses are 2+ on the right side, and 2+ on the left side.       Radial pulses are 2+ on the right side, and 2+ on the left side.        Dorsalis pedis pulses are 2+ on the right side, and 2+ on the left side.   Pulmonary/Chest: Effort normal and breath sounds normal.   Abdominal: Soft. He exhibits no distension and no mass. There is no tenderness.   Musculoskeletal: He exhibits no deformity.   Gait normal.    Neurological: He is alert and oriented to person, place, and time. He has normal strength.   Skin: Skin is warm and dry. No cyanosis or erythema. No pallor.   No venous staining   Psychiatric: He has a normal mood and affect. His speech is normal. Judgment and thought content normal.     Results for REINA PARNELL (MRN [id number]) as of 4/15/2019 15:39  GFR 8 Ref. Range 11/21/2018 05:57   Creatinine Latest Ref Range: 0.70 - 1.30 mg/dL 8.65 (H)   BUN Latest Ref Range: 7 - 21 mg/dL 72 (H)     ASSESSMENT:  Reina was seen today for hemodialysis access.    Diagnoses and all orders for this visit:    ESRD (end stage renal disease) on dialysis (CMS/[facility])  -     Case Request; Standing  -     sodium chloride 0.9 % infusion  -     ceFAZolin (ANCEF) 2 g in sodium chloride 0.9 % 100 mL IVPB  -     Case Request    Essential hypertension    Coronary artery disease involving native coronary artery of native heart without angina pectoris    Type 2 diabetes mellitus with diabetic polyneuropathy, with long-term current use of insulin (CMS/[facility])    Other orders  -     Provide NPO Instructions to Patient; Future  -     Obtain  informed consent; Standing  -     Place sequential compression device; Standing  -     Potassium; Standing    PLAN:  Detailed discussion with Jamil Olivo regarding situation and options.  ESRD on hemodialysis.  Long term AV access for dialysis is advisable.  vein mapping shows adequate vein for fistula.  Multiple risk factors with severe comorbidities    Risks including but not limited to infection, bleeding, blood vessel and nerve injury, swelling, reduced circulation to distal extremity, need for revision and repeat intervention to maintain access.  Benefits:  avoidance of catheter, long term access for dialysis.  Options:  catheter, fistula vs graft, peritoneal dialysis, renal transplantation.  Understands and wishes to proceed.    LEFT radial artery to cephalic vein AV fistula  vascular ultrasound.  Regional/GEN.  SDS.  4/24/2019    Return after above studies complete  Obesity Class  0. Increased risk cardiovascular events, sleep and breathing disorders, joint issues, type 2 diabetes mellitus. Options for weight management, heart healthy diet, exercise programs, and associated health risks of obesity discussed.  Recommended regular physical activity, progressive walking program.  Continue current medications as directed.    Thank you for the opportunity to participate in this patient's care.    Copy to primary care provider.    EMR Dragon/Transcription disclaimer:   Much of this encounter note is an electronic transcription/translation of spoken language to printed text. The electronic translation of spoken language may permit erroneous, or at times, nonsensical words or phrases to be inadvertently transcribed; Although I have reviewed the note for such errors, some may still exist.

## 2019-04-24 ENCOUNTER — ANESTHESIA EVENT (OUTPATIENT)
Dept: PERIOP | Facility: HOSPITAL | Age: 54
End: 2019-04-24

## 2019-04-24 ENCOUNTER — HOSPITAL ENCOUNTER (OUTPATIENT)
Facility: HOSPITAL | Age: 54
Setting detail: HOSPITAL OUTPATIENT SURGERY
Discharge: HOME | End: 2019-04-24
Attending: THORACIC SURGERY (CARDIOTHORACIC VASCULAR SURGERY) | Admitting: THORACIC SURGERY (CARDIOTHORACIC VASCULAR SURGERY)

## 2019-04-24 ENCOUNTER — ANESTHESIA (OUTPATIENT)
Dept: PERIOP | Facility: HOSPITAL | Age: 54
End: 2019-04-24

## 2019-04-24 VITALS
WEIGHT: 212.96 LBS | BODY MASS INDEX: 29.81 KG/M2 | RESPIRATION RATE: 18 BRPM | HEART RATE: 85 BPM | OXYGEN SATURATION: 96 % | HEIGHT: 71 IN | TEMPERATURE: 97.3 F | SYSTOLIC BLOOD PRESSURE: 186 MMHG | DIASTOLIC BLOOD PRESSURE: 84 MMHG

## 2019-04-24 DIAGNOSIS — Z99.2 ESRD (END STAGE RENAL DISEASE) ON DIALYSIS (HCC): ICD-10-CM

## 2019-04-24 DIAGNOSIS — N18.6 ESRD (END STAGE RENAL DISEASE) ON DIALYSIS (HCC): ICD-10-CM

## 2019-04-24 LAB
ANION GAP SERPL CALCULATED.3IONS-SCNC: 19 MMOL/L
BUN BLD-MCNC: 38 MG/DL (ref 6–20)
BUN/CREAT SERPL: 6.8 (ref 7–25)
CALCIUM SPEC-SCNC: 9.1 MG/DL (ref 8.6–10.5)
CHLORIDE SERPL-SCNC: 98 MMOL/L (ref 98–107)
CO2 SERPL-SCNC: 23 MMOL/L (ref 22–29)
CREAT BLD-MCNC: 5.57 MG/DL (ref 0.76–1.27)
GFR SERPL CREATININE-BSD FRML MDRD: 13 ML/MIN/1.73
GFR SERPL CREATININE-BSD FRML MDRD: ABNORMAL ML/MIN/1.73
GLUCOSE BLD-MCNC: 89 MG/DL (ref 65–99)
GLUCOSE BLDC GLUCOMTR-MCNC: 89 MG/DL (ref 70–130)
POTASSIUM BLD-SCNC: 4.3 MMOL/L (ref 3.5–5.2)
POTASSIUM BLD-SCNC: 4.3 MMOL/L (ref 3.5–5.2)
SODIUM BLD-SCNC: 140 MMOL/L (ref 136–145)

## 2019-04-24 PROCEDURE — 25010000002 MIDAZOLAM PER 1 MG: Performed by: NURSE ANESTHETIST, CERTIFIED REGISTERED

## 2019-04-24 PROCEDURE — 25010000002 PROPOFOL 10 MG/ML EMULSION: Performed by: NURSE ANESTHETIST, CERTIFIED REGISTERED

## 2019-04-24 PROCEDURE — 25010000002 HEPARIN (PORCINE) PER 1000 UNITS: Performed by: THORACIC SURGERY (CARDIOTHORACIC VASCULAR SURGERY)

## 2019-04-24 PROCEDURE — 82962 GLUCOSE BLOOD TEST: CPT

## 2019-04-24 PROCEDURE — 25010000002 ROPIVACAINE PER 1 MG: Performed by: NURSE ANESTHETIST, CERTIFIED REGISTERED

## 2019-04-24 PROCEDURE — 80048 BASIC METABOLIC PNL TOTAL CA: CPT | Performed by: ANESTHESIOLOGY

## 2019-04-24 PROCEDURE — 36820 AV FUSION/FOREARM VEIN: CPT | Performed by: THORACIC SURGERY (CARDIOTHORACIC VASCULAR SURGERY)

## 2019-04-24 PROCEDURE — 25010000002 PROPOFOL 1000 MG/ML EMULSION: Performed by: NURSE ANESTHETIST, CERTIFIED REGISTERED

## 2019-04-24 PROCEDURE — 25010000002 HEPARIN (PORCINE) PER 1000 UNITS: Performed by: NURSE ANESTHETIST, CERTIFIED REGISTERED

## 2019-04-24 PROCEDURE — 84132 ASSAY OF SERUM POTASSIUM: CPT | Performed by: THORACIC SURGERY (CARDIOTHORACIC VASCULAR SURGERY)

## 2019-04-24 PROCEDURE — 25010000002 PROTAMINE SULFATE PER 10 MG: Performed by: NURSE ANESTHETIST, CERTIFIED REGISTERED

## 2019-04-24 RX ORDER — HEPARIN SODIUM 5000 [USP'U]/ML
INJECTION, SOLUTION INTRAVENOUS; SUBCUTANEOUS AS NEEDED
Status: DISCONTINUED | OUTPATIENT
Start: 2019-04-24 | End: 2019-04-24 | Stop reason: HOSPADM

## 2019-04-24 RX ORDER — PROPOFOL 10 MG/ML
VIAL (ML) INTRAVENOUS AS NEEDED
Status: DISCONTINUED | OUTPATIENT
Start: 2019-04-24 | End: 2019-04-24 | Stop reason: SURG

## 2019-04-24 RX ORDER — ACETAMINOPHEN 325 MG/1
650 TABLET ORAL ONCE
Status: DISCONTINUED | OUTPATIENT
Start: 2019-04-24 | End: 2019-04-24 | Stop reason: HOSPADM

## 2019-04-24 RX ORDER — HYDROCODONE BITARTRATE AND ACETAMINOPHEN 5; 325 MG/1; MG/1
1 TABLET ORAL ONCE AS NEEDED
Status: DISCONTINUED | OUTPATIENT
Start: 2019-04-24 | End: 2019-04-24 | Stop reason: HOSPADM

## 2019-04-24 RX ORDER — ONDANSETRON 4 MG/1
4 TABLET, FILM COATED ORAL ONCE AS NEEDED
Status: DISCONTINUED | OUTPATIENT
Start: 2019-04-24 | End: 2019-04-24 | Stop reason: HOSPADM

## 2019-04-24 RX ORDER — 0.9 % SODIUM CHLORIDE 0.9 %
VIAL (ML) INJECTION AS NEEDED
Status: DISCONTINUED | OUTPATIENT
Start: 2019-04-24 | End: 2019-04-24 | Stop reason: HOSPADM

## 2019-04-24 RX ORDER — MIDAZOLAM HYDROCHLORIDE 1 MG/ML
INJECTION INTRAMUSCULAR; INTRAVENOUS AS NEEDED
Status: DISCONTINUED | OUTPATIENT
Start: 2019-04-24 | End: 2019-04-24 | Stop reason: SURG

## 2019-04-24 RX ORDER — BUPIVACAINE HYDROCHLORIDE 5 MG/ML
INJECTION, SOLUTION EPIDURAL; INTRACAUDAL AS NEEDED
Status: DISCONTINUED | OUTPATIENT
Start: 2019-04-24 | End: 2019-04-24 | Stop reason: HOSPADM

## 2019-04-24 RX ORDER — HYDROCODONE BITARTRATE AND ACETAMINOPHEN 5; 325 MG/1; MG/1
1 TABLET ORAL EVERY 4 HOURS PRN
Qty: 30 TABLET | Refills: 0 | Status: SHIPPED | OUTPATIENT
Start: 2019-04-24 | End: 2019-11-26

## 2019-04-24 RX ORDER — FAMOTIDINE 10 MG/ML
20 INJECTION, SOLUTION INTRAVENOUS ONCE
Status: DISCONTINUED | OUTPATIENT
Start: 2019-04-24 | End: 2019-04-24 | Stop reason: HOSPADM

## 2019-04-24 RX ORDER — PROTAMINE SULFATE 10 MG/ML
INJECTION, SOLUTION INTRAVENOUS AS NEEDED
Status: DISCONTINUED | OUTPATIENT
Start: 2019-04-24 | End: 2019-04-24 | Stop reason: SURG

## 2019-04-24 RX ORDER — SODIUM CHLORIDE, SODIUM LACTATE, POTASSIUM CHLORIDE, CALCIUM CHLORIDE 600; 310; 30; 20 MG/100ML; MG/100ML; MG/100ML; MG/100ML
9 INJECTION, SOLUTION INTRAVENOUS CONTINUOUS
Status: DISCONTINUED | OUTPATIENT
Start: 2019-04-24 | End: 2019-04-24 | Stop reason: HOSPADM

## 2019-04-24 RX ORDER — HEPARIN SODIUM 1000 [USP'U]/ML
INJECTION, SOLUTION INTRAVENOUS; SUBCUTANEOUS AS NEEDED
Status: DISCONTINUED | OUTPATIENT
Start: 2019-04-24 | End: 2019-04-24 | Stop reason: SURG

## 2019-04-24 RX ORDER — SODIUM CHLORIDE 0.9 % (FLUSH) 0.9 %
1-10 SYRINGE (ML) INJECTION AS NEEDED
Status: DISCONTINUED | OUTPATIENT
Start: 2019-04-24 | End: 2019-04-24 | Stop reason: HOSPADM

## 2019-04-24 RX ORDER — LIDOCAINE HYDROCHLORIDE 10 MG/ML
0.5 INJECTION, SOLUTION EPIDURAL; INFILTRATION; INTRACAUDAL; PERINEURAL ONCE AS NEEDED
Status: DISCONTINUED | OUTPATIENT
Start: 2019-04-24 | End: 2019-04-24 | Stop reason: HOSPADM

## 2019-04-24 RX ORDER — ROPIVACAINE HYDROCHLORIDE 5 MG/ML
INJECTION, SOLUTION EPIDURAL; INFILTRATION; PERINEURAL
Status: COMPLETED | OUTPATIENT
Start: 2019-04-24 | End: 2019-04-24

## 2019-04-24 RX ORDER — LIDOCAINE HYDROCHLORIDE 20 MG/ML
INJECTION, SOLUTION INFILTRATION; PERINEURAL AS NEEDED
Status: DISCONTINUED | OUTPATIENT
Start: 2019-04-24 | End: 2019-04-24 | Stop reason: SURG

## 2019-04-24 RX ORDER — BUPIVACAINE HCL/0.9 % NACL/PF 0.1 %
2 PLASTIC BAG, INJECTION (ML) EPIDURAL ONCE
Status: COMPLETED | OUTPATIENT
Start: 2019-04-24 | End: 2019-04-24

## 2019-04-24 RX ORDER — SODIUM CHLORIDE 9 MG/ML
50 INJECTION, SOLUTION INTRAVENOUS CONTINUOUS
Status: DISCONTINUED | OUTPATIENT
Start: 2019-04-24 | End: 2019-04-24 | Stop reason: HOSPADM

## 2019-04-24 RX ADMIN — PROPOFOL 20 MG: 10 INJECTION, EMULSION INTRAVENOUS at 10:09

## 2019-04-24 RX ADMIN — HEPARIN SODIUM 6000 UNITS: 1000 INJECTION, SOLUTION INTRAVENOUS; SUBCUTANEOUS at 10:51

## 2019-04-24 RX ADMIN — SODIUM CHLORIDE 50 ML/HR: 9 INJECTION, SOLUTION INTRAVENOUS at 07:28

## 2019-04-24 RX ADMIN — MIDAZOLAM HYDROCHLORIDE 2 MG: 2 INJECTION, SOLUTION INTRAMUSCULAR; INTRAVENOUS at 10:06

## 2019-04-24 RX ADMIN — PROPOFOL 30 MCG/KG/MIN: 10 INJECTION, EMULSION INTRAVENOUS at 10:09

## 2019-04-24 RX ADMIN — LIDOCAINE HYDROCHLORIDE 80 MG: 20 INJECTION, SOLUTION INFILTRATION; PERINEURAL at 10:08

## 2019-04-24 RX ADMIN — PROTAMINE SULFATE 20 MG: 10 INJECTION, SOLUTION INTRAVENOUS at 11:32

## 2019-04-24 RX ADMIN — ROPIVACAINE HYDROCHLORIDE 30 ML: 5 INJECTION, SOLUTION EPIDURAL; INFILTRATION; PERINEURAL at 08:55

## 2019-04-24 RX ADMIN — Medication 2 G: at 10:04

## 2019-04-24 NOTE — ANESTHESIA POSTPROCEDURE EVALUATION
Patient: Jamil Olivo    Procedure Summary     Date:  04/24/19 Room / Location:  [facility] OR 05 / [facility] OR    Anesthesia Start:  0959 Anesthesia Stop:  1151    Procedure:  radial artery to cephalic vein ARTERIOVENOUS FISTULA (Left ) Diagnosis:       ESRD (end stage renal disease) on dialysis (CMS/[facility])      (ESRD (end stage renal disease) on dialysis (CMS/[facility]) [N18.6, Z99.2])    Surgeon:  Kip Hoang MD Provider:  Calderon Cuevas MD    Anesthesia Type:  MAC, regional ASA Status:  4          Anesthesia Type: MAC, regional  Last vitals  BP   157/100 (04/24/19 0855)   Temp   97.4 °F (36.3 °C) (04/24/19 0719)   Pulse   84 (04/24/19 0855)   Resp   18 (04/24/19 0719)     SpO2   99 % (04/24/19 0855)     Post Anesthesia Care and Evaluation    Patient location during evaluation: PHASE II  Patient participation: complete - patient participated  Level of consciousness: awake  Pain management: adequate  Airway patency: patent  Anesthetic complications: No anesthetic complications  PONV Status: none  Cardiovascular status: acceptable and hemodynamically stable  Respiratory status: acceptable, nonlabored ventilation, spontaneous ventilation and room air  Hydration status: acceptable

## 2019-04-24 NOTE — ANESTHESIA PROCEDURE NOTES
Peripheral Block      Patient reassessed immediately prior to procedure    Patient location during procedure: OR  Start time: 4/24/2019 8:45 AM  Stop time: 4/24/2019 8:55 AM  Reason for block: at surgeon's request and post-op pain management  Performed by  CRNA: Harsha Charles CRNA  Preanesthetic Checklist  Completed: patient identified, site marked, surgical consent, pre-op evaluation, timeout performed, IV checked, risks and benefits discussed and monitors and equipment checked  Prep:  Pt Position: supine  Sterile barriers:cap and mask  Prep: ChloraPrep  Patient monitoring: blood pressure monitoring, continuous pulse oximetry and EKG  Procedure  Sedation:no  Performed under: local infiltration  Guidance:ultrasound guided  ULTRASOUND INTERPRETATION.  Using ultrasound guidance a 21 G gauge needle was placed in close proximity to the brachial plexus nerve, at which point, under ultrasound guidance anesthetic was injected in the area of the nerve and spread of the anesthesia was seen on ultrasound in close proximity thereto.  There were no abnormalities seen on ultrasound; a digital image was taken; and the patient tolerated the procedure with no complications. Images:still images obtained    Laterality:left  Block Type:supraclavicular  Injection Technique:single-shot  Needle Type:echogenic  Needle Gauge:21 G    Medications Used: ropivacaine (NAROPIN) 0.5 % injection, 30 mL  Med admintered at 4/24/2019 8:55 AM  Post Assessment  Injection Assessment: negative aspiration for heme, no paresthesia on injection and incremental injection  Patient Tolerance:comfortable throughout block  Complications:no  Additional Notes  Ultrasound interpretation:  Needle seen in adductor canal on ultrasound.  Local seen spreading.  No abnormalities noted.

## 2019-04-24 NOTE — OP NOTE
OPERATIVE NOTE  Jamil Olivo  1965 4/24/2019    PREOP DIAGNOSES:  ESRD (end stage renal disease) on dialysis (CMS/[facility]) [N18.6, Z99.2]    POSTOP DIAGNOSES:  ESRD (end stage renal disease) on dialysis (CMS/HCC) [N18.6, Z99.2]    PROCEDURE:  radial artery to cephalic vein ARTERIOVENOUS FISTULA  Venous duplex unilateral extremity limited    SURGEON: ALY Hoang MD FACS RPVI    ASSISTANT: Hunter Moreno CFA    ANESTHESIA: MAC local IV sedation and Regional block  General with Block    ESTIMATED BLOOD LOSS: minimal     COMPLICATIONS: None    DESCRIPTION OF OPERATION:   Patient taken to the operating room and placed in supine position, anesthesia was induced.  Prepped and draped in sterile fashion.  A vascular ultrasound was used to identify the cephalic vein in the forearm which was 5mm in diameter and patent, radial artery 3mm patent.  Incision was made in the distal forearm, dissection carried down to the cephalic vein which was isolated.  Radial artery was identified and isolated.  6000 units of intravenous heparin were given.  Cephalic vein was isolated distally, ligated with medium clips and divided.  Side branches distal forearm ligated and divided clips to body, 7-0 prolene on vein. Radial artery and branches were isolated with bulldog clamps.  Distal vein was fashioned for anastomosis, radial artery was opened with a supersharp and Pino scissors.  Cephalic vein anastomosed end-to-side to the radial artery using 7-0 Prolene.  Usual debubbling techniques were employed.  Excellent thrill in the fistula at the completion of the case, good biphasic signal throughout the vein with Doppler.  Good radial ulnar pulse.  Hemostasis was obtained. snow was used. Incision closed in layers of 3-0 PDS and the subcutaneous tissue, 4 Monocryl in the skin with Dermabond tape.  Patient tolerated procedure well, transferred to PACU in stable condition.              This document has been electronically signed  by Kip Hoang MD on April 24, 2019 11:37 AM

## 2019-04-24 NOTE — ANESTHESIA PREPROCEDURE EVALUATION
Anesthesia Evaluation     Patient summary reviewed and Nursing notes reviewed   NPO Solid Status: > 8 hours  NPO Liquid Status: > 2 hours           Airway   Mallampati: II  TM distance: >3 FB  Neck ROM: full  Dental - normal exam     Pulmonary - normal exam    breath sounds clear to auscultation  (+) asthma,   Cardiovascular - normal exam    Rhythm: regular  Rate: normal    (+) hypertension, CAD, CHF (Ejection fraction of 20%),   (-) angina, orthopnea, PND, NEW      Neuro/Psych  (+) numbness,     GI/Hepatic/Renal/Endo    (+)   renal disease ESRD and stones, diabetes mellitus using insulin,     Musculoskeletal (-) negative ROS    Abdominal    Substance History - negative use     OB/GYN negative ob/gyn ROS         Other - negative ROS                       Anesthesia Plan    ASA 4     general   (Supraclavicular block for PO pain)  intravenous induction   Anesthetic plan, all risks, benefits, and alternatives have been provided, discussed and informed consent has been obtained with: patient.

## 2019-05-01 ENCOUNTER — OFFICE VISIT (OUTPATIENT)
Dept: CARDIAC SURGERY | Facility: CLINIC | Age: 54
End: 2019-05-01

## 2019-05-01 VITALS
TEMPERATURE: 98.3 F | DIASTOLIC BLOOD PRESSURE: 90 MMHG | BODY MASS INDEX: 29.26 KG/M2 | WEIGHT: 209 LBS | HEIGHT: 71 IN | SYSTOLIC BLOOD PRESSURE: 160 MMHG | HEART RATE: 100 BPM | OXYGEN SATURATION: 98 %

## 2019-05-01 DIAGNOSIS — N18.6 ESRD (END STAGE RENAL DISEASE) ON DIALYSIS (HCC): ICD-10-CM

## 2019-05-01 DIAGNOSIS — Z99.2 ESRD (END STAGE RENAL DISEASE) ON DIALYSIS (HCC): ICD-10-CM

## 2019-05-01 DIAGNOSIS — I77.0 A-V FISTULA (HCC): ICD-10-CM

## 2019-05-01 DIAGNOSIS — Z09 FOLLOW-UP SURGERY CARE: Primary | ICD-10-CM

## 2019-05-01 PROCEDURE — 99024 POSTOP FOLLOW-UP VISIT: CPT | Performed by: NURSE PRACTITIONER

## 2019-05-01 NOTE — PATIENT INSTRUCTIONS
Stable Post Op LEFT fistula placement  Healing well  +thrill (check daily)  Signs and symptoms of infection including drainage from operative site, redness, swelling, with associated fever and/or chills notify Heart and Vascular center immediately for wound check.  Clean operative site with antibacterial soap/water, pat dry. Keep open to air unless draining, then may apply dry dressing.  No ointments or creams unless prescribed by provider.   Return 6 weeks- Duplex

## 2019-05-09 NOTE — PROGRESS NOTES
CVTS Office Progress Note       Subjective   Patient ID: Jamil Olivo is a 53 y.o. male is here today for follow-up.    Chief Complaint:    Chief Complaint   Patient presents with   • Hemodialysis Access     (4/24/19) Left AV fistula   • Wound Check       The following portions of the patient's history were reviewed and updated as appropriate: allergies, current medications, past family history, past medical history, past social history, past surgical history and problem list.  Recent images independently reviewed.  Available laboratory values reviewed.    PCP:  John Hyde MD  Cardiology:  Kd  Nephrology: [facility] T,Th,    53 y.o. male with HTN(stable, increased risk stroke, rupture), Hyperlipidemia(stable, increased risk cardiovascular events), Diabetes Mellitus(stable, increased risk cardiovascular events) and Chronic Kidney Disease(stable, increased risk renal failure) .  never smoked.  ESRD on hemodialysis since 11/2018 via tunnel catheter.  Requires long term AV access for hemodialysis.  No other associated signs, symptoms or modifying factors.    4/2019 Upper extremity vein mapping:  RIGHT cephalic 1.1-4.3mm, basilic 1.4-2.6mm.  LEFT cephalic 2.6-5.2mm.  Distal radial 2.0mm.  High bifurcation brachial artery prox mid humerus.    3/2017 Echocardiogram:  EF 15% LA 54mm, LV 70mm, RVSP 52mmHg.  Mild TR.  Mild-moderate MR.  8/2017 Stress Test:  EF 20%, moderate to severe inferolateral basal ischemia.  Moderate inferior scar.  11/2018 Cardiac Catheterization:  LAD mild irregularities, %, %.  EF 20%.  11/2018 ICD placement (LEFT SCV)  11/19/18: Place tunnelled central venous catheter   4/24/19: LEFT Radial-Cephalic AV Fistula Placement      Past Medical History:   Diagnosis Date   • Asthma    • CAD (coronary artery disease) 11/19/2018   • CHF (congestive heart failure) (CMS/[facility])    • Diabetes mellitus (CMS/[facility])    • Diabetic neuropathy (CMS/[facility]) 2014   • Dialysis patient  (CMS/[facility])    • Hypertension    • Kidney disease 2018    pt states that he is close to needing to be on dialysis     Past Surgical History:   Procedure Laterality Date   • ARTERIOVENOUS FISTULA/SHUNT SURGERY Left 4/24/2019    Procedure: radial artery to cephalic vein ARTERIOVENOUS FISTULA;  Surgeon: Kip Hoang MD;  Location: [facility] OR;  Service: Vascular   • CARDIAC CATHETERIZATION N/A 11/16/2018    Procedure: Left Heart Cath;  Surgeon: Robin Yi MD;  Location: [facility] CATH INVASIVE LOCATION;  Service: Cardiology   • CARDIAC ELECTROPHYSIOLOGY PROCEDURE N/A 11/20/2018    Procedure: ICD SC new;  Surgeon: Garcia Haque MD;  Location: [facility] CATH INVASIVE LOCATION;  Service: Cardiology   • EYE SURGERY Bilateral 12/2017   • INTERVENTIONAL RADIOLOGY PROCEDURE N/A 11/19/2018    Procedure: tunneled central venous catheter placement;  Surgeon: Kip Hoang MD;  Location: [facility] ANGIO INVASIVE LOCATION;  Service: Interventional Radiology   • PACEMAKER IMPLANTATION       Family History   Problem Relation Age of Onset   • Hypertension Mother    • Diabetes Mother    • Hypertension Father    • Diabetes Father    • Asthma Maternal Grandfather      Social History     Tobacco Use   • Smoking status: Never Smoker   • Smokeless tobacco: Never Used   Substance Use Topics   • Alcohol use: No   • Drug use: No       ALLERGIES:   Motrin [ibuprofen]    MEDICATIONS:    Current Outpatient Medications:   •  albuterol (PROVENTIL HFA;VENTOLIN HFA) 108 (90 BASE) MCG/ACT inhaler, Inhale 2 puffs Every 4 (Four) Hours As Needed for Wheezing., Disp: 1 inhaler, Rfl: 1  •  Cholecalciferol (VITAMIN D3) 93802 units capsule, Take 1 capsule by mouth Every 7 (Seven) Days., Disp: 4 capsule, Rfl: 1  •  clopidogrel (PLAVIX) 75 MG tablet, Take 1 tablet by mouth Daily., Disp: 30 tablet, Rfl: 1  •  cyclobenzaprine (FLEXERIL) 5 MG tablet, Take 1 tablet by mouth 3 (Three) Times a Day As Needed for Muscle Spasms (for pain)., Disp: 90  tablet, Rfl: 1  •  doxercalciferol (HECTOROL) 4 MCG/2ML injection, Infuse 1 mL into a venous catheter 3 (Three) Times a Week., Disp: , Rfl:   •  hydrALAZINE (APRESOLINE) 50 MG tablet, Take 75 mg by mouth 3 (Three) Times a Day., Disp: , Rfl:   •  HYDROcodone-acetaminophen (NORCO) 5-325 MG per tablet, Take 1 tablet by mouth Every 4 (Four) Hours As Needed (Pain)., Disp: 30 tablet, Rfl: 0  •  insulin glargine (LANTUS) 100 UNIT/ML injection, Inject 30 Units under the skin into the appropriate area as directed Every Night., Disp: , Rfl:   •  labetalol (NORMODYNE) 100 MG tablet, Take 1 tablet by mouth 2 (Two) Times a Day., Disp: 60 tablet, Rfl: 1  •  lisinopril (PRINIVIL,ZESTRIL) 10 MG tablet, Take 1 tablet by mouth Every Night., Disp: 30 tablet, Rfl: 1  •  sevelamer (RENAGEL) 800 MG tablet, Take 2 tablets by mouth 3 (Three) Times a Day With Meals., Disp: 180 tablet, Rfl: 1    Review of Systems   Cardiovascular:        Fistula bleeding (N). Fistula Pain (N). Extremity Pain (N). Extremity Discoloration (N)     Hematologic/Lymphatic: Negative for bleeding problem.   Skin: Negative for color change and nail changes.   Neurological: Negative for numbness and paresthesias.   All other systems reviewed and are negative.       Objective       Vitals:    05/01/19 1445   BP: 160/90   Pulse: 100   Temp: 98.3 °F (36.8 °C)   SpO2: 98%      Body mass index is 29.15 kg/m².  Physical Exam   Constitutional: He is oriented to person, place, and time. He appears well-nourished.   HENT:   Head: Atraumatic.   Eyes: EOM are normal.   Cardiovascular: Normal rate.   Pulses:       Radial pulses are 2+ on the left side.   Fistula Present LEFT Extremity: (+)thrill/(+)bruit/(+)pulse. Aneurysmal (N). Water Hammer Pulse (N). Thinning (N).  Hilario's Test <3sec (Y).  Skin warm/dry/pink/intact (Y). Radial Pulse (Y).     Pulmonary/Chest: Effort normal and breath sounds normal.   Abdominal: Soft.   Musculoskeletal: Normal range of motion.   Gait normal    Neurological: He is alert and oriented to person, place, and time. No cranial nerve deficit.   Skin: Skin is warm and dry. Capillary refill takes less than 2 seconds.   No venous staining   Psychiatric: He has a normal mood and affect. Thought content normal.   Vitals reviewed.        Assessment & Plan     Independent Review of Radiographic Studies:  Detailed discussion regarding risks, benefits, and treatment plan. Images independently reviewed. Patient understands, agrees, and wishes to proceed with plan.     1. Follow-up surgery care  Stable Post Op LEFT fistula placement  Healing well  +thrill (check daily)  Signs and symptoms of infection including drainage from operative site, redness, swelling, with associated fever and/or chills notify Heart and Vascular center immediately for wound check.  Clean operative site with antibacterial soap/water, pat dry. Keep open to air unless draining, then may apply dry dressing.  No ointments or creams unless prescribed by provider.   Return 6 weeks- Duplex      2. ESRD (end stage renal disease) on dialysis (CMS/[facility])  Continue dialysis as scheduled per tunnel cath. If problems should arise including difficulty with access, high pressure alarms, or inability to complete dialysis please notify [facility] immediately for evaluation.     3. A-V fistula (CMS/[facility])  Allow 8-12 weeks to mature fully prior to cannulation  - Duplex Hemodialysis Access CAR; Future            This document has been electronically signed by NICK Bellamy on May 9, 2019 1:37 PM

## 2019-06-10 NOTE — PROGRESS NOTES
CVTS Office Progress Note       Subjective   Patient ID: Jamil Olivo is a 53 y.o. male is here today for follow-up.    Chief Complaint:    Chief Complaint   Patient presents with   • Hemodialysis Access     follow up        The following portions of the patient's history were reviewed and updated as appropriate: allergies, current medications, past family history, past medical history, past social history, past surgical history and problem list.  Recent images independently reviewed.  Available laboratory values reviewed.    PCP:  John Hyde MD  Cardiology:  Kd  Nephrology: [facility] T,Th,Sa    53 y.o. male with HTN(stable, increased risk stroke, rupture), Hyperlipidemia(stable, increased risk cardiovascular events), Diabetes Mellitus(stable, increased risk cardiovascular events) and Chronic Kidney Disease(stable, increased risk renal failure) .  never smoked.  ESRD on hemodialysis since 11/2018 via tunnel catheter.  Requires long term AV access for hemodialysis.  No other associated signs, symptoms or modifying factors.    4/2019 Upper extremity vein mapping:  RIGHT cephalic 1.1-4.3mm, basilic 1.4-2.6mm.  LEFT cephalic 2.6-5.2mm.  Distal radial 2.0mm.  High bifurcation brachial artery prox mid humerus.    3/2017 Echocardiogram:  EF 15% LA 54mm, LV 70mm, RVSP 52mmHg.  Mild TR.  Mild-moderate MR.  8/2017 Stress Test:  EF 20%, moderate to severe inferolateral basal ischemia.  Moderate inferior scar.  11/2018 Cardiac Catheterization:  LAD mild irregularities, %, %.  EF 20%.  11/2018 ICD placement (LEFT SCV)  11/19/18: Place tunnelled central venous catheter   4/24/19: LEFT Radial-Cephalic AV Fistula Placement  6/12/19: LEFT fistula duplex: Patent. mPSV 487cm/s. Size 2.6-7.6mm. Flows 531-2733ml/m    Past Medical History:   Diagnosis Date   • Asthma    • CAD (coronary artery disease) 11/19/2018   • CHF (congestive heart failure) (CMS/[facility])    • Diabetes mellitus (CMS/[facility])    •  Diabetic neuropathy (CMS/[facility]) 2014   • Dialysis patient (CMS/[facility])    • Hypertension    • Kidney disease 2018    pt states that he is close to needing to be on dialysis     Past Surgical History:   Procedure Laterality Date   • ARTERIOVENOUS FISTULA/SHUNT SURGERY Left 4/24/2019    Procedure: radial artery to cephalic vein ARTERIOVENOUS FISTULA;  Surgeon: Kip Hoang MD;  Location: [facility] OR;  Service: Vascular   • CARDIAC CATHETERIZATION N/A 11/16/2018    Procedure: Left Heart Cath;  Surgeon: Robin Yi MD;  Location: [facility] CATH INVASIVE LOCATION;  Service: Cardiology   • CARDIAC ELECTROPHYSIOLOGY PROCEDURE N/A 11/20/2018    Procedure: ICD SC new;  Surgeon: Garcia Haque MD;  Location: [facility] CATH INVASIVE LOCATION;  Service: Cardiology   • EYE SURGERY Bilateral 12/2017   • INTERVENTIONAL RADIOLOGY PROCEDURE N/A 11/19/2018    Procedure: tunneled central venous catheter placement;  Surgeon: Kip Hoang MD;  Location: [facility] ANGIO INVASIVE LOCATION;  Service: Interventional Radiology   • PACEMAKER IMPLANTATION       Family History   Problem Relation Age of Onset   • Hypertension Mother    • Diabetes Mother    • Hypertension Father    • Diabetes Father    • Asthma Maternal Grandfather      Social History     Tobacco Use   • Smoking status: Never Smoker   • Smokeless tobacco: Never Used   Substance Use Topics   • Alcohol use: No   • Drug use: No       ALLERGIES:   Motrin [ibuprofen]    MEDICATIONS:    Current Outpatient Medications:   •  albuterol (PROVENTIL HFA;VENTOLIN HFA) 108 (90 BASE) MCG/ACT inhaler, Inhale 2 puffs Every 4 (Four) Hours As Needed for Wheezing., Disp: 1 inhaler, Rfl: 1  •  Cholecalciferol (VITAMIN D3) 84488 units capsule, Take 1 capsule by mouth Every 7 (Seven) Days., Disp: 4 capsule, Rfl: 1  •  clopidogrel (PLAVIX) 75 MG tablet, Take 1 tablet by mouth Daily., Disp: 30 tablet, Rfl: 1  •  cyclobenzaprine (FLEXERIL) 5 MG tablet, Take 1 tablet by mouth 3 (Three) Times a  Day As Needed for Muscle Spasms (for pain)., Disp: 90 tablet, Rfl: 1  •  doxercalciferol (HECTOROL) 4 MCG/2ML injection, Infuse 1 mL into a venous catheter 3 (Three) Times a Week., Disp: , Rfl:   •  hydrALAZINE (APRESOLINE) 50 MG tablet, Take 75 mg by mouth 3 (Three) Times a Day., Disp: , Rfl:   •  HYDROcodone-acetaminophen (NORCO) 5-325 MG per tablet, Take 1 tablet by mouth Every 4 (Four) Hours As Needed (Pain)., Disp: 30 tablet, Rfl: 0  •  insulin glargine (LANTUS) 100 UNIT/ML injection, Inject 30 Units under the skin into the appropriate area as directed Every Night., Disp: , Rfl:   •  labetalol (NORMODYNE) 100 MG tablet, Take 1 tablet by mouth 2 (Two) Times a Day., Disp: 60 tablet, Rfl: 1  •  lisinopril (PRINIVIL,ZESTRIL) 10 MG tablet, Take 1 tablet by mouth Every Night., Disp: 30 tablet, Rfl: 1  •  sevelamer (RENAGEL) 800 MG tablet, Take 2 tablets by mouth 3 (Three) Times a Day With Meals., Disp: 180 tablet, Rfl: 1    Review of Systems   Cardiovascular:        Fistula bleeding (N). Fistula Pain (N). Extremity Pain (N). Extremity Discoloration (N)     Hematologic/Lymphatic: Negative for bleeding problem.   Skin: Negative for color change and nail changes.   Neurological: Negative for numbness and paresthesias.   All other systems reviewed and are negative.       Objective   Temp:  [98 °F (36.7 °C)] 98 °F (36.7 °C)  Heart Rate:  [108] 108  BP: (150)/(85) 150/85   Vitals:    06/12/19 1304   BP: 150/85   Pulse: 108   Temp: 98 °F (36.7 °C)   SpO2: 98%      Body mass index is 29.01 kg/m².  Physical Exam   Constitutional: He is oriented to person, place, and time. He appears well-nourished.   HENT:   Head: Atraumatic.   Eyes: EOM are normal.   Cardiovascular: Normal rate.   Pulses:       Radial pulses are 2+ on the left side.   Fistula Present LEFT Extremity: (+)thrill/(+)bruit/(+)pulse. Aneurysmal (N). Water Hammer Pulse (N). Thinning (N).  Hilario's Test <3sec (Y).  Skin warm/dry/pink/intact (Y). Radial Pulse (Y).      Pulmonary/Chest: Effort normal and breath sounds normal.   Abdominal: Soft.   Musculoskeletal: Normal range of motion.   Gait normal   Neurological: He is alert and oriented to person, place, and time. No cranial nerve deficit.   Skin: Skin is warm and dry. Capillary refill takes less than 2 seconds.   No venous staining   Psychiatric: He has a normal mood and affect. Thought content normal.   Vitals reviewed.        Assessment & Plan     Independent Review of Radiographic Studies:  Detailed discussion regarding risks, benefits, and treatment plan. Images independently reviewed. Patient understands, agrees, and wishes to proceed with plan.     1. Follow-up surgery care  Stable Post Op LEFT fistula placement  Healing well  +thrill (check daily)  Signs and symptoms of infection including drainage from operative site, redness, swelling, with associated fever and/or chills notify [facility] immediately for wound check.  Clean operative site with antibacterial soap/water, pat dry. Keep open to air unless draining, then may apply dry dressing.  No ointments or creams unless prescribed by provider.   Return 6 weeks- Duplex      2. ESRD (end stage renal disease) on dialysis (CMS/[facility])  Continue dialysis as scheduled per TC. If problems should arise including difficulty with access, high pressure alarms, or inability to complete dialysis please notify [facility] immediately for evaluation.     3. A-V fistula (CMS/HCC)  Patent LEFT arm fistula  Allow additional time to mature prior to cannulation  - Duplex Hemodialysis Access CAR; Future              This document has been electronically signed by NICK Bellamy on June 12, 2019 1:30 PM

## 2019-06-12 ENCOUNTER — OFFICE VISIT (OUTPATIENT)
Dept: CARDIAC SURGERY | Facility: CLINIC | Age: 54
End: 2019-06-12

## 2019-06-12 VITALS
DIASTOLIC BLOOD PRESSURE: 85 MMHG | SYSTOLIC BLOOD PRESSURE: 150 MMHG | BODY MASS INDEX: 29.12 KG/M2 | HEART RATE: 108 BPM | OXYGEN SATURATION: 98 % | WEIGHT: 208 LBS | HEIGHT: 71 IN | TEMPERATURE: 98 F

## 2019-06-12 DIAGNOSIS — Z09 FOLLOW-UP SURGERY CARE: Primary | ICD-10-CM

## 2019-06-12 DIAGNOSIS — N18.6 ESRD (END STAGE RENAL DISEASE) ON DIALYSIS (HCC): ICD-10-CM

## 2019-06-12 DIAGNOSIS — Z99.2 ESRD (END STAGE RENAL DISEASE) ON DIALYSIS (HCC): ICD-10-CM

## 2019-06-12 DIAGNOSIS — I77.0 A-V FISTULA (HCC): ICD-10-CM

## 2019-06-12 PROCEDURE — 99024 POSTOP FOLLOW-UP VISIT: CPT | Performed by: NURSE PRACTITIONER

## 2019-07-22 ENCOUNTER — OFFICE VISIT (OUTPATIENT)
Dept: CARDIAC SURGERY | Facility: CLINIC | Age: 54
End: 2019-07-22

## 2019-07-22 VITALS
SYSTOLIC BLOOD PRESSURE: 152 MMHG | TEMPERATURE: 98 F | DIASTOLIC BLOOD PRESSURE: 88 MMHG | WEIGHT: 211 LBS | OXYGEN SATURATION: 98 % | HEART RATE: 70 BPM | HEIGHT: 71 IN | BODY MASS INDEX: 29.54 KG/M2

## 2019-07-22 DIAGNOSIS — Z09 FOLLOW-UP SURGERY CARE: ICD-10-CM

## 2019-07-22 DIAGNOSIS — Z99.2 ESRD (END STAGE RENAL DISEASE) ON DIALYSIS (HCC): ICD-10-CM

## 2019-07-22 DIAGNOSIS — N18.6 ESRD (END STAGE RENAL DISEASE) ON DIALYSIS (HCC): ICD-10-CM

## 2019-07-22 DIAGNOSIS — I77.0 A-V FISTULA (HCC): Primary | ICD-10-CM

## 2019-07-22 PROCEDURE — 99024 POSTOP FOLLOW-UP VISIT: CPT | Performed by: NURSE PRACTITIONER

## 2019-07-22 RX ORDER — LIDOCAINE AND PRILOCAINE 25; 25 MG/G; MG/G
CREAM TOPICAL AS NEEDED
Status: SHIPPED | OUTPATIENT
Start: 2019-07-22

## 2019-07-22 RX ORDER — AMIODARONE HYDROCHLORIDE 200 MG/1
100 TABLET ORAL DAILY
Refills: 12 | COMMUNITY
Start: 2019-07-04

## 2019-07-22 RX ORDER — METOPROLOL TARTRATE 50 MG/1
50 TABLET, FILM COATED ORAL 2 TIMES DAILY
Refills: 12 | COMMUNITY
Start: 2019-07-03 | End: 2023-03-31

## 2019-07-22 NOTE — PATIENT INSTRUCTIONS
Stable Post op LEFT Fistula: Strong Thrill/Bruit  Patent fistula per duplex scan  OK to cannulate  Return 2 weeks for tunnel cath removal after successful cannulation and dialysis runs  Follow up: 3 mos LEFT Fistula Duplex

## 2019-07-24 NOTE — PROGRESS NOTES
CVTS Office Progress Note       Subjective   Patient ID: Jamil Olivo is a 53 y.o. male is here today for follow-up.    Chief Complaint:    Chief Complaint   Patient presents with   • Hemodialysis Access       The following portions of the patient's history were reviewed and updated as appropriate: allergies, current medications, past family history, past medical history, past social history, past surgical history and problem list.  Recent images independently reviewed.  Available laboratory values reviewed.    PCP:  John Hyde MD  Cardiology:  Kd  Nephrology: [facility] T,Th,Sa    53 y.o. male with HTN(stable, increased risk stroke, rupture), Hyperlipidemia(stable, increased risk cardiovascular events), Diabetes Mellitus(stable, increased risk cardiovascular events) and Chronic Kidney Disease(stable, increased risk renal failure) .  never smoked.  ESRD on hemodialysis since 11/2018 via tunnel catheter.  Requires long term AV access for hemodialysis.  No other associated signs, symptoms or modifying factors.    4/2019 Upper extremity vein mapping:  RIGHT cephalic 1.1-4.3mm, basilic 1.4-2.6mm.  LEFT cephalic 2.6-5.2mm.  Distal radial 2.0mm.  High bifurcation brachial artery prox mid humerus.    3/2017 Echocardiogram:  EF 15% LA 54mm, LV 70mm, RVSP 52mmHg.  Mild TR.  Mild-moderate MR.  8/2017 Stress Test:  EF 20%, moderate to severe inferolateral basal ischemia.  Moderate inferior scar.  11/2018 Cardiac Catheterization:  LAD mild irregularities, %, %.  EF 20%.  11/2018 ICD placement (LEFT SCV)  11/19/18: Place tunnelled central venous catheter   4/24/19: LEFT Radial-Cephalic AV Fistula Placement  6/12/19: LEFT fistula duplex: Patent. mPSV 487cm/s. Size 2.6-7.6mm. Flows 531-2733ml/m  7/22/19: LEFT fistula duplex: Patent. mPSV  379cms. Size 1.7-6.5mm. Flows 212-1248ml/m.    Past Medical History:   Diagnosis Date   • Asthma    • CAD (coronary artery disease) 11/19/2018   • CHF  (congestive heart failure) (CMS/[facility])    • Diabetes mellitus (CMS/[facility])    • Diabetic neuropathy (CMS/[facility]) 2014   • Dialysis patient (CMS/[facility])    • Hypertension    • Kidney disease 2018    pt states that he is close to needing to be on dialysis     Past Surgical History:   Procedure Laterality Date   • ARTERIOVENOUS FISTULA/SHUNT SURGERY Left 4/24/2019    Procedure: radial artery to cephalic vein ARTERIOVENOUS FISTULA;  Surgeon: Kip Hoang MD;  Location: [facility] OR;  Service: Vascular   • CARDIAC CATHETERIZATION N/A 11/16/2018    Procedure: Left Heart Cath;  Surgeon: Robin Yi MD;  Location: [facility] CATH INVASIVE LOCATION;  Service: Cardiology   • CARDIAC ELECTROPHYSIOLOGY PROCEDURE N/A 11/20/2018    Procedure: ICD SC new;  Surgeon: Garcia Haque MD;  Location: [facility] CATH INVASIVE LOCATION;  Service: Cardiology   • EYE SURGERY Bilateral 12/2017   • INTERVENTIONAL RADIOLOGY PROCEDURE N/A 11/19/2018    Procedure: tunneled central venous catheter placement;  Surgeon: Kip Hoang MD;  Location: [facility] ANGIO INVASIVE LOCATION;  Service: Interventional Radiology   • PACEMAKER IMPLANTATION       Family History   Problem Relation Age of Onset   • Hypertension Mother    • Diabetes Mother    • Hypertension Father    • Diabetes Father    • Asthma Maternal Grandfather      Social History     Tobacco Use   • Smoking status: Never Smoker   • Smokeless tobacco: Never Used   Substance Use Topics   • Alcohol use: No   • Drug use: No       ALLERGIES:   Motrin [ibuprofen]    MEDICATIONS:    Current Outpatient Medications:   •  albuterol (PROVENTIL HFA;VENTOLIN HFA) 108 (90 BASE) MCG/ACT inhaler, Inhale 2 puffs Every 4 (Four) Hours As Needed for Wheezing., Disp: 1 inhaler, Rfl: 1  •  amiodarone (PACERONE) 200 MG tablet, Take 200 mg by mouth 2 (Two) Times a Day., Disp: , Rfl: 12  •  Cholecalciferol (VITAMIN D3) 26733 units capsule, Take 1 capsule by mouth Every 7 (Seven) Days., Disp: 4 capsule, Rfl: 1  •   clopidogrel (PLAVIX) 75 MG tablet, Take 1 tablet by mouth Daily., Disp: 30 tablet, Rfl: 1  •  doxercalciferol (HECTOROL) 4 MCG/2ML injection, Infuse 1 mL into a venous catheter 3 (Three) Times a Week., Disp: , Rfl:   •  hydrALAZINE (APRESOLINE) 50 MG tablet, Take 75 mg by mouth 3 (Three) Times a Day., Disp: , Rfl:   •  HYDROcodone-acetaminophen (NORCO) 5-325 MG per tablet, Take 1 tablet by mouth Every 4 (Four) Hours As Needed (Pain)., Disp: 30 tablet, Rfl: 0  •  insulin glargine (LANTUS) 100 UNIT/ML injection, Inject 30 Units under the skin into the appropriate area as directed Every Night., Disp: , Rfl:   •  lisinopril (PRINIVIL,ZESTRIL) 10 MG tablet, Take 1 tablet by mouth Every Night., Disp: 30 tablet, Rfl: 1  •  metoprolol tartrate (LOPRESSOR) 50 MG tablet, Take 50 mg by mouth 2 (Two) Times a Day., Disp: , Rfl: 12  •  sevelamer (RENAGEL) 800 MG tablet, Take 2 tablets by mouth 3 (Three) Times a Day With Meals., Disp: 180 tablet, Rfl: 1  •  cyclobenzaprine (FLEXERIL) 5 MG tablet, Take 1 tablet by mouth 3 (Three) Times a Day As Needed for Muscle Spasms (for pain)., Disp: 90 tablet, Rfl: 1  •  labetalol (NORMODYNE) 100 MG tablet, Take 1 tablet by mouth 2 (Two) Times a Day., Disp: 60 tablet, Rfl: 1    Current Facility-Administered Medications:   •  lidocaine-prilocaine (EMLA) 2.5-2.5 % cream, , Topical, PRN, Sangeeta, Alyssa Aguilar, APRN    Review of Systems   Cardiovascular:        Fistula bleeding (N). Fistula Pain (N). Extremity Pain (N). Extremity Discoloration (N)     Hematologic/Lymphatic: Negative for bleeding problem.   Skin: Negative for color change and nail changes.   Neurological: Negative for numbness and paresthesias.   All other systems reviewed and are negative.       Objective       Vitals:    07/22/19 1003   BP: 152/88   Pulse: 70   Temp: 98 °F (36.7 °C)   SpO2: 98%      Body mass index is 29.43 kg/m².  Physical Exam   Constitutional: He is oriented to person, place, and time. He appears well-nourished.    HENT:   Head: Atraumatic.   Eyes: EOM are normal.   Cardiovascular: Normal rate.   Pulses:       Radial pulses are 2+ on the left side.   Fistula Present LEFT Extremity: (+)thrill/(+)bruit/(+)pulse. Aneurysmal (N). Water Hammer Pulse (N). Thinning (N).  Hilario's Test <3sec (Y).  Skin warm/dry/pink/intact (Y). Radial Pulse (Y).     Pulmonary/Chest: Effort normal and breath sounds normal.   Abdominal: Soft.   Musculoskeletal: Normal range of motion.   Gait normal   Neurological: He is alert and oriented to person, place, and time. No cranial nerve deficit.   Skin: Skin is warm and dry. Capillary refill takes less than 2 seconds.   No venous staining   Psychiatric: He has a normal mood and affect. Thought content normal.   Vitals reviewed.        Assessment & Plan     Independent Review of Radiographic Studies:  Detailed discussion regarding risks, benefits, and treatment plan. Images independently reviewed. Patient understands, agrees, and wishes to proceed with plan.     1. Follow-up surgery care  Stable Post op LEFT Fistula: Strong Thrill/Bruit  OK to cannulate  Return 2 weeks for tunnel cath removal after successful cannulation and dialysis runs  Follow up: 3 mos LEFT Fistula Duplex  - lidocaine-prilocaine (EMLA) 2.5-2.5 % cream    2. A-V fistula (CMS/[facility]CC)  Patent fistula per duplex scan  - Duplex Hemodialysis Access CAR; Future  - lidocaine-prilocaine (EMLA) 2.5-2.5 % cream    3. ESRD (end stage renal disease) on dialysis (CMS/HCC)  Continue dialysis as scheduled. If problems should arise including difficulty with access, high pressure alarms, or inability to complete dialysis please notify [facility] immediately for evaluation.                 This document has been electronically signed by NICK Bellamy on July 24, 2019 3:16 PM

## 2019-08-09 ENCOUNTER — PROCEDURE VISIT (OUTPATIENT)
Dept: CARDIAC SURGERY | Facility: CLINIC | Age: 54
End: 2019-08-09

## 2019-08-09 VITALS
DIASTOLIC BLOOD PRESSURE: 68 MMHG | OXYGEN SATURATION: 100 % | SYSTOLIC BLOOD PRESSURE: 120 MMHG | HEART RATE: 65 BPM | TEMPERATURE: 97.8 F

## 2019-08-09 DIAGNOSIS — E11.42 TYPE 2 DIABETES MELLITUS WITH DIABETIC POLYNEUROPATHY, WITH LONG-TERM CURRENT USE OF INSULIN (HCC): Chronic | ICD-10-CM

## 2019-08-09 DIAGNOSIS — I25.10 CORONARY ARTERY DISEASE INVOLVING NATIVE CORONARY ARTERY OF NATIVE HEART WITHOUT ANGINA PECTORIS: Chronic | ICD-10-CM

## 2019-08-09 DIAGNOSIS — I10 ESSENTIAL HYPERTENSION: Chronic | ICD-10-CM

## 2019-08-09 DIAGNOSIS — Z99.2 ESRD (END STAGE RENAL DISEASE) ON DIALYSIS (HCC): Primary | ICD-10-CM

## 2019-08-09 DIAGNOSIS — N18.6 ESRD (END STAGE RENAL DISEASE) ON DIALYSIS (HCC): Primary | ICD-10-CM

## 2019-08-09 DIAGNOSIS — Z79.4 TYPE 2 DIABETES MELLITUS WITH DIABETIC POLYNEUROPATHY, WITH LONG-TERM CURRENT USE OF INSULIN (HCC): Chronic | ICD-10-CM

## 2019-08-09 DIAGNOSIS — I77.0 A-V FISTULA (HCC): ICD-10-CM

## 2019-08-09 DIAGNOSIS — I50.23 ACUTE ON CHRONIC SYSTOLIC HEART FAILURE (HCC): Chronic | ICD-10-CM

## 2019-08-09 PROCEDURE — 36589 REMOVAL TUNNELED CV CATH: CPT | Performed by: THORACIC SURGERY (CARDIOTHORACIC VASCULAR SURGERY)

## 2019-08-14 NOTE — PROGRESS NOTES
8/14/2019    Jamil Olivo  1965    Chief Complaint:    Chief Complaint   Patient presents with   • tunneled catheter removal       HPI:      PCP:  John Hyde MD   Cardiology:  Kd  [facility]    53 y.o. male with HTN(stable, increased risk stroke, rupture), Hyperlipidemia(stable, increased risk cardiovascular events), Diabetes Mellitus(stable, increased risk cardiovascular events) and Chronic Kidney Disease(stable, increased risk renal failure) .  never smoked.  ESRD on hemodialysis since 11/2018 via tunnel catheter.  Requires long term AV access for hemodialysis.  No other associated signs, symptoms or modifying factors.    11/2018: Place RIJ tunnelled central venous catheter   4/2019 Upper extremity vein mapping:  RIGHT cephalic 1.1-4.3mm, basilic 1.4-2.6mm.  LEFT cephalic 2.6-5.2mm.  Distal radial 2.0mm.  High bifurcation brachial artery prox mid humerus.  4/2019: LEFT Radial-Cephalic AV Fistula Placement  6/2019: LEFT fistula duplex: Patent. mPSV 487cm/s. Size 2.6-7.6mm. Flows 531-2733ml/m  7/2019: LEFT fistula duplex: Patent. mPSV  379cms. Size 1.7-6.5mm. Flows 212-1248ml/m.  8/2019 tunnel catheter removal    3/2017 Echocardiogram:  EF 15% LA 54mm, LV 70mm, RVSP 52mmHg.  Mild TR.  Mild-moderate MR.  8/2017 Stress Test:  EF 20%, moderate to severe inferolateral basal ischemia.  Moderate inferior scar.  11/2018 Cardiac Catheterization:  LAD mild irregularities, %, %.  EF 20%.  11/2018 ICD placement (LEFT SCV)    The following portions of the patient's history were reviewed and updated as appropriate: allergies, current medications, past family history, past medical history, past social history, past surgical history and problem list.  Recent images independently reviewed.  Available laboratory values reviewed.    PMH:  Past Medical History:   Diagnosis Date   • Asthma    • CAD (coronary artery disease) 11/19/2018   • CHF (congestive heart  failure) (CMS/[facility])    • Diabetes mellitus (CMS/[facility])    • Diabetic neuropathy (CMS/[facility]) 2014   • Dialysis patient (CMS/[facility])    • Hypertension    • Kidney disease 2018    pt states that he is close to needing to be on dialysis     Past Surgical History:   Procedure Laterality Date   • ARTERIOVENOUS FISTULA/SHUNT SURGERY Left 4/24/2019    Procedure: radial artery to cephalic vein ARTERIOVENOUS FISTULA;  Surgeon: Kip Hoang MD;  Location: [facility] OR;  Service: Vascular   • CARDIAC CATHETERIZATION N/A 11/16/2018    Procedure: Left Heart Cath;  Surgeon: Robin Yi MD;  Location: [facility] CATH INVASIVE LOCATION;  Service: Cardiology   • CARDIAC ELECTROPHYSIOLOGY PROCEDURE N/A 11/20/2018    Procedure: ICD SC new;  Surgeon: Garcia Haque MD;  Location: [facility] CATH INVASIVE LOCATION;  Service: Cardiology   • EYE SURGERY Bilateral 12/2017   • INTERVENTIONAL RADIOLOGY PROCEDURE N/A 11/19/2018    Procedure: tunneled central venous catheter placement;  Surgeon: Kip Hoang MD;  Location: [facility] ANGIO INVASIVE LOCATION;  Service: Interventional Radiology   • PACEMAKER IMPLANTATION       Family History   Problem Relation Age of Onset   • Hypertension Mother    • Diabetes Mother    • Hypertension Father    • Diabetes Father    • Asthma Maternal Grandfather      Social History     Tobacco Use   • Smoking status: Never Smoker   • Smokeless tobacco: Never Used   Substance Use Topics   • Alcohol use: No   • Drug use: No       ALLERGIES:  Allergies   Allergen Reactions   • Motrin [Ibuprofen] Hives         MEDICATIONS:    Current Outpatient Medications:   •  albuterol (PROVENTIL HFA;VENTOLIN HFA) 108 (90 BASE) MCG/ACT inhaler, Inhale 2 puffs Every 4 (Four) Hours As Needed for Wheezing., Disp: 1 inhaler, Rfl: 1  •  amiodarone (PACERONE) 200 MG tablet, Take 200 mg by mouth 2 (Two) Times a Day., Disp: , Rfl: 12  •  Cholecalciferol (VITAMIN D3) 99638 units capsule, Take 1 capsule by mouth Every 7 (Seven) Days.,  Disp: 4 capsule, Rfl: 1  •  clopidogrel (PLAVIX) 75 MG tablet, Take 1 tablet by mouth Daily., Disp: 30 tablet, Rfl: 1  •  cyclobenzaprine (FLEXERIL) 5 MG tablet, Take 1 tablet by mouth 3 (Three) Times a Day As Needed for Muscle Spasms (for pain)., Disp: 90 tablet, Rfl: 1  •  doxercalciferol (HECTOROL) 4 MCG/2ML injection, Infuse 1 mL into a venous catheter 3 (Three) Times a Week., Disp: , Rfl:   •  hydrALAZINE (APRESOLINE) 50 MG tablet, Take 75 mg by mouth 3 (Three) Times a Day., Disp: , Rfl:   •  HYDROcodone-acetaminophen (NORCO) 5-325 MG per tablet, Take 1 tablet by mouth Every 4 (Four) Hours As Needed (Pain)., Disp: 30 tablet, Rfl: 0  •  insulin glargine (LANTUS) 100 UNIT/ML injection, Inject 30 Units under the skin into the appropriate area as directed Every Night., Disp: , Rfl:   •  lisinopril (PRINIVIL,ZESTRIL) 10 MG tablet, Take 1 tablet by mouth Every Night., Disp: 30 tablet, Rfl: 1  •  metoprolol tartrate (LOPRESSOR) 50 MG tablet, Take 50 mg by mouth 2 (Two) Times a Day., Disp: , Rfl: 12  •  sevelamer (RENAGEL) 800 MG tablet, Take 2 tablets by mouth 3 (Three) Times a Day With Meals., Disp: 180 tablet, Rfl: 1    Current Facility-Administered Medications:   •  lidocaine-prilocaine (EMLA) 2.5-2.5 % cream, , Topical, PRN, Alyssa Rutherford, APRN    Review of Systems   Review of Systems   Constitution: Positive for malaise/fatigue. Negative for weakness and weight loss.   Cardiovascular: Negative for chest pain, claudication and dyspnea on exertion.   Respiratory: Negative for cough and shortness of breath.    Skin: Negative for color change and poor wound healing.   Neurological: Negative for dizziness and numbness.       Physical Exam   Vitals:    08/09/19 1050   BP: 120/68   BP Location: Right arm   Pulse: 65   Temp: 97.8 °F (36.6 °C)   TempSrc: Temporal   SpO2: 100%     Physical Exam   Constitutional: He is oriented to person, place, and time. He is active and cooperative. He does not appear ill. No  distress.   HENT:   Right Ear: Hearing normal.   Left Ear: Hearing normal.   Nose: No nasal deformity. No epistaxis.   Mouth/Throat: He does not have dentures. Normal dentition.   Cardiovascular: Normal rate and regular rhythm.   No murmur heard.  Pulses:       Carotid pulses are 2+ on the right side, and 2+ on the left side.       Radial pulses are 2+ on the right side, and 2+ on the left side.   Pulmonary/Chest: Effort normal and breath sounds normal.   Abdominal: Soft. He exhibits no distension and no mass. There is no tenderness.   Musculoskeletal: He exhibits no deformity.   Gait normal.    Neurological: He is alert and oriented to person, place, and time. He has normal strength.   Skin: Skin is warm and dry. No cyanosis or erythema. No pallor.   No venous staining   Psychiatric: He has a normal mood and affect. His speech is normal. Judgment and thought content normal.       ASSESSMENT:  Jamil was seen today for tunneled catheter removal.    Diagnoses and all orders for this visit:    ESRD (end stage renal disease) on dialysis (CMS/[facility])    Essential hypertension    Coronary artery disease involving native coronary artery of native heart without angina pectoris    A-V fistula (CMS/[facility])    Acute on chronic systolic heart failure (CMS/HCC)    Type 2 diabetes mellitus with diabetic polyneuropathy, with long-term current use of insulin (CMS/HCC)    PLAN:  Detailed discussion with Jamil Olivo regarding situation and options.  functional long term dialysis access.  ready for tunnel cath removal.      Risks:  bleeding, catheter breakage which may require retrieval.  Benefits:  catheter removal, decrease incidence infection, blood clots.  Options:  none.  Understands and wishes to proceed.    Remove tunnel cath today.  Return in 3 months with fistula duplex.    Return after above studies complete  Obesity Class  0. Increased risk cardiovascular events, sleep and breathing disorders, joint issues, type 2  diabetes mellitus. Options for weight management, heart healthy diet, exercise programs, and associated health risks of obesity discussed.  Recommended regular physical activity, progressive walking program.  Continue current medications as directed.    Thank you for the opportunity to participate in this patient's care.    Copy to primary care provider.    EMR Dragon/Transcription disclaimer:   Much of this encounter note is an electronic transcription/translation of spoken language to printed text. The electronic translation of spoken language may permit erroneous, or at times, nonsensical words or phrases to be inadvertently transcribed; Although I have reviewed the note for such errors, some may still exist.             OPERATIVE NOTE  Jamil Olivo  1965      PREOP DIAGNOSES:  ESRD    POSTOP DIAGNOSES:  ESRD    PROCEDURE:  Removal tunnelled central venous catheter with cuff    SURGEON: ALY Hoang MD FACS RPVI    ASSISTANT: Hunter Moreno CFA    ANESTHESIA: local 1% lidocaine    ESTIMATED BLOOD LOSS: minimal    COMPLICATIONS: none    DESCRIPTION OF OPERATION: taken to procedure room, placed supine position, prepped draped sterile fashion.  1% lidocaine local anesthesia around cuff.      1cm incision make over cuff.  Distal catheter dissected free, removed intact.  Manual pressure held at vein insertion site with good hemostasis.  Cuff dissected free, catheter divided.  Proximal portion removed out exit site intact.  Incision closed with 4-0 nylon vertical mattress sutures x2, exit site left open to drain.  Pressure dressing applied.  Tolerated procedure well, home.           This document has been electronically signed by Kip Hoang MD on August 14, 2019 3:06 PM

## 2019-09-06 ENCOUNTER — OFFICE VISIT (OUTPATIENT)
Dept: CARDIAC SURGERY | Facility: CLINIC | Age: 54
End: 2019-09-06

## 2019-09-06 VITALS
SYSTOLIC BLOOD PRESSURE: 155 MMHG | OXYGEN SATURATION: 98 % | WEIGHT: 212 LBS | HEART RATE: 70 BPM | TEMPERATURE: 98 F | BODY MASS INDEX: 29.68 KG/M2 | DIASTOLIC BLOOD PRESSURE: 70 MMHG | HEIGHT: 71 IN

## 2019-09-06 DIAGNOSIS — N18.6 ESRD (END STAGE RENAL DISEASE) ON DIALYSIS (HCC): ICD-10-CM

## 2019-09-06 DIAGNOSIS — I77.0 A-V FISTULA (HCC): Primary | ICD-10-CM

## 2019-09-06 DIAGNOSIS — Z99.2 ESRD (END STAGE RENAL DISEASE) ON DIALYSIS (HCC): ICD-10-CM

## 2019-09-06 PROCEDURE — 99213 OFFICE O/P EST LOW 20 MIN: CPT | Performed by: NURSE PRACTITIONER

## 2019-09-06 RX ORDER — FUROSEMIDE 40 MG/1
40 TABLET ORAL 2 TIMES DAILY
COMMUNITY
End: 2023-03-31

## 2019-09-06 NOTE — PATIENT INSTRUCTIONS
The results of your fistula duplex shows good blood flow which is adequate for your dialysis.  Follow up in 3 months for repeat fistula duplex.  If you are to encounter issues with your fistula including but not limited to clotting, bleeding, difficulty completing dialysis, color changes of the finger tips, or pain please call the heart and vascular center for earlier appointment.

## 2019-09-06 NOTE — PROGRESS NOTES
CVTS Office Progress Note       Subjective   Patient ID: Jamil Olivo is a 53 y.o. male is here today for follow-up.    Chief Complaint:    Chief Complaint   Patient presents with   • Hemodialysis Access     follow up        The following portions of the patient's history were reviewed and updated as appropriate: allergies, current medications, past family history, past medical history, past social history, past surgical history and problem list.  Recent images independently reviewed.  Available laboratory values reviewed.    PCP:  John Hyde MD  Cardiology:  Kd  Nephrology: [facility]    53 y.o. male with HTN(stable, increased risk stroke, rupture), Hyperlipidemia(stable, increased risk cardiovascular events), Diabetes Mellitus(stable, increased risk cardiovascular events) and Chronic Kidney Disease(stable, increased risk renal failure) .  never smoked.  ESRD on hemodialysis since 11/2018. Requires long term AV access for hemodialysis.  Seen today with difficulty accessing fistula at dialysis yesterday.  No other associated signs, symptoms or modifying factors.    4/2019 Upper extremity vein mapping:  RIGHT cephalic 1.1-4.3mm, basilic 1.4-2.6mm.  LEFT cephalic 2.6-5.2mm.  Distal radial 2.0mm.  High bifurcation brachial artery prox mid humerus.    3/2017 Echocardiogram:  EF 15% LA 54mm, LV 70mm, RVSP 52mmHg.  Mild TR.  Mild-moderate MR.  8/2017 Stress Test:  EF 20%, moderate to severe inferolateral basal ischemia.  Moderate inferior scar.  11/2018 Cardiac Catheterization:  LAD mild irregularities, %, %.  EF 20%.  11/2018 ICD placement (LEFT SCV)  11/19/18: Place tunnelled central venous catheter   4/24/19: LEFT Radial-Cephalic AV Fistula Placement  6/12/19: LEFT fistula duplex: Patent. mPSV 487cm/s. Size 2.6-7.6mm. Flows 531-2733ml/m  7/22/19: LEFT fistula duplex: Patent. mPSV  379cms. Size 1.7-6.5mm. Flows 212-1248ml/m.  9/6/19 Left Fistula Duplex: Patent.  mPSV 446cm/s.  Size 4.6-7.2mm.  Flows 933-2900ml/min    Past Medical History:   Diagnosis Date   • Asthma    • CAD (coronary artery disease) 11/19/2018   • CHF (congestive heart failure) (CMS/[facility])    • Diabetes mellitus (CMS/[facility])    • Diabetic neuropathy (CMS/[facility]) 2014   • Dialysis patient (CMS/[facility])    • Hypertension    • Kidney disease 2018    pt states that he is close to needing to be on dialysis     Past Surgical History:   Procedure Laterality Date   • ARTERIOVENOUS FISTULA/SHUNT SURGERY Left 4/24/2019    Procedure: radial artery to cephalic vein ARTERIOVENOUS FISTULA;  Surgeon: Kip Hoang MD;  Location: [facility] OR;  Service: Vascular   • CARDIAC CATHETERIZATION N/A 11/16/2018    Procedure: Left Heart Cath;  Surgeon: Robin Yi MD;  Location: [facility] CATH INVASIVE LOCATION;  Service: Cardiology   • CARDIAC ELECTROPHYSIOLOGY PROCEDURE N/A 11/20/2018    Procedure: ICD SC new;  Surgeon: Garcia Haque MD;  Location: [facility] CATH INVASIVE LOCATION;  Service: Cardiology   • EYE SURGERY Bilateral 12/2017   • INTERVENTIONAL RADIOLOGY PROCEDURE N/A 11/19/2018    Procedure: tunneled central venous catheter placement;  Surgeon: Kip Hoang MD;  Location: [facility] ANGIO INVASIVE LOCATION;  Service: Interventional Radiology   • PACEMAKER IMPLANTATION       Family History   Problem Relation Age of Onset   • Hypertension Mother    • Diabetes Mother    • Hypertension Father    • Diabetes Father    • Asthma Maternal Grandfather      Social History     Tobacco Use   • Smoking status: Never Smoker   • Smokeless tobacco: Never Used   Substance Use Topics   • Alcohol use: No   • Drug use: No       ALLERGIES:   Motrin [ibuprofen]    MEDICATIONS:    Current Outpatient Medications:   •  albuterol (PROVENTIL HFA;VENTOLIN HFA) 108 (90 BASE) MCG/ACT inhaler, Inhale 2 puffs Every 4 (Four) Hours As Needed for Wheezing., Disp: 1 inhaler, Rfl: 1  •  amiodarone (PACERONE) 200 MG tablet, Take 200 mg by mouth 2 (Two) Times a Day.,  Disp: , Rfl: 12  •  Cholecalciferol (VITAMIN D3) 84227 units capsule, Take 1 capsule by mouth Every 7 (Seven) Days., Disp: 4 capsule, Rfl: 1  •  clopidogrel (PLAVIX) 75 MG tablet, Take 1 tablet by mouth Daily., Disp: 30 tablet, Rfl: 1  •  cyclobenzaprine (FLEXERIL) 5 MG tablet, Take 1 tablet by mouth 3 (Three) Times a Day As Needed for Muscle Spasms (for pain)., Disp: 90 tablet, Rfl: 1  •  doxercalciferol (HECTOROL) 4 MCG/2ML injection, Infuse 1 mL into a venous catheter 3 (Three) Times a Week., Disp: , Rfl:   •  furosemide (LASIX) 40 MG tablet, Take 40 mg by mouth 2 (Two) Times a Day., Disp: , Rfl:   •  hydrALAZINE (APRESOLINE) 50 MG tablet, Take 75 mg by mouth 3 (Three) Times a Day., Disp: , Rfl:   •  HYDROcodone-acetaminophen (NORCO) 5-325 MG per tablet, Take 1 tablet by mouth Every 4 (Four) Hours As Needed (Pain)., Disp: 30 tablet, Rfl: 0  •  insulin glargine (LANTUS) 100 UNIT/ML injection, Inject 30 Units under the skin into the appropriate area as directed Every Night., Disp: , Rfl:   •  lisinopril (PRINIVIL,ZESTRIL) 10 MG tablet, Take 1 tablet by mouth Every Night., Disp: 30 tablet, Rfl: 1  •  metoprolol tartrate (LOPRESSOR) 50 MG tablet, Take 50 mg by mouth 2 (Two) Times a Day., Disp: , Rfl: 12  •  sevelamer (RENAGEL) 800 MG tablet, Take 2 tablets by mouth 3 (Three) Times a Day With Meals., Disp: 180 tablet, Rfl: 1    Current Facility-Administered Medications:   •  lidocaine-prilocaine (EMLA) 2.5-2.5 % cream, , Topical, PRN, Alyssa Rutherford, APRKIKI    Review of Systems   Cardiovascular:        Fistula bleeding (N). Fistula Pain (N). Extremity Pain (N). Extremity Discoloration (N)     Hematologic/Lymphatic: Negative for bleeding problem.   Skin: Negative for color change and nail changes.   Neurological: Negative for numbness and paresthesias.   All other systems reviewed and are negative.       Objective   Temp:  [98 °F (36.7 °C)] 98 °F (36.7 °C)  Heart Rate:  [70] 70  BP: (155)/(70) 155/70   Vitals:     09/06/19 1146   BP: 155/70   Pulse: 70   Temp: 98 °F (36.7 °C)   SpO2: 98%      Body mass index is 29.57 kg/m².  Physical Exam   Constitutional: He is oriented to person, place, and time. He appears well-nourished.   HENT:   Head: Atraumatic.   Eyes: EOM are normal.   Cardiovascular: Normal rate.   Pulses:       Radial pulses are 2+ on the left side.   Fistula Present LEFT Extremity: (+)thrill/(+)bruit/(+)pulse. Aneurysmal (N). Water Hammer Pulse (N). Thinning (N).  Hilario's Test <3sec (Y).  Skin warm/dry/pink/intact (Y). Radial Pulse (Y).     Pulmonary/Chest: Effort normal and breath sounds normal.   Abdominal: Soft.   Musculoskeletal: Normal range of motion.   Gait normal   Neurological: He is alert and oriented to person, place, and time. No cranial nerve deficit.   Skin: Skin is warm and dry. Capillary refill takes less than 2 seconds.   No venous staining   Psychiatric: He has a normal mood and affect. Thought content normal.   Vitals reviewed.        Assessment & Plan     Independent Review of Radiographic Studies:    9/6/19 Left Fistula Duplex: Patent.  mPSV 446cm/s. Size 4.6-7.2mm.  Flows 933-2900ml/min    1. A-V fistula (CMS/HCC)  Patent fistula per duplex scan, no flow limiting stenosis, size adequate.  Follow up in 3 months for fistula duplex  - Duplex Hemodialysis Access CAR; Future    2. ESRD (end stage renal disease) on dialysis (CMS/HCC)  Continue dialysis as scheduled. If problems should arise including difficulty with access, high pressure alarms, or inability to complete dialysis please notify [facility] immediately for evaluation.                   This document has been electronically signed by NICK Fisher on September 6, 2019 2:39 PM

## 2019-11-26 ENCOUNTER — CONSULT (OUTPATIENT)
Dept: SURGERY | Facility: CLINIC | Age: 54
End: 2019-11-26

## 2019-11-26 ENCOUNTER — APPOINTMENT (OUTPATIENT)
Dept: PREADMISSION TESTING | Facility: HOSPITAL | Age: 54
End: 2019-11-26

## 2019-11-26 ENCOUNTER — ANESTHESIA EVENT (OUTPATIENT)
Dept: PERIOP | Facility: HOSPITAL | Age: 54
End: 2019-11-26

## 2019-11-26 VITALS
OXYGEN SATURATION: 99 % | BODY MASS INDEX: 32.34 KG/M2 | SYSTOLIC BLOOD PRESSURE: 120 MMHG | WEIGHT: 231 LBS | RESPIRATION RATE: 16 BRPM | DIASTOLIC BLOOD PRESSURE: 60 MMHG | HEIGHT: 71 IN | HEART RATE: 93 BPM

## 2019-11-26 VITALS
BODY MASS INDEX: 32.4 KG/M2 | SYSTOLIC BLOOD PRESSURE: 120 MMHG | TEMPERATURE: 98 F | HEART RATE: 93 BPM | DIASTOLIC BLOOD PRESSURE: 60 MMHG | WEIGHT: 231.4 LBS | HEIGHT: 71 IN

## 2019-11-26 DIAGNOSIS — N18.6 STAGE 5 CHRONIC KIDNEY DISEASE ON CHRONIC DIALYSIS (HCC): Primary | ICD-10-CM

## 2019-11-26 DIAGNOSIS — Z99.2 STAGE 5 CHRONIC KIDNEY DISEASE ON CHRONIC DIALYSIS (HCC): Primary | ICD-10-CM

## 2019-11-26 PROCEDURE — 99214 OFFICE O/P EST MOD 30 MIN: CPT | Performed by: SURGERY

## 2019-11-26 PROCEDURE — 93010 ELECTROCARDIOGRAM REPORT: CPT | Performed by: INTERNAL MEDICINE

## 2019-11-26 PROCEDURE — 93005 ELECTROCARDIOGRAM TRACING: CPT

## 2019-11-26 RX ORDER — SODIUM CHLORIDE 9 MG/ML
50 INJECTION, SOLUTION INTRAVENOUS CONTINUOUS
Status: CANCELLED | OUTPATIENT
Start: 2019-12-03

## 2019-11-26 RX ORDER — BUPIVACAINE HCL/0.9 % NACL/PF 0.1 %
2 PLASTIC BAG, INJECTION (ML) EPIDURAL ONCE
Status: CANCELLED | OUTPATIENT
Start: 2019-12-03 | End: 2019-11-26

## 2019-11-26 RX ORDER — CALCITRIOL 0.25 UG/1
0.25 CAPSULE, LIQUID FILLED ORAL 3 TIMES DAILY
COMMUNITY

## 2019-11-26 NOTE — PATIENT INSTRUCTIONS

## 2019-11-26 NOTE — PROGRESS NOTES
Chief Complaint   Patient presents with   • Chronic Renal Failure     peritoneal dialysis catheter        HPI  54-year-old man receiving hemodialysis.  He is felt to be a candidate for peritoneal dialysis and is seen for consideration of peritoneal dialysis catheter placement.  He has no previous abdominal surgeries.  Past Medical History:   Diagnosis Date   • A-fib (CMS/[facility])    • Asthma    • CAD (coronary artery disease) 11/19/2018   • CHF (congestive heart failure) (CMS/[facility])    • Diabetes mellitus (CMS/[facility])    • Diabetic neuropathy (CMS/[facility]) 2014   • Diabetic neuropathy (CMS/[facility])    • Dialysis patient (CMS/[facility])    • Hypertension    • Kidney disease 2018    pt states that he is close to needing to be on dialysis   • Presence of combination internal cardiac defibrillator (ICD) and pacemaker    • Stage 5 chronic kidney disease on chronic dialysis (CMS/[facility]) 11/26/2019    Added automatically from request for surgery [id number]       Past Surgical History:   Procedure Laterality Date   • ARTERIOVENOUS FISTULA/SHUNT SURGERY Left 4/24/2019    Procedure: radial artery to cephalic vein ARTERIOVENOUS FISTULA;  Surgeon: Kip Hoang MD;  Location: [facility] OR;  Service: Vascular   • CARDIAC CATHETERIZATION N/A 11/16/2018    Procedure: Left Heart Cath;  Surgeon: Robin Yi MD;  Location: [facility] CATH INVASIVE LOCATION;  Service: Cardiology   • CARDIAC ELECTROPHYSIOLOGY PROCEDURE N/A 11/20/2018    Procedure: ICD SC new;  Surgeon: Garcia Haque MD;  Location: [facility] CATH INVASIVE LOCATION;  Service: Cardiology   • CATARACT EXTRACTION Bilateral    • EYE SURGERY Right    • INTERVENTIONAL RADIOLOGY PROCEDURE N/A 11/19/2018    Procedure: tunneled central venous catheter placement;  Surgeon: Kip Hoang MD;  Location: [facility] ANGIO INVASIVE LOCATION;  Service: Interventional Radiology   • PACEMAKER IMPLANTATION           Current Outpatient Medications:   •  albuterol (PROVENTIL HFA;VENTOLIN HFA) 108 (90 BASE)  MCG/ACT inhaler, Inhale 2 puffs Every 4 (Four) Hours As Needed for Wheezing., Disp: 1 inhaler, Rfl: 1  •  amiodarone (PACERONE) 200 MG tablet, Take 200 mg by mouth Daily., Disp: , Rfl: 12  •  clopidogrel (PLAVIX) 75 MG tablet, Take 1 tablet by mouth Daily., Disp: 30 tablet, Rfl: 1  •  furosemide (LASIX) 40 MG tablet, Take 40 mg by mouth 2 (Two) Times a Day., Disp: , Rfl:   •  insulin glargine (LANTUS) 100 UNIT/ML injection, Inject 30 Units under the skin into the appropriate area as directed Every Night., Disp: , Rfl:   •  lisinopril (PRINIVIL,ZESTRIL) 10 MG tablet, Take 1 tablet by mouth Every Night., Disp: 30 tablet, Rfl: 1  •  metoprolol tartrate (LOPRESSOR) 50 MG tablet, Take 50 mg by mouth 2 (Two) Times a Day. One takes one tab on day of dialysis, Disp: , Rfl: 12  •  sevelamer (RENAGEL) 800 MG tablet, Take 2 tablets by mouth 3 (Three) Times a Day With Meals., Disp: 180 tablet, Rfl: 1  •  calcitriol (ROCALTROL) 0.25 MCG capsule, Take 0.25 mcg by mouth 3 (Three) Times a Day., Disp: , Rfl:   •  Cholecalciferol (VITAMIN D3) 03189 units capsule, Take 1 capsule by mouth Every 7 (Seven) Days., Disp: 4 capsule, Rfl: 1    Current Facility-Administered Medications:   •  lidocaine-prilocaine (EMLA) 2.5-2.5 % cream, , Topical, PRN, Sangeeta, Alyssa Lauren, APRN    Allergies   Allergen Reactions   • Motrin [Ibuprofen] Hives       Family History   Problem Relation Age of Onset   • Hypertension Mother    • Diabetes Mother    • Hypertension Father    • Diabetes Father    • Asthma Maternal Grandfather        Social History     Socioeconomic History   • Marital status:      Spouse name: Not on file   • Number of children: Not on file   • Years of education: Not on file   • Highest education level: Not on file   Tobacco Use   • Smoking status: Never Smoker   • Smokeless tobacco: Never Used   Substance and Sexual Activity   • Alcohol use: No   • Drug use: No   • Sexual activity: Defer       Review of Systems   Constitutional:  Negative for activity change, appetite change, chills and fever.   HENT: Negative for hearing loss, nosebleeds and trouble swallowing.    Eyes: Positive for visual disturbance.   Cardiovascular: Negative for chest pain, palpitations and leg swelling.   Gastrointestinal: Negative for abdominal distention, abdominal pain, anal bleeding, blood in stool, constipation, diarrhea, nausea, rectal pain and vomiting.   Endocrine: Negative for cold intolerance, heat intolerance, polydipsia and polyuria.   Genitourinary: Negative for decreased urine volume, difficulty urinating, dysuria, enuresis, frequency, hematuria and urgency.   Musculoskeletal: Negative for arthralgias, back pain, gait problem, myalgias and neck pain.   Skin: Negative for pallor, rash and wound.   Allergic/Immunologic: Negative for immunocompromised state.   Neurological: Negative for dizziness, seizures, weakness, light-headedness, numbness and headaches.   Psychiatric/Behavioral: Negative for agitation and behavioral problems. The patient is not nervous/anxious.        Physical Exam   Constitutional: He is oriented to person, place, and time. He appears well-developed and well-nourished.   HENT:   Head: Normocephalic and atraumatic.   Nose: Nose normal.   Eyes: Conjunctivae and EOM are normal. Right eye exhibits no discharge. Left eye exhibits no discharge.   Neck: Trachea normal, normal range of motion and phonation normal. Neck supple. No JVD present. No tracheal deviation and no edema present. No thyromegaly present.   Cardiovascular: Normal rate, regular rhythm and normal heart sounds. Exam reveals no gallop and no friction rub.   No murmur heard.  Pulmonary/Chest: Effort normal and breath sounds normal. No accessory muscle usage. No respiratory distress. He has no decreased breath sounds. He has no wheezes. He has no rales. He exhibits no tenderness.   Abdominal: Soft. He exhibits no distension, no fluid wave, no ascites, no pulsatile midline mass  and no mass. There is no tenderness. There is no rebound and no guarding. No hernia.   Musculoskeletal: Normal range of motion. He exhibits no edema, tenderness or deformity.   Lymphadenopathy:     He has no cervical adenopathy.        Left: No supraclavicular adenopathy present.   Neurological: He is alert and oriented to person, place, and time. He has normal strength. No cranial nerve deficit.   Skin: Skin is warm and dry. No rash noted. He is not diaphoretic. No erythema. No pallor.        Psychiatric: He has a normal mood and affect. His speech is normal and behavior is normal. Judgment and thought content normal. Cognition and memory are normal.   Vitals reviewed.        ASSESSMENT    Jamil was seen today for chronic renal failure.    Diagnoses and all orders for this visit:    Stage 5 chronic kidney disease on chronic dialysis (CMS/[facility])  -     Case Request; Standing  -     sodium chloride 0.9 % infusion  -     ceFAZolin (ANCEF) 2 g in sodium chloride 0.9 % 100 mL IVPB  -     Case Request    Other orders  -     Follow Anesthesia Guidelines / Standing Orders; Future  -     Provide Chlorhexidine Skin Prep Wipes and Instructions; Future  -     Follow Anesthesia Guidelines / Standing Orders; Standing  -     Provide instructions to patient on NPO status; Future  -     Obtain Informed Consent; Standing  -     Place sequential compression device- to be placed on patient in Pre-op; Standing  -     Verify / Perform Chlorhexidine Skin Prep; Standing        PLAN    1.  Laparoscopic possible open peritoneal dialysis catheter placement is planned.    The following were discussed with the patient:    What are the indications that have led your doctor to the opinion that an operation is necessary?    KIdney function has decreased to a point that renal replacement therapy is deemed necessary despite medical managemen.    What, if any, alternative treatments are available for your condition?    Alternatives include ongoing  medication or hemodialysis.    What will be the likely result if you don't have the operation?    Worsening renal failure could lead to coma, heart or lung failure, and other consequences of untreated renal failure.    What are the basic procedures involved in the operation?    The procedures explained to the patient. The abdomen is filled with carbon dioxide gas. Small incisions and tubes are placed into the abdomen. A laparoscope is placed into the abdomen the catheter will be placed into the area behind the bladder under direct vision and then positioned to exit on the abdominal wall or chest..    What are the risks?    Complications are explained and include those that are early (occurring < 30 days post placement) or late (>30 days post placement).      Early complications include but are not limited to:     Bowel perforation - less than 1%, and it usually occurs during entry into the abdominal cavity. Surgical exploration is necessary with repair of the perforation and removal of the catheter.    Bleeding - rarely a significant problem after peritoneal dialysis catheter placement. When bleeding occurs, it is usually at the exit site.  Wound infection- Wound infection is uncommon and often can be treated with antibiotics when it is superficial. If the wound is deeper, then it may need to be drained and the catheter removed.  Outflow failure- may be due to several causes including clots or fibrin in the catheter, a kink in the subcutaneous tunnel, encasement of the catheter by the omentum, or adhesions. Laparoscopy may be required for identification and treatment of obstruction due to omentum or adhesions.   Leakage of the dialysate- may be identified by the presence of drainage at the exit site or the appearance of a bulge underneath the entrance site.  Peritonitis- may occur early and manifests as abdominal pain associated with cloudy peritoneal fluid. The fluid should be cultured, and appropriate antibiotics  should be administered. Catheter removal may be necessary.     Late complications may include but are not limited to:     Infection of the exit site or tunnel   Cuff protrusion  Outflow failure or dialysate leaks  Hernias  Peritonitis  Inadequate dialysis    Any complicaion can lead to additional operation or modified incisions.  How is the operation expected to improve your health or quality of life?    Peritoneal dialysis allows home dialysis daily without the need for frequent visits to the dialysis center. Renal replacement therapy can, therefore, occur in a more convenient setting.    Is hospitalization necessary and, if so, how long can you expect to be hospitalized?    The procedure is usually performed on an outpatient basis.    What can you expect during your recovery period?    Gas from the laparoscopy can cause bloating and shoulder pain. Pain is managed with a combination of opoid and non-opoid medicine given by mouth.    When can you expect to resume normal activities?    Normal activity can be resumed within 1 week.    Are there likely to be residual effects from the operation?    No common residual effects occur.     The patient has been explained the risks and benefits, has a clear understanding of the procedure. All questions answered. Desires to proceed.                     This document has been electronically signed by Valerio Webster MD on November 27, 2019 6:31 PM

## 2019-12-03 ENCOUNTER — ANESTHESIA (OUTPATIENT)
Dept: PERIOP | Facility: HOSPITAL | Age: 54
End: 2019-12-03

## 2019-12-03 ENCOUNTER — HOSPITAL ENCOUNTER (OUTPATIENT)
Facility: HOSPITAL | Age: 54
Setting detail: HOSPITAL OUTPATIENT SURGERY
Discharge: HOME | End: 2019-12-03
Attending: SURGERY | Admitting: SURGERY

## 2019-12-03 VITALS
HEART RATE: 64 BPM | HEIGHT: 71 IN | DIASTOLIC BLOOD PRESSURE: 76 MMHG | BODY MASS INDEX: 32.07 KG/M2 | WEIGHT: 229.06 LBS | OXYGEN SATURATION: 98 % | TEMPERATURE: 97.5 F | RESPIRATION RATE: 16 BRPM | SYSTOLIC BLOOD PRESSURE: 129 MMHG

## 2019-12-03 DIAGNOSIS — N18.6 STAGE 5 CHRONIC KIDNEY DISEASE ON CHRONIC DIALYSIS (HCC): ICD-10-CM

## 2019-12-03 DIAGNOSIS — Z99.2 STAGE 5 CHRONIC KIDNEY DISEASE ON CHRONIC DIALYSIS (HCC): ICD-10-CM

## 2019-12-03 LAB
ANION GAP SERPL CALCULATED.3IONS-SCNC: 15 MMOL/L (ref 5–15)
BUN BLD-MCNC: 95 MG/DL (ref 6–20)
BUN/CREAT SERPL: 9.6 (ref 7–25)
CALCIUM SPEC-SCNC: 9.4 MG/DL (ref 8.6–10.5)
CHLORIDE SERPL-SCNC: 97 MMOL/L (ref 98–107)
CO2 SERPL-SCNC: 28 MMOL/L (ref 22–29)
CREAT BLD-MCNC: 9.87 MG/DL (ref 0.76–1.27)
GFR SERPL CREATININE-BSD FRML MDRD: 7 ML/MIN/1.73
GFR SERPL CREATININE-BSD FRML MDRD: ABNORMAL ML/MIN/{1.73_M2}
GLUCOSE BLD-MCNC: 79 MG/DL (ref 65–99)
GLUCOSE BLDC GLUCOMTR-MCNC: 84 MG/DL (ref 70–130)
POTASSIUM BLD-SCNC: 4.3 MMOL/L (ref 3.5–5.2)
SODIUM BLD-SCNC: 140 MMOL/L (ref 136–145)

## 2019-12-03 PROCEDURE — 25010000002 NEOSTIGMINE 4 MG/4ML SOLUTION PREFILLED SYRINGE: Performed by: NURSE ANESTHETIST, CERTIFIED REGISTERED

## 2019-12-03 PROCEDURE — 25010000002 MIDAZOLAM PER 1 MG: Performed by: NURSE ANESTHETIST, CERTIFIED REGISTERED

## 2019-12-03 PROCEDURE — 25010000002 FENTANYL CITRATE (PF) 100 MCG/2ML SOLUTION: Performed by: NURSE ANESTHETIST, CERTIFIED REGISTERED

## 2019-12-03 PROCEDURE — 25010000002 SUCCINYLCHOLINE PER 20 MG: Performed by: NURSE ANESTHETIST, CERTIFIED REGISTERED

## 2019-12-03 PROCEDURE — 25010000002 ONDANSETRON PER 1 MG: Performed by: NURSE ANESTHETIST, CERTIFIED REGISTERED

## 2019-12-03 PROCEDURE — 49324 LAP INSERT TUNNEL IP CATH: CPT | Performed by: SURGERY

## 2019-12-03 PROCEDURE — C1750 CATH, HEMODIALYSIS,LONG-TERM: HCPCS | Performed by: SURGERY

## 2019-12-03 PROCEDURE — 80048 BASIC METABOLIC PNL TOTAL CA: CPT | Performed by: ANESTHESIOLOGY

## 2019-12-03 PROCEDURE — 82962 GLUCOSE BLOOD TEST: CPT

## 2019-12-03 PROCEDURE — 25010000002 DEXAMETHASONE PER 1 MG: Performed by: NURSE ANESTHETIST, CERTIFIED REGISTERED

## 2019-12-03 PROCEDURE — 25010000002 PROPOFOL 10 MG/ML EMULSION: Performed by: NURSE ANESTHETIST, CERTIFIED REGISTERED

## 2019-12-03 DEVICE — IMPLANTABLE DEVICE: Type: IMPLANTABLE DEVICE | Site: ABDOMEN | Status: FUNCTIONAL

## 2019-12-03 RX ORDER — SODIUM CHLORIDE 9 MG/ML
50 INJECTION, SOLUTION INTRAVENOUS CONTINUOUS
Status: DISCONTINUED | OUTPATIENT
Start: 2019-12-03 | End: 2019-12-03 | Stop reason: HOSPADM

## 2019-12-03 RX ORDER — SUCCINYLCHOLINE CHLORIDE 20 MG/ML
INJECTION INTRAMUSCULAR; INTRAVENOUS AS NEEDED
Status: DISCONTINUED | OUTPATIENT
Start: 2019-12-03 | End: 2019-12-03 | Stop reason: SURG

## 2019-12-03 RX ORDER — ROCURONIUM BROMIDE 10 MG/ML
INJECTION, SOLUTION INTRAVENOUS AS NEEDED
Status: DISCONTINUED | OUTPATIENT
Start: 2019-12-03 | End: 2019-12-03 | Stop reason: SURG

## 2019-12-03 RX ORDER — NALOXONE HCL 0.4 MG/ML
0.4 VIAL (ML) INJECTION AS NEEDED
Status: DISCONTINUED | OUTPATIENT
Start: 2019-12-03 | End: 2019-12-03 | Stop reason: HOSPADM

## 2019-12-03 RX ORDER — PROMETHAZINE HYDROCHLORIDE 25 MG/1
25 SUPPOSITORY RECTAL ONCE AS NEEDED
Status: DISCONTINUED | OUTPATIENT
Start: 2019-12-03 | End: 2019-12-03 | Stop reason: HOSPADM

## 2019-12-03 RX ORDER — EPHEDRINE SULFATE 50 MG/ML
INJECTION, SOLUTION INTRAVENOUS AS NEEDED
Status: DISCONTINUED | OUTPATIENT
Start: 2019-12-03 | End: 2019-12-03 | Stop reason: SURG

## 2019-12-03 RX ORDER — HYDROCODONE BITARTRATE AND ACETAMINOPHEN 7.5; 325 MG/1; MG/1
1 TABLET ORAL EVERY 6 HOURS PRN
Qty: 12 TABLET | Refills: 0 | Status: SHIPPED | OUTPATIENT
Start: 2019-12-03 | End: 2020-11-03

## 2019-12-03 RX ORDER — ONDANSETRON 2 MG/ML
INJECTION INTRAMUSCULAR; INTRAVENOUS AS NEEDED
Status: DISCONTINUED | OUTPATIENT
Start: 2019-12-03 | End: 2019-12-03 | Stop reason: SURG

## 2019-12-03 RX ORDER — DIPHENHYDRAMINE HYDROCHLORIDE 50 MG/ML
12.5 INJECTION INTRAMUSCULAR; INTRAVENOUS
Status: DISCONTINUED | OUTPATIENT
Start: 2019-12-03 | End: 2019-12-03 | Stop reason: HOSPADM

## 2019-12-03 RX ORDER — FENTANYL CITRATE 50 UG/ML
INJECTION, SOLUTION INTRAMUSCULAR; INTRAVENOUS AS NEEDED
Status: DISCONTINUED | OUTPATIENT
Start: 2019-12-03 | End: 2019-12-03 | Stop reason: SURG

## 2019-12-03 RX ORDER — PROMETHAZINE HYDROCHLORIDE 25 MG/ML
12.5 INJECTION, SOLUTION INTRAMUSCULAR; INTRAVENOUS ONCE AS NEEDED
Status: DISCONTINUED | OUTPATIENT
Start: 2019-12-03 | End: 2019-12-03 | Stop reason: HOSPADM

## 2019-12-03 RX ORDER — EPHEDRINE SULFATE 50 MG/ML
5 INJECTION, SOLUTION INTRAVENOUS ONCE AS NEEDED
Status: DISCONTINUED | OUTPATIENT
Start: 2019-12-03 | End: 2019-12-03 | Stop reason: HOSPADM

## 2019-12-03 RX ORDER — ACETAMINOPHEN 650 MG/1
650 SUPPOSITORY RECTAL ONCE AS NEEDED
Status: DISCONTINUED | OUTPATIENT
Start: 2019-12-03 | End: 2019-12-03 | Stop reason: HOSPADM

## 2019-12-03 RX ORDER — BUPIVACAINE HCL/0.9 % NACL/PF 0.1 %
2 PLASTIC BAG, INJECTION (ML) EPIDURAL ONCE
Status: COMPLETED | OUTPATIENT
Start: 2019-12-03 | End: 2019-12-03

## 2019-12-03 RX ORDER — NEOSTIGMINE METHYLSULFATE 4 MG/4 ML
SYRINGE (ML) INTRAVENOUS AS NEEDED
Status: DISCONTINUED | OUTPATIENT
Start: 2019-12-03 | End: 2019-12-03 | Stop reason: SURG

## 2019-12-03 RX ORDER — DEXAMETHASONE SODIUM PHOSPHATE 4 MG/ML
INJECTION, SOLUTION INTRA-ARTICULAR; INTRALESIONAL; INTRAMUSCULAR; INTRAVENOUS; SOFT TISSUE AS NEEDED
Status: DISCONTINUED | OUTPATIENT
Start: 2019-12-03 | End: 2019-12-03 | Stop reason: SURG

## 2019-12-03 RX ORDER — ONDANSETRON 2 MG/ML
4 INJECTION INTRAMUSCULAR; INTRAVENOUS ONCE AS NEEDED
Status: DISCONTINUED | OUTPATIENT
Start: 2019-12-03 | End: 2019-12-03 | Stop reason: HOSPADM

## 2019-12-03 RX ORDER — ACETAMINOPHEN 325 MG/1
650 TABLET ORAL ONCE AS NEEDED
Status: DISCONTINUED | OUTPATIENT
Start: 2019-12-03 | End: 2019-12-03 | Stop reason: HOSPADM

## 2019-12-03 RX ORDER — FLUMAZENIL 0.1 MG/ML
0.2 INJECTION INTRAVENOUS AS NEEDED
Status: DISCONTINUED | OUTPATIENT
Start: 2019-12-03 | End: 2019-12-03 | Stop reason: HOSPADM

## 2019-12-03 RX ORDER — MIDAZOLAM HYDROCHLORIDE 1 MG/ML
INJECTION INTRAMUSCULAR; INTRAVENOUS AS NEEDED
Status: DISCONTINUED | OUTPATIENT
Start: 2019-12-03 | End: 2019-12-03 | Stop reason: SURG

## 2019-12-03 RX ORDER — LABETALOL HYDROCHLORIDE 5 MG/ML
5 INJECTION, SOLUTION INTRAVENOUS
Status: DISCONTINUED | OUTPATIENT
Start: 2019-12-03 | End: 2019-12-03 | Stop reason: HOSPADM

## 2019-12-03 RX ORDER — PROPOFOL 10 MG/ML
VIAL (ML) INTRAVENOUS AS NEEDED
Status: DISCONTINUED | OUTPATIENT
Start: 2019-12-03 | End: 2019-12-03 | Stop reason: SURG

## 2019-12-03 RX ORDER — LIDOCAINE HYDROCHLORIDE 20 MG/ML
INJECTION, SOLUTION INFILTRATION; PERINEURAL AS NEEDED
Status: DISCONTINUED | OUTPATIENT
Start: 2019-12-03 | End: 2019-12-03 | Stop reason: SURG

## 2019-12-03 RX ORDER — PROMETHAZINE HYDROCHLORIDE 25 MG/1
25 TABLET ORAL ONCE AS NEEDED
Status: DISCONTINUED | OUTPATIENT
Start: 2019-12-03 | End: 2019-12-03 | Stop reason: HOSPADM

## 2019-12-03 RX ADMIN — LIDOCAINE HYDROCHLORIDE 100 MG: 20 INJECTION, SOLUTION INFILTRATION; PERINEURAL at 09:22

## 2019-12-03 RX ADMIN — SODIUM CHLORIDE 50 ML/HR: 900 INJECTION, SOLUTION INTRAVENOUS at 08:02

## 2019-12-03 RX ADMIN — EPHEDRINE SULFATE 10 MG: 50 INJECTION INTRAVENOUS at 09:31

## 2019-12-03 RX ADMIN — EPHEDRINE SULFATE 10 MG: 50 INJECTION INTRAVENOUS at 09:38

## 2019-12-03 RX ADMIN — MIDAZOLAM HYDROCHLORIDE 2 MG: 2 INJECTION, SOLUTION INTRAMUSCULAR; INTRAVENOUS at 09:07

## 2019-12-03 RX ADMIN — DEXAMETHASONE SODIUM PHOSPHATE 4 MG: 4 INJECTION, SOLUTION INTRAMUSCULAR; INTRAVENOUS at 09:55

## 2019-12-03 RX ADMIN — ONDANSETRON 4 MG: 2 INJECTION INTRAMUSCULAR; INTRAVENOUS at 10:09

## 2019-12-03 RX ADMIN — EPHEDRINE SULFATE 10 MG: 50 INJECTION INTRAVENOUS at 09:35

## 2019-12-03 RX ADMIN — PROPOFOL 130 MG: 10 INJECTION, EMULSION INTRAVENOUS at 09:22

## 2019-12-03 RX ADMIN — SODIUM CHLORIDE: 900 INJECTION, SOLUTION INTRAVENOUS at 09:09

## 2019-12-03 RX ADMIN — SUCCINYLCHOLINE CHLORIDE 140 MG: 20 INJECTION, SOLUTION INTRAMUSCULAR; INTRAVENOUS at 09:22

## 2019-12-03 RX ADMIN — FENTANYL CITRATE 50 MCG: 50 INJECTION, SOLUTION INTRAMUSCULAR; INTRAVENOUS at 09:09

## 2019-12-03 RX ADMIN — Medication 2 MG: at 10:09

## 2019-12-03 RX ADMIN — ROCURONIUM BROMIDE 30 MG: 10 INJECTION INTRAVENOUS at 09:28

## 2019-12-03 RX ADMIN — PROPOFOL 50 MG: 10 INJECTION, EMULSION INTRAVENOUS at 09:59

## 2019-12-03 RX ADMIN — Medication 2 G: at 09:23

## 2019-12-03 RX ADMIN — GLYCOPYRROLATE 0.4 MG: 0.2 INJECTION, SOLUTION INTRAMUSCULAR; INTRAVITREAL at 10:09

## 2019-12-03 NOTE — ANESTHESIA POSTPROCEDURE EVALUATION
Patient: Jamil Olivo    Procedure Summary     Date:  12/03/19 Room / Location:  [facility] OR 03 / [facility] OR    Anesthesia Start:  0909 Anesthesia Stop:  1030    Procedure:  INSERTION PERITONEAL DIALYSIS CATHETER LAPAROSCOPIC (N/A Abdomen) Diagnosis:       Stage 5 chronic kidney disease on chronic dialysis (CMS/HCC)      (Stage 5 chronic kidney disease on chronic dialysis (CMS/HCC) [N18.6, Z99.2])    Surgeon:  Valerio Webster MD Provider:  Christian Gallardo MD    Anesthesia Type:  general ASA Status:  4          Anesthesia Type: general  Last vitals  BP   135/74 (12/03/19 1026)   Temp   96.9 °F (36.1 °C) (12/03/19 1026)   Pulse   64 (12/03/19 1026)   Resp   18 (12/03/19 1026)     SpO2   96 % (12/03/19 1026)     Post Anesthesia Care and Evaluation    Patient location during evaluation: PACU  Patient participation: waiting for patient participation  Level of consciousness: sleepy but conscious  Pain score: 0  Pain management: adequate  Airway patency: patent  Anesthetic complications: No anesthetic complications  PONV Status: none  Cardiovascular status: acceptable  Respiratory status: acceptable and oral airway  Hydration status: acceptable  Post Neuraxial Block status: Motor and sensory function returned to baseline  Comments: Pt sedated and intubated

## 2019-12-03 NOTE — DISCHARGE INSTRUCTIONS
Dr. Valerio Webster  [address] [place] [address]  [phone] (office)  [phone] (hospital)  [phone] (cell)  Discharge Instructions for placement of peritoneal dialysis catheter      1. Go home, rest and take it easy today; however, you should get up and move about several times today to reduce the risk of developing a clot in your legs.    2. You may experience some dizziness or memory loss from the anesthesia.  This may last for the next 24 hours.  Someone should plan on staying with you for the first 24 hours for your safety.  3. Do not make any important legal decisions or sign any legal papers for the next 24 hours.    4. Eat and drink lightly today.  Start off with liquids, jello, soup, crackers or other bland foods at first. You may advance your diet tomorrow as tolerated as long as you do not experience any nausea or vomiting.   5. You may remove your outer dressings in 3 days.  The white tapes called steri-strips should stay in place.  They will fall off on their own in 1-2 weeks.  Do not worry if they come off sooner.    6. You may notice some bleeding/drainage on your outer dressings. A little bloody drainage is normal. If the bleeding/drainage is such that the bandage cannot absorb it, remove the dressing, apply clean gauze and apply firm pressure for a full 15 minutes.  If the bleeding continues, please call me.  7. You may shower tomorrow.  No tub baths for at least 1 week until your incisions are completely healed.       8. You have received a prescription for a narcotic pain medicine, as you will have some pain following surgery.   You will not be totally pain free, but your pain medicine should make the pain tolerable.  Please take your pain medicine as prescribed and always take your pills with food to prevent nausea. If you are having severe pain that cannot be controlled by the pain medicine, please contact me.    9. If needed, you may receive a prescription for an anti-nausea  medicine.  Please take this as prescribed for any nausea or vomiting.  Nausea could be a result of the anesthesia or a result of the narcotic pain medicine.  If you experience severe nausea and vomiting that cannot be controlled by the nausea medicine, please call me.    10. It is not unusual to experience pain/discomfort in your shoulders or under your ribs after surgery.  It is from the gas used during the laparoscopic procedure and usually lasts 1-3 days.  The prescription pain medicine is used to treat the surgical pain and does not typically alleviate this “gassy” pain.   11. No driving for 24 hours and for as long as you are taking your prescription pain medicine.    12. Since you have a DRAIN(S):  · Sponge bath only.  Leave dressing intact around the drain.  13. If an appointment is not given to you before discharge, you will need to call the office       at [phone] to schedule a follow-up appointment in 5-7 days.   14. Remember to contact me for any of the following:    • Fever > 101 degrees  • Severe pain that cannot be controlled by taking your pain pills  • Severe nausea or vomiting that cannot be controlled by taking your nausea pills  • Significant bleeding of your incisions  • Drainage that has a bad smell or is yellow or green in appearance  • Any other questions or concerns

## 2019-12-03 NOTE — OP NOTE
INSERTION PERITONEAL DIALYSIS CATHETER LAPAROSCOPIC  Procedure Note    Jamil Olivo  12/3/2019    Pre-op Diagnosis:   Stage 5 chronic kidney disease on chronic dialysis (CMS/HCC) [N18.6, Z99.2]    Post-op Diagnosis:     Post-Op Diagnosis Codes:     * Stage 5 chronic kidney disease on chronic dialysis (CMS/HCC) [N18.6, Z99.2]    Procedure:  INSERTION PERITONEAL DIALYSIS CATHETER (LAPAROSCOPIC)    Surgeon(s):  Valerio Webster MD    Anesthesia: General    Staff:   Circulator: Eliz Barreto RN; Zulay Jacques RN  Scrub Person: Maira Lester; Amanda Alberto Technician: Lea Cross CST  Assistant: Claire Richardson CSA    Estimated Blood Loss: minimal    Specimens:                None      Drains: Peritoneal dialysis catheter    Findings: None    Complications: None    Indications: 54 year old with renal failure. Considered for PD catheter placement for dialysis.    Description of procedure: The patient is brought to the operating room and placed supine on the operating table.  Adequate general endotracheal anesthesia was induced.  In and out catheter was performed.  The abdominal area is prepped and draped in a sterile manner. Briefing and timeout were performed and all parties were in agreement.    An incision is made transversely slightly to the left of midline in the subcostal epigastric area. The peritoneal cavity was entered under direct vision using a  5 mm atraumatic XCEL trocar with no visceral injury.  The abdomen is insufflated to a total of 15 mmHg, 4.1 L of CO2.  A 5 mm laparoscope reveals an excellent view of the abdominal cavity. There were a few omental adhesions to the anterior abdominal wall that were left intact.  A longitudinal 5 mm incision is made slightly above the umbilicus and a second 5 mm trocar is placed under direct vision with no visceral injury. A transverse incision is made to the left of the umbilicus.  An 8 mm trocar is passed through the fascia and tunneled  beneath the rectus muscle inferiorly, entering the abdomen several centimeters below the skin incision.  A coiled peritoneal dialysis catheter is passed through the 8 mm trocar and placed into the pelvis.  The distal grommet of the catheter is brought into the trocar and in the trocar is removed, positioning the distal grommet 5 cm below the 8 mm skin incision below the rectus muscle.  The catheter is then tunneled subcutaneously and brought out through a small incision lateral to the 8 mm trocar site, leaving it in a gentle curve in the subcutaneous tissue.  This left the proximal  grommet above and deep to the 8 mm trocar site.  Excellent flow and spontaneous drainage of dialysis fluid is achieved.      All skin incisions were brought together with continuous 4-0 subcuticular Vicryl suture.  Procedure is terminated.  The patient tolerated it well; sponge and needle counts were correct, and the patient was returned to recovery in satisfactory condition.                This document has been electronically signed by Valerio Webster MD on December 3, 2019 10:22 AM      Date: 12/3/2019  Time: 10:22 AM

## 2019-12-03 NOTE — INTERVAL H&P NOTE
H&P reviewed. The patient was examined and there are no changes to the H&P.      Temp:  [97.3 °F (36.3 °C)] 97.3 °F (36.3 °C)  Heart Rate:  [62] 62  Resp:  [16] 16  BP: (176)/(86) 176/86

## 2019-12-03 NOTE — NURSING NOTE
Patient is very sleepy, dozing during dc instructions. Wife at bedside. Instructed to call when patient is ready to get up and get dressed for discharge.

## 2019-12-03 NOTE — ANESTHESIA PROCEDURE NOTES
Airway  Urgency: elective    Date/Time: 12/3/2019 9:22 AM  Airway not difficult    General Information and Staff    Patient location during procedure: OR  CRNA: Ruby Zeng CRNA    Indications and Patient Condition  Indications for airway management: airway protection    Preoxygenated: yes  Mask difficulty assessment: 2 - vent by mask + OA or adjuvant +/- NMBA    Final Airway Details  Final airway type: endotracheal airway      Successful airway: ETT  Cuffed: yes   Successful intubation technique: direct laryngoscopy  Facilitating devices/methods: intubating stylet  Blade: Snow  Blade size: 3  ETT size (mm): 7.5  Cormack-Lehane Classification: grade I - full view of glottis  Placement verified by: chest auscultation and capnometry   Measured from: lips  ETT/EBT  to lips (cm): 20  Number of attempts at approach: 1

## 2019-12-04 ENCOUNTER — HOSPITAL ENCOUNTER (EMERGENCY)
Facility: HOSPITAL | Age: 54
Discharge: LEFT BEFORE DISCHARGE | End: 2019-12-04
Attending: FAMILY MEDICINE | Admitting: FAMILY MEDICINE

## 2019-12-04 VITALS
BODY MASS INDEX: 32.55 KG/M2 | DIASTOLIC BLOOD PRESSURE: 110 MMHG | HEART RATE: 83 BPM | OXYGEN SATURATION: 93 % | WEIGHT: 232.5 LBS | SYSTOLIC BLOOD PRESSURE: 192 MMHG | RESPIRATION RATE: 20 BRPM | TEMPERATURE: 97.8 F | HEIGHT: 71 IN

## 2019-12-04 DIAGNOSIS — R39.198 DIFFICULTY URINATING: Primary | ICD-10-CM

## 2019-12-04 LAB — GLUCOSE BLDC GLUCOMTR-MCNC: 84 MG/DL (ref 70–130)

## 2019-12-04 PROCEDURE — 99281 EMR DPT VST MAYX REQ PHY/QHP: CPT

## 2019-12-04 PROCEDURE — 51798 US URINE CAPACITY MEASURE: CPT

## 2019-12-04 RX ORDER — DOCUSATE SODIUM 100 MG/1
100 CAPSULE, LIQUID FILLED ORAL 2 TIMES DAILY
Qty: 60 CAPSULE | Refills: 1 | Status: SHIPPED | OUTPATIENT
Start: 2019-12-04 | End: 2020-11-03

## 2019-12-04 RX ORDER — SODIUM CHLORIDE 0.9 % (FLUSH) 0.9 %
10 SYRINGE (ML) INJECTION AS NEEDED
Status: DISCONTINUED | OUTPATIENT
Start: 2019-12-04 | End: 2019-12-04 | Stop reason: HOSPADM

## 2019-12-04 NOTE — ED PROVIDER NOTES
Subjective   Patient presents to emergency department for difficulty urinating since today.  States he had peritoneal dialysis catheter placed yesterday by Dr. Webster.  Denies any fever/chills/suprapubic distention or tenderness.        History provided by:  Patient   used: No    Difficulty Urinating   Presenting symptoms: no dysuria    Presenting symptoms comment:  Difficulty urinating  Associated symptoms: no abdominal pain, no fever, no flank pain, no nausea and no vomiting        Review of Systems   Constitutional: Negative for chills and fever.   HENT: Negative for sore throat and trouble swallowing.    Eyes: Negative for visual disturbance.   Respiratory: Negative for shortness of breath and wheezing.    Cardiovascular: Negative for chest pain.   Gastrointestinal: Negative for abdominal pain, nausea and vomiting.   Genitourinary: Positive for difficulty urinating. Negative for dysuria and flank pain.   Musculoskeletal: Negative for back pain.   Skin: Negative for color change.   Allergic/Immunologic: Negative for immunocompromised state.   Neurological: Negative for syncope and weakness.   Hematological: Does not bruise/bleed easily.   Psychiatric/Behavioral: Negative for confusion.       Past Medical History:   Diagnosis Date   • A-fib (CMS/[facility])    • Asthma    • CAD (coronary artery disease) 11/19/2018   • CHF (congestive heart failure) (CMS/[facility])    • Diabetes mellitus (CMS/[facility])    • Diabetic neuropathy (CMS/[facility]) 2014   • Diabetic neuropathy (CMS/[facility])    • Dialysis patient (CMS/[facility])    • Hypertension    • Kidney disease 2018    pt states that he is close to needing to be on dialysis   • Presence of combination internal cardiac defibrillator (ICD) and pacemaker    • Stage 5 chronic kidney disease on chronic dialysis (CMS/[facility]) 11/26/2019    Added automatically from request for surgery [id number]       Allergies   Allergen Reactions   • Motrin [Ibuprofen] Hives   • Tape Other (See Comments)      "Takes skin off       Past Surgical History:   Procedure Laterality Date   • ARTERIOVENOUS FISTULA/SHUNT SURGERY Left 4/24/2019    Procedure: radial artery to cephalic vein ARTERIOVENOUS FISTULA;  Surgeon: Kip Hoang MD;  Location: [facility] OR;  Service: Vascular   • CARDIAC CATHETERIZATION N/A 11/16/2018    Procedure: Left Heart Cath;  Surgeon: Robin Yi MD;  Location: [facility] CATH INVASIVE LOCATION;  Service: Cardiology   • CARDIAC ELECTROPHYSIOLOGY PROCEDURE N/A 11/20/2018    Procedure: ICD SC new;  Surgeon: Garcia Haque MD;  Location: [facility] CATH INVASIVE LOCATION;  Service: Cardiology   • CATARACT EXTRACTION Bilateral    • EYE SURGERY Right    • INTERVENTIONAL RADIOLOGY PROCEDURE N/A 11/19/2018    Procedure: tunneled central venous catheter placement;  Surgeon: Kip Hoang MD;  Location: [facility] ANGIO INVASIVE LOCATION;  Service: Interventional Radiology   • PACEMAKER IMPLANTATION         Family History   Problem Relation Age of Onset   • Hypertension Mother    • Diabetes Mother    • Hypertension Father    • Diabetes Father    • Asthma Maternal Grandfather        Social History     Socioeconomic History   • Marital status:      Spouse name: Not on file   • Number of children: Not on file   • Years of education: Not on file   • Highest education level: Not on file   Tobacco Use   • Smoking status: Never Smoker   • Smokeless tobacco: Never Used   Substance and Sexual Activity   • Alcohol use: No   • Drug use: No   • Sexual activity: Defer           Objective      BP (!) 192/110 (BP Location: Right arm, Patient Position: Sitting) Comment: Pt stated he has high BP and has not taken his medication yet today   Pulse 83   Temp 97.8 °F (36.6 °C) (Oral)   Resp 20   Ht 180.3 cm (71\")   Wt 105 kg (232 lb 8 oz)   SpO2 93%   BMI 32.43 kg/m²     Physical Exam   Constitutional: He is oriented to person, place, and time. He appears well-developed and well-nourished. No distress. "   HENT:   Head: Normocephalic and atraumatic.   Eyes: Conjunctivae are normal.   Cardiovascular: Normal rate, regular rhythm, normal heart sounds and intact distal pulses.   Pulmonary/Chest: Effort normal and breath sounds normal.   Abdominal: Soft. Bowel sounds are normal. There is tenderness.   No suprapubic tenderness or distention noted.   Musculoskeletal: He exhibits no edema.   Neurological: He is alert and oriented to person, place, and time.   Skin: Skin is warm and dry. Capillary refill takes less than 2 seconds.   Psychiatric: He has a normal mood and affect. His behavior is normal. Thought content normal.   Nursing note and vitals reviewed.      Procedures           ED Course                  MDM  Number of Diagnoses or Management Options  Difficulty urinating:   Diagnosis management comments: Patient was extremely hypertensive but had not taken his hypertension medications this morning.  Bladder scan within normal limits.  I ordered additional testing and was going to control his blood pressure.  Patient left AMA and signed paperwork with nursing staff.  I was not afforded the opportunity to discussed risks of leaving AGAINST MEDICAL ADVICE with patient.      Final diagnoses:   Difficulty urinating              Sergo De Oliveira PA-C  12/04/19 1328

## 2019-12-13 ENCOUNTER — OFFICE VISIT (OUTPATIENT)
Dept: SURGERY | Facility: CLINIC | Age: 54
End: 2019-12-13

## 2019-12-13 VITALS
DIASTOLIC BLOOD PRESSURE: 90 MMHG | HEIGHT: 71 IN | SYSTOLIC BLOOD PRESSURE: 140 MMHG | WEIGHT: 227.8 LBS | HEART RATE: 92 BPM | TEMPERATURE: 98.2 F | BODY MASS INDEX: 31.89 KG/M2

## 2019-12-13 DIAGNOSIS — Z99.2 ESRD (END STAGE RENAL DISEASE) ON DIALYSIS (HCC): Primary | ICD-10-CM

## 2019-12-13 DIAGNOSIS — N18.6 ESRD (END STAGE RENAL DISEASE) ON DIALYSIS (HCC): Primary | ICD-10-CM

## 2019-12-13 PROCEDURE — 99024 POSTOP FOLLOW-UP VISIT: CPT | Performed by: SURGERY

## 2019-12-13 NOTE — PATIENT INSTRUCTIONS

## 2019-12-15 NOTE — PROGRESS NOTES
CHIEF COMPLAINT:   Chief Complaint   Patient presents with   • Post-op Follow-up     post operative laparoscopic peritoneal dialysis catheter 12-3-19       HPI: This patient is seen for a post-operatively following laparoscopic placement of a peritoneal dialysis catheter.  Patient  is doing well. Eating well without any significant nausea. Having good bowel function. No problems with constipation or diarrhea. No urinary complaints. Denies fever. Ambulating well and slowly returning to normal activities.    PHYSICAL EXAM:    ABD: Incisions are healing well without any erythema or signs of infection.    RONALDO Negron was seen today for post-op follow-up.    Diagnoses and all orders for this visit:    ESRD (end stage renal disease) on dialysis (CMS/[facility])        PLAN:    1. The patient will follow-up on a prn basis unless there are any problems.  2. May shower.   3. Gradually return to normal activity without restrictions.          This document has been electronically signed by Valerio Webster MD on December 15, 2019 4:06 PM

## 2020-10-27 ENCOUNTER — OFFICE VISIT (OUTPATIENT)
Dept: CARDIAC SURGERY | Facility: CLINIC | Age: 55
End: 2020-10-27

## 2020-10-27 VITALS
WEIGHT: 244 LBS | DIASTOLIC BLOOD PRESSURE: 86 MMHG | OXYGEN SATURATION: 97 % | BODY MASS INDEX: 34.16 KG/M2 | HEIGHT: 71 IN | HEART RATE: 88 BPM | SYSTOLIC BLOOD PRESSURE: 139 MMHG

## 2020-10-27 DIAGNOSIS — I73.9 PVD (PERIPHERAL VASCULAR DISEASE) WITH CLAUDICATION (HCC): ICD-10-CM

## 2020-10-27 DIAGNOSIS — N18.6 ESRF (END STAGE RENAL FAILURE) (HCC): Chronic | ICD-10-CM

## 2020-10-27 DIAGNOSIS — I77.0 A-V FISTULA (HCC): Primary | ICD-10-CM

## 2020-10-27 PROCEDURE — 99213 OFFICE O/P EST LOW 20 MIN: CPT | Performed by: NURSE PRACTITIONER

## 2020-11-03 PROBLEM — I73.9 PVD (PERIPHERAL VASCULAR DISEASE) WITH CLAUDICATION: Status: ACTIVE | Noted: 2020-11-03

## 2020-11-03 NOTE — PROGRESS NOTES
[facility]    Progress Note    Ronni Rojas Patient Status:      2018 MRN [id number]   [facility] 2SW-N Attending Junior Suresh MD   Hosp Day # 6 days   GA at birth: Gestational Age: 31w0d   Corrected GA:34w 6d CVTS Office Progress Note       Subjective   Patient ID: Jamil Olivo is a 54 y.o. male is here today for follow-up.    Chief Complaint:    Chief Complaint   Patient presents with   • Hemodialysis Access       The following portions of the patient's history were reviewed and updated as appropriate: allergies, current medications, past family history, past medical history, past social history, past surgical history and problem list.  Recent images independently reviewed.  Available laboratory values reviewed.  [facility] in [place]    PCP:  John Hyde MD  Cardiology:  Kd  Nephrology: [facility]    54 y.o. male with HTN(stable, increased risk stroke, rupture), Hyperlipidemia(stable, increased risk cardiovascular events), Diabetes Mellitus(stable, increased risk cardiovascular events) and Chronic Kidney Disease(stable, increased risk renal failure) .  never smoked.  ESRD on hemodialysis since 11/2018. Requires long term AV access for hemodialysis.  Lost of fistula surveillance recently, re-establishes today following referral from PCP for peripheral neuropathy and potential concern for vascular etiology as it relates to his pain.  No other associated signs, symptoms or modifying factors.    4/2019 Upper extremity vein mapping:  RIGHT cephalic 1.1-4.3mm, basilic 1.4-2.6mm.  LEFT cephalic 2.6-5.2mm.  Distal radial 2.0mm.  High bifurcation brachial artery prox mid humerus.    3/2017 Echocardiogram:  EF 15% LA 54mm, LV 70mm, RVSP 52mmHg.  Mild TR.  Mild-moderate MR.  8/2017 Stress Test:  EF 20%, moderate to severe inferolateral basal ischemia.  Moderate inferior scar.  11/2018 Cardiac Catheterization:  LAD mild irregularities, %, %.  EF 20%.  11/2018 ICD placement (LEFT SCV)  11/19/18: Place tunnelled central venous catheter   4/24/19: LEFT Radial-Cephalic AV Fistula Placement  6/12/19: LEFT fistula duplex: Patent. mPSV 487cm/s. Size 2.6-7.6mm. Flows 531-2733ml/m  7/22/19: LEFT fistula duplex:  Patent. mPSV  379cms. Size 1.7-6.5mm. Flows 212-1248ml/m.  9/6/19 Left Fistula Duplex: Patent.  mPSV 446cm/s. Size 4.6-7.2mm.  Flows 933-2900ml/min  10/2020 JERAMIE: 1.08 triphasic.  Left 1.39 triphasic.     Past Medical History:   Diagnosis Date   • A-fib (CMS/[facility])    • Asthma    • CAD (coronary artery disease) 11/19/2018   • CHF (congestive heart failure) (CMS/[facility])    • Diabetes mellitus (CMS/[facility])    • Diabetic neuropathy (CMS/[facility]) 2014   • Diabetic neuropathy (CMS/[facility])    • Dialysis patient (CMS/[facility])    • Hypertension    • Kidney disease 2018    pt states that he is close to needing to be on dialysis   • Presence of combination internal cardiac defibrillator (ICD) and pacemaker    • Stage 5 chronic kidney disease on chronic dialysis (CMS/[facility]) 11/26/2019    Added automatically from request for surgery [id number]     Past Surgical History:   Procedure Laterality Date   • ARTERIOVENOUS FISTULA/SHUNT SURGERY Left 4/24/2019    Procedure: radial artery to cephalic vein ARTERIOVENOUS FISTULA;  Surgeon: Kip Hoang MD;  Location: [facility] OR;  Service: Vascular   • CARDIAC CATHETERIZATION N/A 11/16/2018    Procedure: Left Heart Cath;  Surgeon: Robin Yi MD;  Location: [facility] CATH INVASIVE LOCATION;  Service: Cardiology   • CARDIAC ELECTROPHYSIOLOGY PROCEDURE N/A 11/20/2018    Procedure: ICD SC new;  Surgeon: Garcia Haque MD;  Location: [facility] CATH INVASIVE LOCATION;  Service: Cardiology   • CATARACT EXTRACTION Bilateral    • EYE SURGERY Right    • INSERTION PERITONEAL DIALYSIS CATHETER N/A 12/3/2019    Procedure: INSERTION PERITONEAL DIALYSIS CATHETER LAPAROSCOPIC;  Surgeon: Valerio Webster MD;  Location: [facility] OR;  Service: General   • INTERVENTIONAL RADIOLOGY PROCEDURE N/A 11/19/2018    Procedure: tunneled central venous catheter placement;  Surgeon: Kip Hoang MD;  Location: [facility] ANGIO INVASIVE LOCATION;  Service: Interventional Radiology   • PACEMAKER IMPLANTATION       Family History  discharge  4) Carseat challenge: needs before discharge  5) Immunizations: There is no immunization history on file for this patient.      Breech delivery, so recommend outpatient hip US 4-6 weeks after discharge          Objective:    Ronni Rojas is radhan   Problem Relation Age of Onset   • Hypertension Mother    • Diabetes Mother    • Hypertension Father    • Diabetes Father    • Asthma Maternal Grandfather      Social History     Tobacco Use   • Smoking status: Never Smoker   • Smokeless tobacco: Never Used   Substance Use Topics   • Alcohol use: No   • Drug use: No       ALLERGIES:   Motrin [ibuprofen] and Tape    MEDICATIONS:    Current Outpatient Medications:   •  albuterol (PROVENTIL HFA;VENTOLIN HFA) 108 (90 BASE) MCG/ACT inhaler, Inhale 2 puffs Every 4 (Four) Hours As Needed for Wheezing., Disp: 1 inhaler, Rfl: 1  •  amiodarone (PACERONE) 200 MG tablet, Take 200 mg by mouth Daily., Disp: , Rfl: 12  •  calcitriol (ROCALTROL) 0.25 MCG capsule, Take 0.25 mcg by mouth 3 (Three) Times a Day., Disp: , Rfl:   •  Cholecalciferol (VITAMIN D3) 29548 units capsule, Take 1 capsule by mouth Every 7 (Seven) Days., Disp: 4 capsule, Rfl: 1  •  clopidogrel (PLAVIX) 75 MG tablet, Take 1 tablet by mouth Daily., Disp: 30 tablet, Rfl: 1  •  Ferric Citrate (AURYXIA PO), Take  by mouth 3 (Three) Times a Day., Disp: , Rfl:   •  furosemide (LASIX) 40 MG tablet, Take 40 mg by mouth 2 (Two) Times a Day., Disp: , Rfl:   •  insulin glargine (LANTUS) 100 UNIT/ML injection, Inject 30 Units under the skin into the appropriate area as directed Every Night., Disp: , Rfl:   •  lisinopril (PRINIVIL,ZESTRIL) 10 MG tablet, Take 1 tablet by mouth Every Night., Disp: 30 tablet, Rfl: 1  •  metoprolol tartrate (LOPRESSOR) 50 MG tablet, Take 50 mg by mouth 2 (Two) Times a Day. One takes one tab on day of dialysis, Disp: , Rfl: 12  •  docusate sodium (COLACE) 100 MG capsule, Take 1 capsule by mouth 2 (Two) Times a Day., Disp: 60 capsule, Rfl: 1  •  HYDROcodone-acetaminophen (NORCO) 7.5-325 MG per tablet, Take 1 tablet by mouth Every 6 (Six) Hours As Needed for Moderate Pain  or Severe Pain  (Pain)., Disp: 12 tablet, Rfl: 0  •  sevelamer (RENAGEL) 800 MG tablet, Take 2 tablets by mouth 3 (Three) Times a  Goal:    Plan:    Access/Lines:    Imaging:    Current medications:    Current Facility-Administered Medications Ordered in Epic:  Zinc Oxide (DESITIN) 40 % ointment  Topical PRN Lynette Riley MD    multivitamin (POLY-VI-SOL) oral solution (PEDS) 0. Day With Meals., Disp: 180 tablet, Rfl: 1    Current Facility-Administered Medications:   •  lidocaine-prilocaine (EMLA) 2.5-2.5 % cream, , Topical, PRN, Alyssa Rutherford, NICK    Review of Systems   Cardiovascular:        Fistula bleeding (N). Fistula Pain (N). Extremity Pain (N). Extremity Discoloration (N)     Hematologic/Lymphatic: Negative for bleeding problem.   Skin: Negative for color change and nail changes.   Neurological: Negative for numbness and paresthesias.   All other systems reviewed and are negative.       Objective       Vitals:    10/27/20 1554   BP: 139/86   Pulse: 88   SpO2: 97%      Body mass index is 34.03 kg/m².  Physical Exam   Constitutional: He is oriented to person, place, and time.   HENT:   Head: Atraumatic.   Cardiovascular: Normal rate.   Pulses:       Radial pulses are 2+ on the left side.   Fistula Present LEFT Extremity: (+)thrill/(+)bruit/(+)pulse. Aneurysmal (N). Water Hammer Pulse (N). Thinning (N).  Hilario's Test <3sec (Y).  Skin warm/dry/pink/intact (Y). Radial Pulse (Y).     Pulmonary/Chest: Effort normal and breath sounds normal.   Abdominal: Soft.   Musculoskeletal: Normal range of motion.      Comments: Gait normal   Neurological: He is alert and oriented to person, place, and time. No cranial nerve deficit.   Skin: Skin is warm and dry. Capillary refill takes less than 2 seconds.   No venous staining   Psychiatric: Thought content normal.   Vitals reviewed.        Assessment & Plan     Independent Review of Radiographic Studies:    10/2020 JERAMIE: 1.08 triphasic.  Left 1.39 triphasic.     1. A-V fistula (CMS/HCC)  Lost of fistula surveillance.   Obtain duplex in 3 months  No current issues with running dialysis    - Doppler Ankle Brachial Index Single Level CAR; Future  - Duplex Hemodialysis Access CAR    2. PVD (peripheral vascular disease) with claudication (CMS/HCC)  Normal resting perfusion on today's JERAMIE.  Vascular insult does not appear to be the cause of his  neuropathy  Consider other etiologies with PCP  BB, ACE, Plavix    Repeat JERAMIE on as needed basis.    3. ESRF (end stage renal failure) (CMS/[facility])  Continue dialysis as scheduled via AVF TTS                          This document has been electronically signed by NICK Fisher on November 3, 2020 11:35 CST

## 2021-01-19 ENCOUNTER — TELEPHONE (OUTPATIENT)
Dept: CARDIOLOGY | Facility: CLINIC | Age: 56
End: 2021-01-19

## 2021-01-19 NOTE — TELEPHONE ENCOUNTER
Spoke with patient to see what Dr poncho follows for his ICD to be checked. He stated he sees Dr Yi. Will release his home monitor to be followed by his clinic.

## 2021-02-02 ENCOUNTER — OFFICE VISIT (OUTPATIENT)
Dept: CARDIAC SURGERY | Facility: CLINIC | Age: 56
End: 2021-02-02

## 2021-02-02 VITALS
SYSTOLIC BLOOD PRESSURE: 140 MMHG | DIASTOLIC BLOOD PRESSURE: 86 MMHG | BODY MASS INDEX: 34.3 KG/M2 | HEIGHT: 71 IN | WEIGHT: 245 LBS | OXYGEN SATURATION: 96 % | HEART RATE: 80 BPM

## 2021-02-02 DIAGNOSIS — N18.6 ESRD (END STAGE RENAL DISEASE) ON DIALYSIS (HCC): ICD-10-CM

## 2021-02-02 DIAGNOSIS — Z99.2 ESRD (END STAGE RENAL DISEASE) ON DIALYSIS (HCC): ICD-10-CM

## 2021-02-02 DIAGNOSIS — I77.0 A-V FISTULA (HCC): Primary | ICD-10-CM

## 2021-02-02 PROCEDURE — 99213 OFFICE O/P EST LOW 20 MIN: CPT | Performed by: NURSE PRACTITIONER

## 2021-02-03 NOTE — PROGRESS NOTES
CVTS Office Progress Note       Subjective   Patient ID: Jamil Olivo is a 55 y.o. male is here today for follow-up.    Chief Complaint:    Chief Complaint   Patient presents with   • Hemodialysis Access       The following portions of the patient's history were reviewed and updated as appropriate: allergies, current medications, past family history, past medical history, past social history, past surgical history and problem list.  Recent images independently reviewed.  Available laboratory values reviewed.  HPI    PCP:  John Hyde MD  Cardiology:  K[facility]  Nephrology: [facility], on peritoneal dialysis    55 y.o. male with HTN(stable, increased risk stroke, rupture), Hyperlipidemia(stable, increased risk cardiovascular events), Diabetes Mellitus(stable, increased risk cardiovascular events) and Chronic Kidney Disease(stable, increased risk renal failure) .  never smoked.  ESRD on hemodialysis since 11/2018. Requires long term AV access for hemodialysis.  Recent transition to peritoneal dialysis, no longer using AVF, also on kidney transplant list.  No other associated signs, symptoms or modifying factors.    4/2019 Upper extremity vein mapping:  RIGHT cephalic 1.1-4.3mm, basilic 1.4-2.6mm.  LEFT cephalic 2.6-5.2mm.  Distal radial 2.0mm.  High bifurcation brachial artery prox mid humerus.    3/2017 Echocardiogram:  EF 15% LA 54mm, LV 70mm, RVSP 52mmHg.  Mild TR.  Mild-moderate MR.  8/2017 Stress Test:  EF 20%, moderate to severe inferolateral basal ischemia.  Moderate inferior scar.  11/2018 Cardiac Catheterization:  LAD mild irregularities, %, %.  EF 20%.  11/2018 ICD placement (LEFT SCV)  11/19/18: Place tunnelled central venous catheter   4/24/19: LEFT Radial-Cephalic AV Fistula Placement  6/12/19: LEFT fistula duplex: Patent. mPSV 487cm/s. Size 2.6-7.6mm. Flows 531-2733ml/m  7/22/19: LEFT fistula duplex: Patent. mPSV  379cms. Size 1.7-6.5mm. Flows 212-1248ml/m.  9/6/19 Left  Fistula Duplex: Patent.  mPSV 446cm/s. Size 4.6-7.2mm.  Flows 933-2900ml/min  10/2020 JERAMIE: 1.08 triphasic.  Left 1.39 triphasic.   2/2021  left Fistula Duplex: Patent. mPSV 648cm/s. Size 4.3-6.8mm.  Flows 547-3234ml/min      Past Medical History:   Diagnosis Date   • A-fib (CMS/[facility])    • Asthma    • CAD (coronary artery disease) 11/19/2018   • CHF (congestive heart failure) (CMS/[facility])    • Diabetes mellitus (CMS/[facility])    • Diabetic neuropathy (CMS/[facility]) 2014   • Diabetic neuropathy (CMS/[facility])    • Dialysis patient (CMS/[facility])    • Hypertension    • Kidney disease 2018    pt states that he is close to needing to be on dialysis   • Presence of combination internal cardiac defibrillator (ICD) and pacemaker    • Stage 5 chronic kidney disease on chronic dialysis (CMS/[facility]) 11/26/2019    Added automatically from request for surgery [id number]     Past Surgical History:   Procedure Laterality Date   • ARTERIOVENOUS FISTULA/SHUNT SURGERY Left 4/24/2019    Procedure: radial artery to cephalic vein ARTERIOVENOUS FISTULA;  Surgeon: Kip Hoang MD;  Location: [facility] OR;  Service: Vascular   • CARDIAC CATHETERIZATION N/A 11/16/2018    Procedure: Left Heart Cath;  Surgeon: Robin Yi MD;  Location: [facility] CATH INVASIVE LOCATION;  Service: Cardiology   • CARDIAC ELECTROPHYSIOLOGY PROCEDURE N/A 11/20/2018    Procedure: ICD SC new;  Surgeon: Garcia Haque MD;  Location: [facility] CATH INVASIVE LOCATION;  Service: Cardiology   • CATARACT EXTRACTION Bilateral    • EYE SURGERY Right    • INSERTION PERITONEAL DIALYSIS CATHETER N/A 12/3/2019    Procedure: INSERTION PERITONEAL DIALYSIS CATHETER LAPAROSCOPIC;  Surgeon: Valerio Webster MD;  Location: [facility] OR;  Service: General   • INTERVENTIONAL RADIOLOGY PROCEDURE N/A 11/19/2018    Procedure: tunneled central venous catheter placement;  Surgeon: Kip Hoang MD;  Location: [facility] ANGIO INVASIVE LOCATION;  Service: Interventional Radiology   • PACEMAKER IMPLANTATION        Family History   Problem Relation Age of Onset   • Hypertension Mother    • Diabetes Mother    • Hypertension Father    • Diabetes Father    • Asthma Maternal Grandfather      Social History     Tobacco Use   • Smoking status: Never Smoker   • Smokeless tobacco: Never Used   Substance Use Topics   • Alcohol use: No   • Drug use: No       ALLERGIES:   Motrin [ibuprofen] and Tape    MEDICATIONS:    Current Outpatient Medications:   •  albuterol (PROVENTIL HFA;VENTOLIN HFA) 108 (90 BASE) MCG/ACT inhaler, Inhale 2 puffs Every 4 (Four) Hours As Needed for Wheezing., Disp: 1 inhaler, Rfl: 1  •  amiodarone (PACERONE) 200 MG tablet, Take 100 mg by mouth Daily., Disp: , Rfl: 12  •  calcitriol (ROCALTROL) 0.25 MCG capsule, Take 0.25 mcg by mouth 3 (Three) Times a Day., Disp: , Rfl:   •  Cholecalciferol (VITAMIN D3) 44362 units capsule, Take 1 capsule by mouth Every 7 (Seven) Days., Disp: 4 capsule, Rfl: 1  •  clopidogrel (PLAVIX) 75 MG tablet, Take 1 tablet by mouth Daily., Disp: 30 tablet, Rfl: 1  •  Ferric Citrate (AURYXIA PO), Take  by mouth 3 (Three) Times a Day., Disp: , Rfl:   •  furosemide (LASIX) 40 MG tablet, Take 40 mg by mouth 2 (Two) Times a Day., Disp: , Rfl:   •  insulin glargine (LANTUS) 100 UNIT/ML injection, Inject 30 Units under the skin into the appropriate area as directed Every Night., Disp: , Rfl:   •  lisinopril (PRINIVIL,ZESTRIL) 10 MG tablet, Take 1 tablet by mouth Every Night., Disp: 30 tablet, Rfl: 1  •  metoprolol tartrate (LOPRESSOR) 50 MG tablet, Take 50 mg by mouth 2 (Two) Times a Day. One takes one tab on day of dialysis, Disp: , Rfl: 12    Current Facility-Administered Medications:   •  lidocaine-prilocaine (EMLA) 2.5-2.5 % cream, , Topical, PRN, Alyssa Rutherford, APRN    Review of Systems   Cardiovascular:        Fistula bleeding (N). Fistula Pain (N). Extremity Pain (N). Extremity Discoloration (N)     Hematologic/Lymphatic: Negative for bleeding problem.   Skin: Negative for color  change and nail changes.   Neurological: Negative for numbness and paresthesias.   All other systems reviewed and are negative.       Objective   Heart Rate:  [80] 80  BP: (140)/(86) 140/86   Vitals:    02/02/21 1524   BP: 140/86   Pulse: 80   SpO2: 96%      Body mass index is 34.17 kg/m².  Physical Exam   Constitutional: He is oriented to person, place, and time.   HENT:   Head: Atraumatic.   Cardiovascular: Normal rate.   Pulses:       Radial pulses are 2+ on the left side.   Fistula Present LEFT Extremity: (+)thrill/(+)bruit/(+)pulse. Aneurysmal (N). Water Hammer Pulse (N). Thinning (N).  Hilario's Test <3sec (Y).  Skin warm/dry/pink/intact (Y). Radial Pulse (Y).     Pulmonary/Chest: Effort normal and breath sounds normal.   Abdominal: Soft.   Musculoskeletal: Normal range of motion.      Comments: Gait normal   Neurological: He is alert and oriented to person, place, and time. No cranial nerve deficit.   Skin: Skin is warm and dry. Capillary refill takes less than 2 seconds.   No venous staining   Psychiatric: Thought content normal.   Vitals reviewed.        Assessment & Plan     Independent Review of Radiographic Studies:    2/2021  left Fistula Duplex: Patent. mPSV 648cm/s. Size 4.3-6.8mm.  Flows 547-3234ml/min    1. A-V fistula (CMS/[facility])  Today's fistula duplex demonstrates adequate diameters and flow volume to support hemodialysis    Currently on peritoneal dialysis    Plan scheduled follow up with repeat duplex in 6 months for routine surveillance.   Contact [facility] for any issue with dialysis access.     - Duplex Hemodialysis Access CAR    2. ESRD (end stage renal disease) on dialysis (CMS/HCC)  Continue peritoneal dialysis as scheduled.                 This document has been electronically signed by NICK Fisher on February 3, 2021 15:21 CST

## 2021-02-10 LAB
BH CV VAS DIALYSIS ARTERIAL ANASTOMOSIS DIAMETER: 0.3 CM
BH CV VAS DIALYSIS ARTERIAL ANASTOMOSIS PSV: 405 CM/SEC
BH CV VAS DIALYSIS CONDUIT DIST DIAMETER: 0.43 CM
BH CV VAS DIALYSIS CONDUIT DIST FLOW VOL: 838 ML/MIN
BH CV VAS DIALYSIS CONDUIT DIST PSV: 406 CM/SEC
BH CV VAS DIALYSIS CONDUIT MID DIAMETER: 0.68 CM
BH CV VAS DIALYSIS CONDUIT MID FLOW VOL: 665 ML/MIN
BH CV VAS DIALYSIS CONDUIT MID PSV: 76 CM/SEC
BH CV VAS DIALYSIS CONDUIT MID/DIST DIAMETER: 0.59 CM
BH CV VAS DIALYSIS CONDUIT MID/DIST FLOW VOL: 547 ML/MIN
BH CV VAS DIALYSIS CONDUIT MID/DIST PSV: 152 CM/SEC
BH CV VAS DIALYSIS CONDUIT PROX DIAMETER: 0.61 CM
BH CV VAS DIALYSIS CONDUIT PROX FLOW VOL: 3234 ML/MIN
BH CV VAS DIALYSIS CONDUIT PROX PSV: 490 CM/SEC
BH CV VAS DIALYSIS CONDUIT PROX/MID DIAMETER: 0.49 CM
BH CV VAS DIALYSIS CONDUIT PROX/MID FLOW VOL: 1079 ML/MIN
BH CV VAS DIALYSIS CONDUIT PROX/MID PSV: 648 CM/SEC

## 2023-03-03 ENCOUNTER — OFFICE VISIT (OUTPATIENT)
Dept: SURGERY | Facility: CLINIC | Age: 58
End: 2023-03-03
Payer: MEDICARE

## 2023-03-03 VITALS
DIASTOLIC BLOOD PRESSURE: 80 MMHG | HEART RATE: 50 BPM | SYSTOLIC BLOOD PRESSURE: 118 MMHG | WEIGHT: 201 LBS | BODY MASS INDEX: 28.14 KG/M2 | HEIGHT: 71 IN | OXYGEN SATURATION: 90 %

## 2023-03-03 DIAGNOSIS — N18.6 ESRF (END STAGE RENAL FAILURE): Primary | Chronic | ICD-10-CM

## 2023-03-03 RX ORDER — BUSPIRONE HYDROCHLORIDE 5 MG/1
5 TABLET ORAL 3 TIMES DAILY PRN
COMMUNITY
Start: 2022-12-15

## 2023-03-03 RX ORDER — CARVEDILOL 12.5 MG/1
1 TABLET ORAL EVERY 12 HOURS SCHEDULED
COMMUNITY
Start: 2022-12-05 | End: 2023-03-31

## 2023-03-03 RX ORDER — SEVELAMER CARBONATE 800 MG/1
TABLET, FILM COATED ORAL
COMMUNITY
Start: 2023-02-28

## 2023-03-03 RX ORDER — FAMOTIDINE 20 MG/1
TABLET, FILM COATED ORAL
COMMUNITY
Start: 2023-02-28

## 2023-03-03 RX ORDER — LACTULOSE 10 G/15ML
45 SOLUTION ORAL
COMMUNITY
Start: 2023-02-07

## 2023-03-03 RX ORDER — FLUCONAZOLE 200 MG/1
TABLET ORAL
COMMUNITY
End: 2023-03-31

## 2023-03-03 RX ORDER — PREGABALIN 100 MG/1
CAPSULE ORAL
COMMUNITY

## 2023-03-03 RX ORDER — MIDODRINE HYDROCHLORIDE 5 MG/1
TABLET ORAL
COMMUNITY
Start: 2023-02-28

## 2023-03-03 RX ORDER — ERGOCALCIFEROL 1.25 MG/1
CAPSULE ORAL
COMMUNITY

## 2023-03-03 RX ORDER — CYCLOBENZAPRINE HCL 10 MG
TABLET ORAL EVERY 8 HOURS SCHEDULED
COMMUNITY

## 2023-03-03 RX ORDER — SACUBITRIL AND VALSARTAN 24; 26 MG/1; MG/1
TABLET, FILM COATED ORAL
COMMUNITY

## 2023-03-03 RX ORDER — MELOXICAM 15 MG/1
TABLET ORAL EVERY 24 HOURS
COMMUNITY
End: 2023-03-31

## 2023-03-03 RX ORDER — INSULIN ASPART 100 [IU]/ML
INJECTION, SOLUTION INTRAVENOUS; SUBCUTANEOUS
COMMUNITY

## 2023-03-03 RX ORDER — CINACALCET 90 MG/1
TABLET, FILM COATED ORAL
COMMUNITY

## 2023-03-03 NOTE — PROGRESS NOTES
Chief Complaint   Patient presents with   • Follow-up     PD Cath replacement (seen in 2019)        HPI  PD catheter placed 4 years ago. Question as to whether it is functioning well. Was at rehab in [place] where they had problems. To see Carlee today. On Plavix. Currently on hemodialysis  Past Medical History:   Diagnosis Date   • A-fib (HCC)    • Asthma    • CAD (coronary artery disease) 11/19/2018   • CHF (congestive heart failure) ([facility])    • Diabetes mellitus ([facility])    • Diabetic neuropathy ([facility]) 2014   • Diabetic neuropathy ([facility])    • Dialysis patient ([facility])    • Hypertension    • Kidney disease 2018    pt states that he is close to needing to be on dialysis   • Presence of combination internal cardiac defibrillator (ICD) and pacemaker    • Stage 5 chronic kidney disease on chronic dialysis ([facility]) 11/26/2019    Added automatically from request for surgery [id number]       Past Surgical History:   Procedure Laterality Date   • ARTERIOVENOUS FISTULA/SHUNT SURGERY Left 04/24/2019    Procedure: radial artery to cephalic vein ARTERIOVENOUS FISTULA;  Surgeon: Kip Hoang MD;  Location: [facility] OR;  Service: Vascular   • CARDIAC CATHETERIZATION N/A 11/16/2018    Procedure: Left Heart Cath;  Surgeon: Robin Yi MD;  Location: [facility] CATH INVASIVE LOCATION;  Service: Cardiology   • CARDIAC ELECTROPHYSIOLOGY PROCEDURE N/A 11/20/2018    Procedure: ICD SC new;  Surgeon: Garcia Haque MD;  Location: [facility] CATH INVASIVE LOCATION;  Service: Cardiology   • CATARACT EXTRACTION Bilateral    • EYE SURGERY Right    • INSERTION PERITONEAL DIALYSIS CATHETER N/A 12/03/2019    Procedure: INSERTION PERITONEAL DIALYSIS CATHETER LAPAROSCOPIC;  Surgeon: Valerio Webster MD;  Location: [facility] OR;  Service: General   • INTERVENTIONAL RADIOLOGY PROCEDURE N/A 11/19/2018    Procedure: tunneled central venous catheter placement;  Surgeon: Kip Hoang MD;  Location: [facility] ANGIO INVASIVE LOCATION;  Service:  Interventional Radiology   • LAPAROSCOPIC CHOLECYSTECTOMY  02/07/2023   • PACEMAKER IMPLANTATION           Current Outpatient Medications:   •  albuterol (PROVENTIL HFA;VENTOLIN HFA) 108 (90 BASE) MCG/ACT inhaler, Inhale 2 puffs Every 4 (Four) Hours As Needed for Wheezing., Disp: 1 inhaler, Rfl: 1  •  amiodarone (PACERONE) 200 MG tablet, Take 100 mg by mouth Daily., Disp: , Rfl: 12  •  busPIRone (BUSPAR) 5 MG tablet, Take 1 tablet by mouth 3 (Three) Times a Day As Needed. for anxiety, Disp: , Rfl:   •  calcitriol (ROCALTROL) 0.25 MCG capsule, Take 1 capsule by mouth 3 (Three) Times a Day., Disp: , Rfl:   •  carvedilol (COREG) 12.5 MG tablet, Take 1 tablet by mouth Every 12 (Twelve) Hours., Disp: , Rfl:   •  Cholecalciferol (VITAMIN D3) 27216 units capsule, Take 1 capsule by mouth Every 7 (Seven) Days., Disp: 4 capsule, Rfl: 1  •  cinacalcet (SENSIPAR) 90 MG tablet, Take  by mouth., Disp: , Rfl:   •  clopidogrel (PLAVIX) 75 MG tablet, Take 1 tablet by mouth Daily., Disp: 30 tablet, Rfl: 1  •  furosemide (LASIX) 40 MG tablet, Take 1 tablet by mouth 2 (Two) Times a Day., Disp: , Rfl:   •  insulin glargine (LANTUS) 100 UNIT/ML injection, Inject 30 Units under the skin into the appropriate area as directed Every Night., Disp: , Rfl:   •  metoprolol tartrate (LOPRESSOR) 50 MG tablet, Take 1 tablet by mouth 2 (Two) Times a Day. One takes one tab on day of dialysis, Disp: , Rfl: 12  •  pregabalin (LYRICA) 100 MG capsule, pregabalin 100 mg capsule  TAKE 1 CAPSULE BY MOUTH TWICE DAILY AS NEEDED FOR PAIN, Disp: , Rfl:   •  sacubitril-valsartan (Entresto) 24-26 MG tablet, Entresto 24 mg-26 mg tablet  TAKE 1 TABLET BY MOUTH TWICE DAILY, Disp: , Rfl:   •  sevelamer (RENVELA) 800 MG tablet, , Disp: , Rfl:   •  cyclobenzaprine (FLEXERIL) 10 MG tablet, Every 8 (Eight) Hours., Disp: , Rfl:   •  ergocalciferol (ERGOCALCIFEROL) 1.25 MG (38482 UT) capsule, ergocalciferol (vitamin D2) 1,250 mcg (50,000 unit) capsule  TAKE 1 CAPSULE BY  MOUTH ONCE A WEEK, Disp: , Rfl:   •  famotidine (PEPCID) 20 MG tablet, , Disp: , Rfl:   •  Ferric Citrate (AURYXIA PO), Take  by mouth 3 (Three) Times a Day., Disp: , Rfl:   •  Ferric Citrate 1  MG(Fe) tablet, Take 1 tablet by mouth 3 (Three) Times a Day., Disp: , Rfl:   •  fluconazole (DIFLUCAN) 200 MG tablet, fluconazole 200 mg tablet  TAKE 1 TABLET BY MOUTH EVERY 48 HOURS, Disp: , Rfl:   •  Insulin Aspart (novoLOG) 100 UNIT/ML injection, Inject  under the skin into the appropriate area as directed., Disp: , Rfl:   •  lactulose (CHRONULAC) 10 GM/15ML solution, Take 45 mL by mouth., Disp: , Rfl:   •  lisinopril (PRINIVIL,ZESTRIL) 10 MG tablet, Take 1 tablet by mouth Every Night., Disp: 30 tablet, Rfl: 1  •  meloxicam (MOBIC) 15 MG tablet, Daily., Disp: , Rfl:   •  midodrine (PROAMATINE) 5 MG tablet, , Disp: , Rfl:   •  sertraline (ZOLOFT) 50 MG tablet, , Disp: , Rfl:     Current Facility-Administered Medications:   •  lidocaine-prilocaine (EMLA) 2.5-2.5 % cream, , Topical, PRN, Alyssa Rutherford, NICK    Allergies   Allergen Reactions   • Motrin [Ibuprofen] Hives   • Tape Other (See Comments)     Takes skin off       Family History   Problem Relation Age of Onset   • Hypertension Mother    • Diabetes Mother    • Hypertension Father    • Diabetes Father    • Asthma Maternal Grandfather        Social History     Socioeconomic History   • Marital status:    Tobacco Use   • Smoking status: Never   • Smokeless tobacco: Never   Substance and Sexual Activity   • Alcohol use: No   • Drug use: No   • Sexual activity: Defer       Review of Systems   Constitutional: Negative.    All other systems reviewed and are negative.      Physical Exam  Vitals reviewed.   Constitutional:       Appearance: Normal appearance.   Cardiovascular:      Rate and Rhythm: Normal rate and regular rhythm.   Pulmonary:      Effort: Pulmonary effort is normal. No respiratory distress.   Abdominal:      General: Abdomen is flat. There  is no distension.      Palpations: Abdomen is soft. There is no mass.      Tenderness: There is no abdominal tenderness. There is no guarding or rebound.      Hernia: No hernia is present.       Musculoskeletal:      Cervical back: Normal range of motion and neck supple.   Neurological:      Mental Status: He is alert.           ASSESSMENT    Diagnoses and all orders for this visit:    1. ESRF (end stage renal failure) ([facility]) (Primary)  -     Case Request; Standing  -     ceFAZolin (ANCEF) 2 g in sodium chloride 0.9 % 100 mL IVPB    Other orders  -     Follow Anesthesia Guidelines / Protocol; Future  -     Provide Chlorhexidine Skin Prep Wipes and Instructions; Future  -     Follow Anesthesia Guidelines / Protocol; Standing  -     Obtain Informed Consent; Standing  -     Place sequential compression device- to be placed on patient in Pre-op; Standing  -     Verify / Perform Chlorhexidine Skin Prep; Standing  -     Verify NPO Status; Standing        PLAN    1. REVISION/POSSIBLE REINSERTION PERITONEAL DIALYSIS CATHETER LAPAROSCOPIC    The following were discussed with the patient/family:      What are the indications that have led your doctor to the opinion that an operation is necessary?    KIdney function has decreased to a point that renal replacement therapy is deemed necessary despite medical managemen.    What, if any, alternative treatments are available for your condition?    Alternatives include ongoing medication or hemodialysis.    What will be the likely result if you don't have the operation?    Worsening renal failure could lead to coma, heart or lung failure, and other consequences of untreated renal failure.    What are the basic procedures involved in the operation?    The procedures explained to the patient. The abdomen is filled with carbon dioxide gas. Small incisions and tubes are placed into the abdomen. A laparoscope is placed into the abdomen the catheter will be placed into the area behind the  bladder under direct vision and then positioned to exit on the abdominal wall or chest..    What are the risks?    Complications are explained and include those that are early (occurring < 30 days post placement) or late (>30 days post placement).      Early complications include but are not limited to:     Bowel perforation - less than 1%, and it usually occurs during entry into the abdominal cavity. Surgical exploration is necessary with repair of the perforation and removal of the catheter.    Bleeding - rarely a significant problem after peritoneal dialysis catheter placement. When bleeding occurs, it is usually at the exit site.  Wound infection- Wound infection is uncommon and often can be treated with antibiotics when it is superficial. If the wound is deeper, then it may need to be drained and the catheter removed.  Outflow failure- may be due to several causes including clots or fibrin in the catheter, a kink in the subcutaneous tunnel, encasement of the catheter by the omentum, or adhesions. Laparoscopy may be required for identification and treatment of obstruction due to omentum or adhesions.   Leakage of the dialysate- may be identified by the presence of drainage at the exit site or the appearance of a bulge underneath the entrance site.  Peritonitis- may occur early and manifests as abdominal pain associated with cloudy peritoneal fluid. The fluid should be cultured, and appropriate antibiotics should be administered. Catheter removal may be necessary.     Late complications may include but are not limited to:     Infection of the exit site or tunnel   Cuff protrusion  Outflow failure or dialysate leaks  Hernias  Peritonitis  Inadequate dialysis    Any complicaion can lead to additional operation or modified incisions.  How is the operation expected to improve your health or quality of life?    Peritoneal dialysis allows home dialysis daily without the need for frequent visits to the dialysis center.  Renal replacement therapy can, therefore, occur in a more convenient setting.    Is hospitalization necessary and, if so, how long can you expect to be hospitalized?    The procedure is usually performed on an outpatient basis.    What can you expect during your recovery period?    Gas from the laparoscopy can cause bloating and shoulder pain. Pain is managed with a combination of opoid and non-opoid medicine given by mouth.    When can you expect to resume normal activities?    Normal activity can be resumed within 1 week.    Are there likely to be residual effects from the operation?    No common residual effects occur.     The patient has been explained the risks and benefits, has a clear understanding of the procedure. All questions answered. Desires to proceed.                 This document has been electronically signed by Valerio Webster MD on March 3, 2023 10:00 CST

## 2023-03-20 DIAGNOSIS — I73.9 PVD (PERIPHERAL VASCULAR DISEASE) WITH CLAUDICATION: Primary | ICD-10-CM

## 2023-03-31 ENCOUNTER — OFFICE VISIT (OUTPATIENT)
Dept: CARDIAC SURGERY | Facility: CLINIC | Age: 58
End: 2023-03-31
Payer: MEDICARE

## 2023-03-31 VITALS
SYSTOLIC BLOOD PRESSURE: 82 MMHG | DIASTOLIC BLOOD PRESSURE: 60 MMHG | HEIGHT: 71 IN | OXYGEN SATURATION: 97 % | BODY MASS INDEX: 28.03 KG/M2 | HEART RATE: 58 BPM

## 2023-03-31 DIAGNOSIS — N18.6 ESRF (END STAGE RENAL FAILURE): Chronic | ICD-10-CM

## 2023-03-31 DIAGNOSIS — I77.0 A-V FISTULA: Primary | ICD-10-CM

## 2023-03-31 DIAGNOSIS — L97.529 BILATERAL DIABETIC FOOT ULCER ASSOCIATED WITH SECONDARY DIABETES MELLITUS: ICD-10-CM

## 2023-03-31 DIAGNOSIS — L97.519 BILATERAL DIABETIC FOOT ULCER ASSOCIATED WITH SECONDARY DIABETES MELLITUS: ICD-10-CM

## 2023-03-31 DIAGNOSIS — I73.9 PVD (PERIPHERAL VASCULAR DISEASE) WITH CLAUDICATION: ICD-10-CM

## 2023-03-31 DIAGNOSIS — I50.23 ACUTE ON CHRONIC SYSTOLIC HEART FAILURE: Chronic | ICD-10-CM

## 2023-03-31 DIAGNOSIS — E08.621 BILATERAL DIABETIC FOOT ULCER ASSOCIATED WITH SECONDARY DIABETES MELLITUS: ICD-10-CM

## 2023-03-31 PROCEDURE — 99214 OFFICE O/P EST MOD 30 MIN: CPT | Performed by: NURSE PRACTITIONER

## 2023-03-31 PROCEDURE — 3074F SYST BP LT 130 MM HG: CPT | Performed by: NURSE PRACTITIONER

## 2023-03-31 PROCEDURE — 3078F DIAST BP <80 MM HG: CPT | Performed by: NURSE PRACTITIONER

## 2023-03-31 RX ORDER — CARVEDILOL 3.12 MG/1
3.12 TABLET ORAL 2 TIMES DAILY WITH MEALS
COMMUNITY

## 2023-03-31 NOTE — LETTER
April 3, 2023     NICK Holland  [address]  [address]  [facility] [address]    Patient: Jamil Olivo   YOB: 1965   Date of Visit: 3/31/2023       Dear NICK Rosenberg:    Thank you for referring Jamil Olivo to me for evaluation. Below are the relevant portions of my assessment and plan of care.    If you have questions, please do not hesitate to call me. I look forward to following Jamil along with you.         Sincerely,        NICK Fisher        CC: MD Robin Bach MD Davis, Charlie C, APRN  04/03/23 0929  Signed  CVTS Office Progress Note       Subjective    Patient ID: Jamil Olivo is a 57 y.o. male is here today for follow-up.    Chief Complaint:    Chief Complaint   Patient presents with   • Hemodialysis Access       The following portions of the patient's history were reviewed and updated as appropriate: allergies, current medications, past family history, past medical history, past social history, past surgical history and problem list.  Recent images independently reviewed.  Available laboratory values reviewed.  HPI    PCP:  John Hyde MD  Cardiology:  Kd  [facility]    57 y.o. male with HTN(stable, increased risk stroke, rupture), Hyperlipidemia(stable, increased risk cardiovascular events), Diabetes Mellitus(stable, increased risk cardiovascular events) and Chronic Kidney Disease(stable, increased risk renal failure), diabetic foot ulcer new, severe systolic heart failure, peripheral vascular disease.  never smoked.  ESRD on hemodialysis since 11/2018. Requires long term AV access for hemodialysis.  Remote hospitalization secondary to heart failure, infection AV fistula occluded during hospitalization approximately 3 months ago no call to vascular surgery.  Referred to vascular surgery for diabetic foot wounds occurring during rehabilitation that are slow  healing.  Reports today with JERAMIE.   Nausea vomiting today, significant hypotension, patient stated that this was normal for him and he refused emergency department evaluation.  No other associated signs, symptoms or modifying factors.    4/2019 Upper extremity vein mapping:  RIGHT cephalic 1.1-4.3mm, basilic 1.4-2.6mm.  LEFT cephalic 2.6-5.2mm.  Distal radial 2.0mm.  High bifurcation brachial artery prox mid humerus.    3/2017 Echocardiogram:  EF 15% LA 54mm, LV 70mm, RVSP 52mmHg.  Mild TR.  Mild-moderate MR.  8/2017 Stress Test:  EF 20%, moderate to severe inferolateral basal ischemia.  Moderate inferior scar.  11/2018 Cardiac Catheterization:  LAD mild irregularities, %, %.  EF 20%.  11/2018 ICD placement (LEFT SCV)  11/19/18: Place tunnelled central venous catheter   4/24/19: LEFT Radial-Cephalic AV Fistula Placement  6/12/19: LEFT fistula duplex: Patent. mPSV 487cm/s. Size 2.6-7.6mm. Flows 531-2733ml/m  7/22/19: LEFT fistula duplex: Patent. mPSV  379cms. Size 1.7-6.5mm. Flows 212-1248ml/m.  9/6/19 Left Fistula Duplex: Patent.  mPSV 446cm/s. Size 4.6-7.2mm.  Flows 933-2900ml/min  10/2020 JERAMIE: 1.08 triphasic.  Left 1.39 triphasic.   2/2021  left Fistula Duplex: Patent. mPSV 648cm/s. Size 4.3-6.8mm.  Flows 547-3234ml/min    3/2023 JERAMIE: Right 1.08 triphasic posterior tibial unable to obtain accurate waveforms peroneal DP due to leg spasms.  Left NC triphasic throughout      Past Medical History:   Diagnosis Date   • A-fib    • Asthma    • CAD (coronary artery disease) 11/19/2018   • CHF (congestive heart failure)    • Diabetes mellitus    • Diabetic neuropathy 2014   • Diabetic neuropathy    • Dialysis patient    • Hypertension    • Kidney disease 2018    pt states that he is close to needing to be on dialysis   • Presence of combination internal cardiac defibrillator (ICD) and pacemaker    • Stage 5 chronic kidney disease on chronic dialysis 11/26/2019    Added automatically from request for surgery  [id number]     Past Surgical History:   Procedure Laterality Date   • ARTERIOVENOUS FISTULA/SHUNT SURGERY Left 04/24/2019    Procedure: radial artery to cephalic vein ARTERIOVENOUS FISTULA;  Surgeon: Kip Hoang MD;  Location: [facility] OR;  Service: Vascular   • CARDIAC CATHETERIZATION N/A 11/16/2018    Procedure: Left Heart Cath;  Surgeon: Robin Yi MD;  Location: [facility] CATH INVASIVE LOCATION;  Service: Cardiology   • CARDIAC ELECTROPHYSIOLOGY PROCEDURE N/A 11/20/2018    Procedure: ICD SC new;  Surgeon: Garcia Haque MD;  Location: [facility] CATH INVASIVE LOCATION;  Service: Cardiology   • CATARACT EXTRACTION Bilateral    • EYE SURGERY Right    • INSERTION PERITONEAL DIALYSIS CATHETER N/A 12/03/2019    Procedure: INSERTION PERITONEAL DIALYSIS CATHETER LAPAROSCOPIC;  Surgeon: Valerio Websetr MD;  Location: [facility] OR;  Service: General   • INTERVENTIONAL RADIOLOGY PROCEDURE N/A 11/19/2018    Procedure: tunneled central venous catheter placement;  Surgeon: Kip Hoang MD;  Location: [facility] ANGIO INVASIVE LOCATION;  Service: Interventional Radiology   • LAPAROSCOPIC CHOLECYSTECTOMY  02/07/2023   • PACEMAKER IMPLANTATION       Family History   Problem Relation Age of Onset   • Hypertension Mother    • Diabetes Mother    • Hypertension Father    • Diabetes Father    • Asthma Maternal Grandfather      Social History     Tobacco Use   • Smoking status: Never     Passive exposure: Never   • Smokeless tobacco: Never   Substance Use Topics   • Alcohol use: No   • Drug use: No       ALLERGIES:   Motrin [ibuprofen]    MEDICATIONS:    Current Outpatient Medications:   •  albuterol (PROVENTIL HFA;VENTOLIN HFA) 108 (90 BASE) MCG/ACT inhaler, Inhale 2 puffs Every 4 (Four) Hours As Needed for Wheezing., Disp: 1 inhaler, Rfl: 1  •  amiodarone (PACERONE) 200 MG tablet, Take 100 mg by mouth Daily., Disp: , Rfl: 12  •  busPIRone (BUSPAR) 5 MG tablet, Take 1 tablet by mouth 3 (Three) Times a Day As Needed. for  anxiety, Disp: , Rfl:   •  calcitriol (ROCALTROL) 0.25 MCG capsule, Take 1 capsule by mouth 3 (Three) Times a Day., Disp: , Rfl:   •  carvedilol (COREG) 3.125 MG tablet, Take 1 tablet by mouth 2 (Two) Times a Day With Meals., Disp: , Rfl:   •  Cholecalciferol (VITAMIN D3) 79553 units capsule, Take 1 capsule by mouth Every 7 (Seven) Days., Disp: 4 capsule, Rfl: 1  •  cinacalcet (SENSIPAR) 90 MG tablet, Take  by mouth., Disp: , Rfl:   •  clopidogrel (PLAVIX) 75 MG tablet, Take 1 tablet by mouth Daily., Disp: 30 tablet, Rfl: 1  •  cyclobenzaprine (FLEXERIL) 10 MG tablet, Every 8 (Eight) Hours., Disp: , Rfl:   •  ergocalciferol (ERGOCALCIFEROL) 1.25 MG (91957 UT) capsule, ergocalciferol (vitamin D2) 1,250 mcg (50,000 unit) capsule  TAKE 1 CAPSULE BY MOUTH ONCE A WEEK, Disp: , Rfl:   •  famotidine (PEPCID) 20 MG tablet, , Disp: , Rfl:   •  Ferric Citrate (AURYXIA PO), Take  by mouth 3 (Three) Times a Day., Disp: , Rfl:   •  Ferric Citrate 1  MG(Fe) tablet, Take 1 tablet by mouth 3 (Three) Times a Day., Disp: , Rfl:   •  Insulin Aspart (novoLOG) 100 UNIT/ML injection, Inject  under the skin into the appropriate area as directed., Disp: , Rfl:   •  insulin glargine (LANTUS) 100 UNIT/ML injection, Inject 30 Units under the skin into the appropriate area as directed Every Night., Disp: , Rfl:   •  lactulose (CHRONULAC) 10 GM/15ML solution, Take 45 mL by mouth., Disp: , Rfl:   •  midodrine (PROAMATINE) 5 MG tablet, , Disp: , Rfl:   •  pregabalin (LYRICA) 100 MG capsule, pregabalin 100 mg capsule  TAKE 1 CAPSULE BY MOUTH TWICE DAILY AS NEEDED FOR PAIN, Disp: , Rfl:   •  sacubitril-valsartan (Entresto) 24-26 MG tablet, Entresto 24 mg-26 mg tablet  TAKE 1 TABLET BY MOUTH TWICE DAILY, Disp: , Rfl:   •  sertraline (ZOLOFT) 50 MG tablet, , Disp: , Rfl:   •  sevelamer (RENVELA) 800 MG tablet, , Disp: , Rfl:     Current Facility-Administered Medications:   •  lidocaine-prilocaine (EMLA) 2.5-2.5 % cream, , Topical, PRN, Sangeeta,  NICK Davila    Review of Systems   Constitutional: Positive for malaise/fatigue and weight loss.   Cardiovascular: Positive for dyspnea on exertion. Negative for claudication.        Fistula bleeding (N). Fistula Pain (N). Extremity Pain (N). Extremity Discoloration (N)     Respiratory: Positive for shortness of breath.    Hematologic/Lymphatic: Negative for bleeding problem.   Skin: Positive for color change, poor wound healing and unusual hair distribution.   Musculoskeletal: Positive for arthritis and back pain.   Gastrointestinal: Positive for nausea and vomiting.   Neurological: Negative for numbness and paresthesias.   Psychiatric/Behavioral: Negative for altered mental status.   All other systems reviewed and are negative.       Objective        Vitals:    03/31/23 1448   BP: (!) 82/60   Pulse: 58   SpO2: 97%      Body mass index is 28.03 kg/m².  Physical Exam   Constitutional: He is oriented to person, place, and time. He appears ill.   HENT:   Head: Atraumatic.   Cardiovascular: Normal rate.   Pulses:       Radial pulses are 2+ on the left side.   Fistula Present LEFT Extremity: No thrill or bruit     Pulmonary/Chest: Effort normal.   Abdominal: Soft.   Musculoskeletal: Normal range of motion.      Right lower leg: Edema present.      Left lower leg: Edema present.      Comments: Gait normal   Neurological: He is alert and oriented to person, place, and time. No cranial nerve deficit.   Skin: Skin is warm and dry. Capillary refill takes 2 to 3 seconds.   No venous staining  Bilateral toes with gangrene, erythema   Psychiatric: Thought content normal.   Vitals reviewed.        Assessment & Plan     Independent Review of Radiographic Studies:      1. A-V fistula  Patient reports his fistula has been occluded for 3 months, did not contact office when this occurred.    He is not currently on hemodialysis, contact office should HD be required in the future and consideration for new access.    2. ESRF (end  stage renal failure)  Dialysis via peritoneal.    3. PVD (peripheral vascular disease) with claudication  Today's evaluation is limited due to patient's leg spasms, nausea and vomiting.    However we were able to to obtain good triphasic distal signals bilaterally.  I feel that his distal perfusion is adequate to promote tissue healing.  Wounds appear to be the result of injury, and likely complicated by hypoperfusion secondary to his persistent hypotension and chronic heart failure with reduced EF.    These findings were communicated to his wound care provider Shoshana ROMERO.    He remains on Plavix    4. Bilateral diabetic foot ulcer associated with secondary diabetes mellitus  Following up in  on Monday  Lab Results   Component Value Date    HGBA1C 10.02 (H) 03/24/2017         5. Acute on chronic systolic heart failure  He is currently on Entresto, he is also ESRD on peritoneal dialysis.  He is quite hypotensive today in office systolic in the 70s on JERAMIE, he refused emergency department evaluation.  On midodrine.    I strongly recommended to patient that he contact his nephrologist as well as his cardiologist to discuss his use of Entresto further given his persistent hypotension.                  This document has been electronically signed by NICK Fisher on April 3, 2023 09:04 CDT

## 2023-04-03 NOTE — PROGRESS NOTES
CVTS Office Progress Note       Subjective   Patient ID: Jamil Olivo is a 57 y.o. male is here today for follow-up.    Chief Complaint:    Chief Complaint   Patient presents with   • Hemodialysis Access       The following portions of the patient's history were reviewed and updated as appropriate: allergies, current medications, past family history, past medical history, past social history, past surgical history and problem list.  Recent images independently reviewed.  Available laboratory values reviewed.  HPI    PCP:  John Hyde MD  Cardiology:  Kd  [facility]    57 y.o. male with HTN(stable, increased risk stroke, rupture), Hyperlipidemia(stable, increased risk cardiovascular events), Diabetes Mellitus(stable, increased risk cardiovascular events) and Chronic Kidney Disease(stable, increased risk renal failure), diabetic foot ulcer new, severe systolic heart failure, peripheral vascular disease.  never smoked.  ESRD on hemodialysis since 11/2018. Requires long term AV access for hemodialysis.  Remote hospitalization secondary to heart failure, infection AV fistula occluded during hospitalization approximately 3 months ago no call to vascular surgery.  Referred to vascular surgery for diabetic foot wounds occurring during rehabilitation that are slow healing.  Reports today with JERAMIE.   Nausea vomiting today, significant hypotension, patient stated that this was normal for him and he refused emergency department evaluation.  No other associated signs, symptoms or modifying factors.    4/2019 Upper extremity vein mapping:  RIGHT cephalic 1.1-4.3mm, basilic 1.4-2.6mm.  LEFT cephalic 2.6-5.2mm.  Distal radial 2.0mm.  High bifurcation brachial artery prox mid humerus.    3/2017 Echocardiogram:  EF 15% LA 54mm, LV 70mm, RVSP 52mmHg.  Mild TR.  Mild-moderate MR.  8/2017 Stress Test:  EF 20%, moderate to severe inferolateral basal ischemia.  Moderate  inferior scar.  11/2018 Cardiac Catheterization:  LAD mild irregularities, %, %.  EF 20%.  11/2018 ICD placement (LEFT SCV)  11/19/18: Place tunnelled central venous catheter   4/24/19: LEFT Radial-Cephalic AV Fistula Placement  6/12/19: LEFT fistula duplex: Patent. mPSV 487cm/s. Size 2.6-7.6mm. Flows 531-2733ml/m  7/22/19: LEFT fistula duplex: Patent. mPSV  379cms. Size 1.7-6.5mm. Flows 212-1248ml/m.  9/6/19 Left Fistula Duplex: Patent.  mPSV 446cm/s. Size 4.6-7.2mm.  Flows 933-2900ml/min  10/2020 JERAMIE: 1.08 triphasic.  Left 1.39 triphasic.   2/2021  left Fistula Duplex: Patent. mPSV 648cm/s. Size 4.3-6.8mm.  Flows 547-3234ml/min    3/2023 JERAMIE: Right 1.08 triphasic posterior tibial unable to obtain accurate waveforms peroneal DP due to leg spasms.  Left NC triphasic throughout      Past Medical History:   Diagnosis Date   • A-fib    • Asthma    • CAD (coronary artery disease) 11/19/2018   • CHF (congestive heart failure)    • Diabetes mellitus    • Diabetic neuropathy 2014   • Diabetic neuropathy    • Dialysis patient    • Hypertension    • Kidney disease 2018    pt states that he is close to needing to be on dialysis   • Presence of combination internal cardiac defibrillator (ICD) and pacemaker    • Stage 5 chronic kidney disease on chronic dialysis 11/26/2019    Added automatically from request for surgery [id number]     Past Surgical History:   Procedure Laterality Date   • ARTERIOVENOUS FISTULA/SHUNT SURGERY Left 04/24/2019    Procedure: radial artery to cephalic vein ARTERIOVENOUS FISTULA;  Surgeon: iKp Hoang MD;  Location: [facility] OR;  Service: Vascular   • CARDIAC CATHETERIZATION N/A 11/16/2018    Procedure: Left Heart Cath;  Surgeon: Robin Yi MD;  Location: [facility] CATH INVASIVE LOCATION;  Service: Cardiology   • CARDIAC ELECTROPHYSIOLOGY PROCEDURE N/A 11/20/2018    Procedure: ICD SC new;  Surgeon: Garcia Haque MD;  Location: [facility] CATH INVASIVE LOCATION;  Service:  Cardiology   • CATARACT EXTRACTION Bilateral    • EYE SURGERY Right    • INSERTION PERITONEAL DIALYSIS CATHETER N/A 12/03/2019    Procedure: INSERTION PERITONEAL DIALYSIS CATHETER LAPAROSCOPIC;  Surgeon: Valerio Webster MD;  Location: [facility] OR;  Service: General   • INTERVENTIONAL RADIOLOGY PROCEDURE N/A 11/19/2018    Procedure: tunneled central venous catheter placement;  Surgeon: Kip Hoang MD;  Location: [facility] ANGIO INVASIVE LOCATION;  Service: Interventional Radiology   • LAPAROSCOPIC CHOLECYSTECTOMY  02/07/2023   • PACEMAKER IMPLANTATION       Family History   Problem Relation Age of Onset   • Hypertension Mother    • Diabetes Mother    • Hypertension Father    • Diabetes Father    • Asthma Maternal Grandfather      Social History     Tobacco Use   • Smoking status: Never     Passive exposure: Never   • Smokeless tobacco: Never   Substance Use Topics   • Alcohol use: No   • Drug use: No       ALLERGIES:   Motrin [ibuprofen]    MEDICATIONS:    Current Outpatient Medications:   •  albuterol (PROVENTIL HFA;VENTOLIN HFA) 108 (90 BASE) MCG/ACT inhaler, Inhale 2 puffs Every 4 (Four) Hours As Needed for Wheezing., Disp: 1 inhaler, Rfl: 1  •  amiodarone (PACERONE) 200 MG tablet, Take 100 mg by mouth Daily., Disp: , Rfl: 12  •  busPIRone (BUSPAR) 5 MG tablet, Take 1 tablet by mouth 3 (Three) Times a Day As Needed. for anxiety, Disp: , Rfl:   •  calcitriol (ROCALTROL) 0.25 MCG capsule, Take 1 capsule by mouth 3 (Three) Times a Day., Disp: , Rfl:   •  carvedilol (COREG) 3.125 MG tablet, Take 1 tablet by mouth 2 (Two) Times a Day With Meals., Disp: , Rfl:   •  Cholecalciferol (VITAMIN D3) 01963 units capsule, Take 1 capsule by mouth Every 7 (Seven) Days., Disp: 4 capsule, Rfl: 1  •  cinacalcet (SENSIPAR) 90 MG tablet, Take  by mouth., Disp: , Rfl:   •  clopidogrel (PLAVIX) 75 MG tablet, Take 1 tablet by mouth Daily., Disp: 30 tablet, Rfl: 1  •  cyclobenzaprine (FLEXERIL) 10 MG tablet, Every 8 (Eight) Hours.,  Disp: , Rfl:   •  ergocalciferol (ERGOCALCIFEROL) 1.25 MG (03090 UT) capsule, ergocalciferol (vitamin D2) 1,250 mcg (50,000 unit) capsule  TAKE 1 CAPSULE BY MOUTH ONCE A WEEK, Disp: , Rfl:   •  famotidine (PEPCID) 20 MG tablet, , Disp: , Rfl:   •  Ferric Citrate (AURYXIA PO), Take  by mouth 3 (Three) Times a Day., Disp: , Rfl:   •  Ferric Citrate 1  MG(Fe) tablet, Take 1 tablet by mouth 3 (Three) Times a Day., Disp: , Rfl:   •  Insulin Aspart (novoLOG) 100 UNIT/ML injection, Inject  under the skin into the appropriate area as directed., Disp: , Rfl:   •  insulin glargine (LANTUS) 100 UNIT/ML injection, Inject 30 Units under the skin into the appropriate area as directed Every Night., Disp: , Rfl:   •  lactulose (CHRONULAC) 10 GM/15ML solution, Take 45 mL by mouth., Disp: , Rfl:   •  midodrine (PROAMATINE) 5 MG tablet, , Disp: , Rfl:   •  pregabalin (LYRICA) 100 MG capsule, pregabalin 100 mg capsule  TAKE 1 CAPSULE BY MOUTH TWICE DAILY AS NEEDED FOR PAIN, Disp: , Rfl:   •  sacubitril-valsartan (Entresto) 24-26 MG tablet, Entresto 24 mg-26 mg tablet  TAKE 1 TABLET BY MOUTH TWICE DAILY, Disp: , Rfl:   •  sertraline (ZOLOFT) 50 MG tablet, , Disp: , Rfl:   •  sevelamer (RENVELA) 800 MG tablet, , Disp: , Rfl:     Current Facility-Administered Medications:   •  lidocaine-prilocaine (EMLA) 2.5-2.5 % cream, , Topical, PRN, Alyssa Rutherford, APRN    Review of Systems   Constitutional: Positive for malaise/fatigue and weight loss.   Cardiovascular: Positive for dyspnea on exertion. Negative for claudication.        Fistula bleeding (N). Fistula Pain (N). Extremity Pain (N). Extremity Discoloration (N)     Respiratory: Positive for shortness of breath.    Hematologic/Lymphatic: Negative for bleeding problem.   Skin: Positive for color change, poor wound healing and unusual hair distribution.   Musculoskeletal: Positive for arthritis and back pain.   Gastrointestinal: Positive for nausea and vomiting.   Neurological:  Negative for numbness and paresthesias.   Psychiatric/Behavioral: Negative for altered mental status.   All other systems reviewed and are negative.       Objective       Vitals:    03/31/23 1448   BP: (!) 82/60   Pulse: 58   SpO2: 97%      Body mass index is 28.03 kg/m².  Physical Exam   Constitutional: He is oriented to person, place, and time. He appears ill.   HENT:   Head: Atraumatic.   Cardiovascular: Normal rate.   Pulses:       Radial pulses are 2+ on the left side.   Fistula Present LEFT Extremity: No thrill or bruit     Pulmonary/Chest: Effort normal.   Abdominal: Soft.   Musculoskeletal: Normal range of motion.      Right lower leg: Edema present.      Left lower leg: Edema present.      Comments: Gait normal   Neurological: He is alert and oriented to person, place, and time. No cranial nerve deficit.   Skin: Skin is warm and dry. Capillary refill takes 2 to 3 seconds.   No venous staining  Bilateral toes with gangrene, erythema   Psychiatric: Thought content normal.   Vitals reviewed.        Assessment & Plan     Independent Review of Radiographic Studies:      1. A-V fistula  Patient reports his fistula has been occluded for 3 months, did not contact office when this occurred.    He is not currently on hemodialysis, contact office should HD be required in the future and consideration for new access.    2. ESRF (end stage renal failure)  Dialysis via peritoneal.    3. PVD (peripheral vascular disease) with claudication  Today's evaluation is limited due to patient's leg spasms, nausea and vomiting.    However we were able to to obtain good triphasic distal signals bilaterally.  I feel that his distal perfusion is adequate to promote tissue healing.  Wounds appear to be the result of injury, and likely complicated by hypoperfusion secondary to his persistent hypotension and chronic heart failure with reduced EF.    These findings were communicated to his wound care provider Shoshana ROMERO.    He remains  on Plavix    4. Bilateral diabetic foot ulcer associated with secondary diabetes mellitus  Following up in WC on Monday  Lab Results   Component Value Date    HGBA1C 10.02 (H) 03/24/2017         5. Acute on chronic systolic heart failure  He is currently on Entresto, he is also ESRD on peritoneal dialysis.  He is quite hypotensive today in office systolic in the 70s on JERAMIE, he refused emergency department evaluation.  On midodrine.    I strongly recommended to patient that he contact his nephrologist as well as his cardiologist to discuss his use of Entresto further given his persistent hypotension.                  This document has been electronically signed by NICK Fisher on April 3, 2023 09:04 CDT

## 2023-04-26 ENCOUNTER — TRANSCRIBE ORDERS (OUTPATIENT)
Dept: PODIATRY | Facility: CLINIC | Age: 58
End: 2023-04-26
Payer: MEDICARE

## 2023-04-26 DIAGNOSIS — L60.5 YELLOW NAIL SYNDROME: Primary | ICD-10-CM

## 2024-12-12 NOTE — PATIENT INSTRUCTIONS
CT scan of the head and cervical spine did not show evidence of intracranial bleeding or other fractures.  The x-rays of your wrist show a small chip fracture on one of the wrist bones (triquetrum).  Based upon your symptoms you also likely have a mild concussion.     The anterior lip laceration will heal on its own over the next week.  Wear the wrist splint as directed for the next week until you follow-up with orthopedics for reevaluation.  You can use over-the-counter Tylenol ibuprofen as needed for any further pain.  Use the prescribed Zofran as needed for any further episodes of nausea or vomiting.    Follow-up with your primary care provider for routine reevaluation.  Return back to ED sooner for any worsening dizziness, nausea, pain, or any other new or concerning symptoms.   Stable Post Op LEFT fistula placement  Healing well  +thrill (check daily)  Signs and symptoms of infection including drainage from operative site, redness, swelling, with associated fever and/or chills notify [facility] and Vascular center immediately for wound check.  Clean operative site with antibacterial soap/water, pat dry. Keep open to air unless draining, then may apply dry dressing.  No ointments or creams unless prescribed by provider.   Return 6 weeks- Duplex

## (undated) DEVICE — DRAPE,U/ SHT,SPLIT,PLAS,STERIL: Brand: MEDLINE

## (undated) DEVICE — STERILE POLYISOPRENE POWDER-FREE SURGICAL GLOVES WITH EMOLLIENT COATING: Brand: PROTEXIS

## (undated) DEVICE — PK CATH LAB 60

## (undated) DEVICE — FOGARTY - HYDRAGRIP SURGICAL - CLAMP INSERTS: Brand: FOGARTY SOFTJAW

## (undated) DEVICE — GLV SURG TRIUMPH LT PF LTX 6 STRL

## (undated) DEVICE — PK ANGIO LF 60

## (undated) DEVICE — PK PM 60

## (undated) DEVICE — CATH DIAG EXPO M/ PK 6FR FL4/FR4 PIG 3PK

## (undated) DEVICE — ANTIBACTERIAL UNDYED BRAIDED (POLYGLACTIN 910), SYNTHETIC ABSORBABLE SUTURE: Brand: COATED VICRYL

## (undated) DEVICE — CLMP VASC VASC-STATT2 PLS GENTL JAW MIDI STR 75-80PSI YEL

## (undated) DEVICE — SUT VICRYL 4/0 SUTUPAK 18 J109T

## (undated) DEVICE — SUT PDS 3/0 SH 27IN DYED Z316H

## (undated) DEVICE — MODEL BT2000 P/N 700287-012KIT CONTENTS: MANIFOLD WITH SALINE AND CONTRAST PORTS, SALINE TUBING WITH SPIKE AND HAND SYRINGE, TRANSDUCER: Brand: BT2000 AUTOMATED MANIFOLD KIT

## (undated) DEVICE — 3M™ IOBAN™ 2 ANTIMICROBIAL INCISE DRAPE 6651EZ: Brand: IOBAN™ 2

## (undated) DEVICE — DRSNG SURESITE WNDW 4X4.5

## (undated) DEVICE — MONOPOLAR METZENBAUM SCISSOR TIP, DISPOSABLE: Brand: MONOPOLAR METZENBAUM SCISSOR TIP, DISPOSABLE

## (undated) DEVICE — SUT PROLENE 7-0 BV175-7 24IN DB ETH8766H

## (undated) DEVICE — KT INTRO MINISTICK MAX W/GW NITNL/TUNG ECHO 4F 21G 7CM

## (undated) DEVICE — DRSNG TELFA PAD NONADH STR 1S 3X8IN

## (undated) DEVICE — GLV SURG SENSICARE GREEN W/ALOE PF LF 6 STRL

## (undated) DEVICE — CORE TRUMPET FOR SINGLE SOLUTION BAG: Brand: CORE DYNAMICS

## (undated) DEVICE — SOL IRRIG NACL 1000ML

## (undated) DEVICE — ANGIO-SEAL EVOLUTION VASCULAR CLOSURE DEVICE: Brand: ANGIO-SEAL

## (undated) DEVICE — GLV SURG SENSICARE POLYISPRN W/ALOE PF LF 6 GRN STRL

## (undated) DEVICE — CATH IV INTROCAN SFTY FEP 18G 1.25IN

## (undated) DEVICE — DRAPE,EXTREMITY,89X128,STERILE: Brand: MEDLINE

## (undated) DEVICE — GLV SURG TRIUMPH LT PF LTX 7.5 STRL

## (undated) DEVICE — SHEET, T, LAPAROTOMY, STERILE: Brand: MEDLINE

## (undated) DEVICE — PETROLATUM GAUZE STRIP: Brand: VASELINE

## (undated) DEVICE — CYSTO/BLADDER IRRIGATION SET, REGULATING CLAMP

## (undated) DEVICE — TOWEL,OR,DSP,ST,NATURAL,DLX,4/PK,20PK/CS: Brand: MEDLINE

## (undated) DEVICE — GOWN,AURORA,NOREINF,RAGLAN,XL,STERILE: Brand: MEDLINE

## (undated) DEVICE — SUT MONOCRYL 4/0 PS2 27IN Y426H ETY426H

## (undated) DEVICE — ENDOPATH XCEL BLADELESS TROCARS WITH STABILITY SLEEVES: Brand: ENDOPATH XCEL

## (undated) DEVICE — VISUALIZATION SYSTEM: Brand: CLEARIFY

## (undated) DEVICE — BIOPATCH™ ANTIMICROBIAL DRESSING WITH CHLORHEXIDINE GLUCONATE IS A HYDROPHILLIC POLYURETHANE ABSORPTIVE FOAM WITH CHLORHEXIDINE GLUCONATE (CHG) WHICH INHIBITS BACTERIAL GROWTH UNDER THE DRESSING. THE DRESSING IS INTENDED TO BE USED TO ABSORB EXUDATE, COVER A WOUND CAUSED BY VASCULAR AND NONVASCULAR PERCUTANEOUS MEDICAL DEVICES DURING SURGERY, AS WELL AS REDUCE LOCAL INFECTION AND COLONIZATION OF MICROORGANISMS.: Brand: BIOPATCH

## (undated) DEVICE — KT INTRO MINISTICK MAX W/GW PALLADIUM ECHO 4F 21G 7CM

## (undated) DEVICE — ELECTRODE,RT,STRESS,FOAM,50PK: Brand: MEDLINE

## (undated) DEVICE — NDL HYPO ECLPS SFTY 25G 1 1/2IN

## (undated) DEVICE — GLV SURG SENSICARE PI LF PF 8 GRN STRL

## (undated) DEVICE — SOL IRR NACL 0.9PCT BT 1000ML

## (undated) DEVICE — PART NUMBER 108260, VTI 8 MHZ DISPOSABLE DOPPLER PROBE, STRAIGHT, BOX: Brand: VTI 8 MHZ DISPOSABLE DOPPLER PROBE, STRAIGHT, BOX

## (undated) DEVICE — MAD PERIPHERAL VASCULAR-LF: Brand: MEDLINE INDUSTRIES, INC.

## (undated) DEVICE — 3M™ STERI-STRIP™ REINFORCED ADHESIVE SKIN CLOSURES, R1547, 1/2 IN X 4 IN (12 MM X 100 MM), 6 STRIPS/ENVELOPE: Brand: 3M™ STERI-STRIP™

## (undated) DEVICE — GLV SURG TRIUMPH LT PF LTX 7 STRL

## (undated) DEVICE — NDL HYPO SFTY GLD 22G 1 1/2IN

## (undated) DEVICE — ELECTRD BLD EZ CLN MOD 2.5IN

## (undated) DEVICE — SUT ETHLN 2/0 FS 18IN 664H

## (undated) DEVICE — SKIN AFFIX SURG ADHESIVE 72/CS 0.55ML: Brand: MEDLINE

## (undated) DEVICE — INTRO SHEATH ART/FEM ENGAGE .038 6F12CM

## (undated) DEVICE — SUT VIC 3/0 TIES 18IN J110T

## (undated) DEVICE — PK LAP CHOLE LF 60

## (undated) DEVICE — GLIDEPATH HEMODIALYSIS CATH, ST, DL, 14.5 FR. 23CM: Brand: GLIDEPATH LONG-TERM HEMODIALYSIS CATHETER WITH PRELOADED STYLET

## (undated) DEVICE — SOL NACL 0.9PCT 1000ML

## (undated) DEVICE — SYS SKIN CLS DERMABOND PRINEO W/22CM MESH TP

## (undated) DEVICE — GLV SURG SENSICARE PI PF LF 7 GRN STRL

## (undated) DEVICE — SUT PROLN CARDIO BV1 6/0 24IN 8805H

## (undated) DEVICE — GW PERIPH GUIDERIGHT STD/J/TP PTFE/PCOAT SS 0.038IN 5X150CM

## (undated) DEVICE — MODEL AT P65, P/N 701554-001KIT CONTENTS: HAND CONTROLLER, 3-WAY HIGH-PRESSURE STOPCOCK WITH ROTATING END AND PREMIUM HIGH-PRESSURE TUBING: Brand: ANGIOTOUCH® KIT

## (undated) DEVICE — I-KNIFE CUTTING INSTRUMENT 15 DEGREE: Brand: I-KNIFE

## (undated) DEVICE — ADHS LIQ MASTISOL 2/3ML

## (undated) DEVICE — SUT SILK B SUTUPK 2/0 18IN SA65H